# Patient Record
Sex: FEMALE | Race: BLACK OR AFRICAN AMERICAN | NOT HISPANIC OR LATINO | Employment: OTHER | URBAN - NONMETROPOLITAN AREA
[De-identification: names, ages, dates, MRNs, and addresses within clinical notes are randomized per-mention and may not be internally consistent; named-entity substitution may affect disease eponyms.]

---

## 2017-01-02 DIAGNOSIS — J30.2 SEASONAL ALLERGIES: ICD-10-CM

## 2017-01-02 RX ORDER — MONTELUKAST SODIUM 10 MG/1
TABLET ORAL
Qty: 90 TABLET | Refills: 3 | Status: SHIPPED | OUTPATIENT
Start: 2017-01-02 | End: 2017-10-13 | Stop reason: SDUPTHER

## 2017-01-12 ENCOUNTER — OFFICE VISIT (OUTPATIENT)
Dept: OTOLARYNGOLOGY | Facility: CLINIC | Age: 67
End: 2017-01-12

## 2017-01-12 VITALS
RESPIRATION RATE: 16 BRPM | TEMPERATURE: 97.7 F | WEIGHT: 182 LBS | HEART RATE: 108 BPM | DIASTOLIC BLOOD PRESSURE: 87 MMHG | SYSTOLIC BLOOD PRESSURE: 135 MMHG | HEIGHT: 63 IN | BODY MASS INDEX: 32.25 KG/M2

## 2017-01-12 DIAGNOSIS — J30.1 ALLERGIC RHINITIS DUE TO POLLEN, UNSPECIFIED RHINITIS SEASONALITY: ICD-10-CM

## 2017-01-12 DIAGNOSIS — G47.33 OSA (OBSTRUCTIVE SLEEP APNEA): ICD-10-CM

## 2017-01-12 DIAGNOSIS — J30.0 VASOMOTOR RHINITIS: ICD-10-CM

## 2017-01-12 DIAGNOSIS — J32.9 CHRONIC SINUSITIS, UNSPECIFIED LOCATION: Primary | ICD-10-CM

## 2017-01-12 DIAGNOSIS — J34.89 CONCHA BULLOSA: ICD-10-CM

## 2017-01-12 PROCEDURE — 99213 OFFICE O/P EST LOW 20 MIN: CPT | Performed by: NURSE PRACTITIONER

## 2017-01-12 RX ORDER — IPRATROPIUM BROMIDE 42 UG/1
2 SPRAY, METERED NASAL 2 TIMES DAILY PRN
Qty: 15 ML | Refills: 11 | Status: SHIPPED | OUTPATIENT
Start: 2017-01-12 | End: 2017-07-12 | Stop reason: SDUPTHER

## 2017-01-12 NOTE — PATIENT INSTRUCTIONS
Will add atrovent as needed or she can substitute this for the Patanase if it works better.  Call for problems or worsening symptoms

## 2017-01-12 NOTE — MR AVS SNAPSHOT
Camila Wallace   1/12/2017 1:00 PM   Office Visit    Dept Phone:  [phone]   Encounter #:  [id number]    Provider:  TONEY Rodriguez   Department:  [facility]                Your Full Care Plan              Today's Medication Changes          These changes are accurate as of: 1/12/17  1:29 PM.  If you have any questions, ask your nurse or doctor.               New Medication(s)Ordered:     ipratropium 0.06 % nasal spray   Commonly known as:  ATROVENT   2 sprays into each nostril 2 (Two) Times a Day As Needed for rhinitis.   Started by:  TONEY Rodriguez            Where to Get Your Medications      These medications were sent to [facility] - [place], [place] - [address] - [phone]  - [phone]   [address], [facility] [address]     Phone:  [phone]     ipratropium 0.06 % nasal spray                  Your Updated Medication List          This list is accurate as of: 1/12/17  1:29 PM.  Always use your most recent med list.                atorvastatin 40 MG tablet   Commonly known as:  LIPITOR       azelastine 0.1 % nasal spray   Commonly known as:  ASTELIN   2 sprays into each nostril 2 (Two) Times a Day for 90 days. Use in each nostril as directed       fluticasone 50 MCG/ACT nasal spray   Commonly known as:  FLONASE       glimepiride 1 MG tablet   Commonly known as:  AMARYL       ipratropium 0.06 % nasal spray   Commonly known as:  ATROVENT   2 sprays into each nostril 2 (Two) Times a Day As Needed for rhinitis.       levothyroxine sodium 100 MCG capsule   Commonly known as:  TIROSINT       montelukast 10 MG tablet   Commonly known as:  SINGULAIR       NORCO  MG per tablet   Generic drug:  HYDROcodone-acetaminophen       PENNSAID 2 % solution   Generic drug:  Diclofenac Sodium       PRINZIDE 20-12.5 MG per tablet   Generic drug:  lisinopril-hydrochlorothiazide       PROTONIX 40 MG EC tablet   Generic drug:  pantoprazole       "topiramate 100 MG tablet   Commonly known as:  TOPAMAX       vitamin D 81412 UNITS capsule capsule   Commonly known as:  ERGOCALCIFEROL               Instructions     None    Patient Instructions History      Upcoming Appointments     Visit Type Date Time Department    FOLLOW UP 2017  1:00 PM [facility] [place]    FOLLOW UP 2017  1:00 PM [facility] [place]      MyChart Signup     Our records indicate that you have declined [facility] sign[facility]. If you would like to sign up for [facility], please email [email or web address] or call [phone] to obtain an activation code.             Other Info from Your Visit           Your Appointments     2017  1:00 PM CDT   Follow Up with TONEY Rodriguez   [facility] (--)    [address] [place] [address] [address] [id number] [phone]           Arrive 15 minutes prior to appointment.              Allergies     Metformin And Related        Reason for Visit     Follow-up sinus problems; sinus drainage      Vital Signs     Blood Pressure Pulse Temperature Respirations Height Weight    135/87 108 97.7 °F (36.5 °C) 16 63\" (160 cm) 182 lb (82.6 kg)    Body Mass Index Smoking Status                32.24 kg/m2 Never Smoker            "

## 2017-01-12 NOTE — PROGRESS NOTES
YOB: 1950  Location: [facility]  Location Address: [address] [place] [address], [facility], [address] [place] [address]  Location Phone: [phone]    Chief Complaint   Patient presents with   • Follow-up     sinus problems; sinus drainage       History of Present Illness  Camila Wallace is a 66 y.o. female.  Camila Wallace is here for a followup evaluation of ENT complaints. The patient has had problems with nasal congestion, repeated sinusitis and allergy  The symptoms are localized to the bilateral nose. The patient has had reduction in symptoms however still has some runny nose when she goes outside and other cold weather changes. The symptoms have been present for the last several years . The symptoms are aggravated by  allergy and weather change .  She denies obstructive symptoms.  She is wearing her CPAP without difficulty.  She is not interested in surgery.     Past Medical History   Diagnosis Date   • Allergic rhinitis    • GERD (gastroesophageal reflux disease)    • HLD (hyperlipidemia)    • HTN (hypertension)    • Type 2 diabetes mellitus        Past Surgical History   Procedure Laterality Date   • Cholecystectomy     • Tympanic membrane repair     • Tubal abdominal ligation     • Foot surgery           Current Outpatient Prescriptions:   •  atorvastatin (LIPITOR) 40 MG tablet, Take 40 mg by mouth Daily., Disp: , Rfl:   •  azelastine (ASTELIN) 0.1 % nasal spray, 2 sprays into each nostril 2 (Two) Times a Day for 90 days. Use in each nostril as directed, Disp: 3 each, Rfl: 3  •  Diclofenac Sodium (PENNSAID) 2 % solution, Apply 2 Squirts topically 2 (Two) Times a Day., Disp: , Rfl:   •  fluticasone (FLONASE) 50 MCG/ACT nasal spray, 2 sprays into each nostril Daily., Disp: , Rfl:   •  glimepiride (AMARYL) 1 MG tablet, Take 1 mg by mouth Daily., Disp: , Rfl:   •  HYDROcodone-acetaminophen (NORCO)  MG per tablet, Take 1 tablet by mouth 3 (Three) Times a Day As Needed., Disp: , Rfl:   •  levothyroxine  sodium (TIROSINT) 100 MCG capsule, Take 100 mcg by mouth Daily., Disp: , Rfl:   •  lisinopril-hydrochlorothiazide (PRINZIDE) 20-12.5 MG per tablet, Take 1 tablet by mouth Daily., Disp: , Rfl:   •  montelukast (SINGULAIR) 10 MG tablet, Take 10 mg by mouth Daily., Disp: , Rfl:   •  pantoprazole (PROTONIX) 40 MG EC tablet, Take 40 mg by mouth Daily., Disp: , Rfl:   •  topiramate (TOPAMAX) 100 MG tablet, Take 100 mg by mouth 2 (Two) Times a Day., Disp: , Rfl:   •  vitamin D (ERGOCALCIFEROL) 31936 UNITS capsule capsule, Take 50,000 Units by mouth 1 (One) Time Per Week., Disp: , Rfl:   •  ipratropium (ATROVENT) 0.06 % nasal spray, 2 sprays into each nostril 2 (Two) Times a Day As Needed for rhinitis., Disp: 15 mL, Rfl: 11    Metformin and related    Family History   Problem Relation Age of Onset   • Diabetes Mother    • Heart disease Mother    • Hypertension Mother    • Hypertension Father    • Diabetes Brother    • Hypertension Brother    • Hyperlipidemia Brother    • Hyperlipidemia Maternal Uncle    • Thyroid disease Maternal Uncle    • Cancer Maternal Grandmother        Social History     Social History   • Marital status:      Spouse name: N/A   • Number of children: N/A   • Years of education: N/A     Occupational History   • Not on file.     Social History Main Topics   • Smoking status: Never Smoker   • Smokeless tobacco: Never Used   • Alcohol use Yes      Comment: occasionally   • Drug use: Defer   • Sexual activity: Not on file     Other Topics Concern   • Not on file     Social History Narrative       Review of Systems   Constitutional: Negative.    HENT:        SEE HPI   Eyes: Negative.    Respiratory: Negative.    Cardiovascular: Negative.    Gastrointestinal: Negative.    Endocrine: Negative.    Genitourinary: Negative.    Musculoskeletal: Negative.    Skin: Negative.    Allergic/Immunologic: Negative.    Neurological: Negative.    Hematological: Negative.    Psychiatric/Behavioral: Negative.         Vitals:    01/12/17 1305   BP: 135/87   Pulse: 108   Resp: 16   Temp: 97.7 °F (36.5 °C)       Objective     Physical Exam    CONSTITUTIONAL: well nourished, alert, oriented, in no acute distress     COMMUNICATION AND VOICE: able to communicate normally, normal voice quality    HEAD: normocephalic, no lesions, atraumatic, no tenderness, no masses     FACE: appearance normal, no lesions, no tenderness, no deformities, facial motion symmetric    SALIVARY GLANDS: parotid glands with no tenderness, no swelling, no masses, submandibular glands with normal size, nontender    EYES: ocular motility normal, eyelids normal, orbits normal, no proptosis, conjunctiva normal , pupils equal, round     EARS:  Hearing: response to conversational voice normal bilaterally   External Ears: auricles without lesions  Otoscopic: tympanic membrane appearance normal, no lesions, no perforation, normal mobility, no fluid    NOSE:  External Nose: structure normal, no tenderness on palpation, no nasal discharge, no lesions, no evidence of trauma, nostrils patent   Intranasal Exam: nasal mucosa normal, vestibule within normal limits, inferior turbinate hypertrophy, nasal septum midline     ORAL:  Lips: upper and lower lips without lesion   Teeth: dentition within normal limits for age   Gums: gingivae healthy   Oral Mucosa: oral mucosa normal, no mucosal lesions   Floor of Mouth: Warthin’s duct patent, mucosa normal  Tongue: lingual mucosa normal without lesions, normal tongue mobility   Palate: soft and hard palates with normal mucosa and structure  Oropharynx: oropharyngeal mucosa normal    NECK: neck appearance normal, no mass,  noted without erythema or tenderness    THYROID: no overt thyromegaly, no tenderness, nodules or mass present on palpation, position midline     LYMPH NODES: no lymphadenopathy    CHEST/RESPIRATORY: respiratory effort normal     CARDIOVASCULAR: extremities without cyanosis or edema      NEUROLOGIC/PSYCHIATRIC:  oriented to time, place and person, mood normal, affect appropriate, CN II-XII intact grossly    Assessment/Plan   Camila was seen today for follow-up.    Diagnoses and all orders for this visit:    Chronic sinusitis, unspecified location    Sommer bullosa    Allergic rhinitis due to pollen, unspecified rhinitis seasonality    FAUSTO (obstructive sleep apnea)    Vasomotor rhinitis    Other orders  -     ipratropium (ATROVENT) 0.06 % nasal spray; 2 sprays into each nostril 2 (Two) Times a Day As Needed for rhinitis.      * Surgery not found *  No orders of the defined types were placed in this encounter.    Return in about 8 months (around 9/12/2017).       Patient Instructions   Will add atrovent as needed or she can substitute this for the Patanase if it works better.  Call for problems or worsening symptoms

## 2017-01-19 RX ORDER — HYDROCODONE BITARTRATE AND ACETAMINOPHEN 10; 325 MG/1; MG/1
1 TABLET ORAL 3 TIMES DAILY PRN
Qty: 90 TABLET | Refills: 0 | Status: SHIPPED | OUTPATIENT
Start: 2017-01-20 | End: 2017-02-17 | Stop reason: SDUPTHER

## 2017-02-17 ENCOUNTER — HOSPITAL ENCOUNTER (OUTPATIENT)
Dept: PAIN MANAGEMENT | Age: 67
Discharge: HOME | End: 2017-02-17
Payer: MEDICARE

## 2017-02-17 VITALS
HEIGHT: 62 IN | SYSTOLIC BLOOD PRESSURE: 138 MMHG | WEIGHT: 188 LBS | RESPIRATION RATE: 18 BRPM | OXYGEN SATURATION: 100 % | TEMPERATURE: 95.5 F | BODY MASS INDEX: 34.6 KG/M2 | DIASTOLIC BLOOD PRESSURE: 89 MMHG | HEART RATE: 105 BPM

## 2017-02-17 DIAGNOSIS — M25.561 PAIN IN BOTH KNEES, UNSPECIFIED CHRONICITY: ICD-10-CM

## 2017-02-17 DIAGNOSIS — M25.562 PAIN IN BOTH KNEES, UNSPECIFIED CHRONICITY: ICD-10-CM

## 2017-02-17 DIAGNOSIS — M17.0 ARTHRITIS OF BOTH KNEES: ICD-10-CM

## 2017-02-17 PROCEDURE — 2500000003 HC RX 250 WO HCPCS

## 2017-02-17 PROCEDURE — 6360000002 HC RX W HCPCS

## 2017-02-17 PROCEDURE — 80307 DRUG TEST PRSMV CHEM ANLYZR: CPT

## 2017-02-17 PROCEDURE — 20611 DRAIN/INJ JOINT/BURSA W/US: CPT

## 2017-02-17 RX ORDER — HYDROCODONE BITARTRATE AND ACETAMINOPHEN 10; 325 MG/1; MG/1
1 TABLET ORAL 3 TIMES DAILY PRN
Qty: 90 TABLET | Refills: 0 | Status: SHIPPED | OUTPATIENT
Start: 2017-02-17 | End: 2017-03-10 | Stop reason: SDUPTHER

## 2017-02-17 ASSESSMENT — PAIN - FUNCTIONAL ASSESSMENT: PAIN_FUNCTIONAL_ASSESSMENT: 0-10

## 2017-02-17 ASSESSMENT — PAIN DESCRIPTION - DESCRIPTORS: DESCRIPTORS: ACHING;THROBBING

## 2017-02-17 ASSESSMENT — ACTIVITIES OF DAILY LIVING (ADL): EFFECT OF PAIN ON DAILY ACTIVITIES: DAILY ACTIVITY

## 2017-02-22 DIAGNOSIS — I10 ESSENTIAL HYPERTENSION: ICD-10-CM

## 2017-02-23 RX ORDER — LISINOPRIL AND HYDROCHLOROTHIAZIDE 20; 12.5 MG/1; MG/1
TABLET ORAL
Qty: 90 TABLET | Refills: 2 | Status: SHIPPED | OUTPATIENT
Start: 2017-02-23 | End: 2017-12-01 | Stop reason: SDUPTHER

## 2017-02-23 RX ORDER — LEVOTHYROXINE SODIUM 0.1 MG/1
TABLET ORAL
Qty: 90 TABLET | Refills: 2 | Status: SHIPPED | OUTPATIENT
Start: 2017-02-23 | End: 2017-12-01 | Stop reason: SDUPTHER

## 2017-02-23 RX ORDER — ATORVASTATIN CALCIUM 40 MG/1
TABLET, FILM COATED ORAL
Qty: 90 TABLET | Refills: 3 | Status: SHIPPED | OUTPATIENT
Start: 2017-02-23 | End: 2018-07-13 | Stop reason: SDUPTHER

## 2017-03-03 ENCOUNTER — HOSPITAL ENCOUNTER (OUTPATIENT)
Dept: PAIN MANAGEMENT | Age: 67
Discharge: HOME | End: 2017-03-03
Payer: MEDICARE

## 2017-03-03 VITALS
TEMPERATURE: 97.2 F | BODY MASS INDEX: 34.6 KG/M2 | RESPIRATION RATE: 20 BRPM | DIASTOLIC BLOOD PRESSURE: 80 MMHG | HEIGHT: 62 IN | SYSTOLIC BLOOD PRESSURE: 122 MMHG | WEIGHT: 188 LBS | HEART RATE: 95 BPM | OXYGEN SATURATION: 99 %

## 2017-03-03 DIAGNOSIS — M25.562 PAIN IN BOTH KNEES, UNSPECIFIED CHRONICITY: ICD-10-CM

## 2017-03-03 DIAGNOSIS — M17.0 ARTHRITIS OF BOTH KNEES: ICD-10-CM

## 2017-03-03 DIAGNOSIS — M25.561 PAIN IN BOTH KNEES, UNSPECIFIED CHRONICITY: ICD-10-CM

## 2017-03-03 PROCEDURE — 6360000002 HC RX W HCPCS

## 2017-03-03 PROCEDURE — 20611 DRAIN/INJ JOINT/BURSA W/US: CPT

## 2017-03-03 PROCEDURE — 2500000003 HC RX 250 WO HCPCS

## 2017-03-03 ASSESSMENT — PAIN - FUNCTIONAL ASSESSMENT: PAIN_FUNCTIONAL_ASSESSMENT: 0-10

## 2017-03-03 ASSESSMENT — ACTIVITIES OF DAILY LIVING (ADL): EFFECT OF PAIN ON DAILY ACTIVITIES: DAILY ACTIVITY

## 2017-03-03 ASSESSMENT — PAIN DESCRIPTION - DESCRIPTORS: DESCRIPTORS: ACHING;THROBBING

## 2017-03-08 LAB
AMPHETAMINES, URINE: NEGATIVE NG/ML
BARBITURATES, URINE: NEGATIVE NG/ML
BENZODIAZEPINES, URINE: NEGATIVE NG/ML
CANNABINOIDS, URINE: NEGATIVE NG/ML
COCAINE METABOLITE, URINE: NEGATIVE NG/ML
CODEINE, URINE: NEGATIVE
CREATININE, URINE: 65.3 MG/DL (ref 20–300)
ETHANOL U, QUAN: NEGATIVE %
FENTANYL URINE: NEGATIVE ML
HYDROCODONE, UR CONF: 336 NG/ML
HYDROCODONE, URINE: POSITIVE
HYDROMORPHONE, URINE: NEGATIVE
MEPERIDINE, UR: NEGATIVE NG/ML
METHADONE SCREEN, URINE: NEGATIVE NG/ML
MORPHINE URINE: NEGATIVE
OPIATES, URINE: ABNORMAL NG/ML
OPIATES, URINE: POSITIVE NG/ML
OXYCODONE/OXYMORPHONE, UR: NEGATIVE NG/ML
PH, URINE: 5.4 (ref 4.5–8.9)
PHENCYCLIDINE, URINE: NEGATIVE NG/ML
PROPOXYPHENE, URINE: NEGATIVE NG/ML

## 2017-03-10 ENCOUNTER — HOSPITAL ENCOUNTER (OUTPATIENT)
Dept: PAIN MANAGEMENT | Age: 67
Discharge: HOME | End: 2017-03-10
Payer: MEDICARE

## 2017-03-10 ENCOUNTER — HOSPITAL ENCOUNTER (OUTPATIENT)
Dept: PAIN MANAGEMENT | Age: 67
Discharge: HOME | End: 2017-03-10

## 2017-03-10 VITALS
HEART RATE: 90 BPM | BODY MASS INDEX: 32.25 KG/M2 | DIASTOLIC BLOOD PRESSURE: 77 MMHG | OXYGEN SATURATION: 99 % | WEIGHT: 182 LBS | TEMPERATURE: 97 F | SYSTOLIC BLOOD PRESSURE: 120 MMHG | RESPIRATION RATE: 20 BRPM | HEIGHT: 63 IN

## 2017-03-10 DIAGNOSIS — M25.562 PAIN IN BOTH KNEES, UNSPECIFIED CHRONICITY: ICD-10-CM

## 2017-03-10 DIAGNOSIS — M25.561 PAIN IN BOTH KNEES, UNSPECIFIED CHRONICITY: ICD-10-CM

## 2017-03-10 DIAGNOSIS — M17.0 ARTHRITIS OF BOTH KNEES: ICD-10-CM

## 2017-03-10 PROCEDURE — 20611 DRAIN/INJ JOINT/BURSA W/US: CPT

## 2017-03-10 PROCEDURE — 6360000002 HC RX W HCPCS

## 2017-03-10 PROCEDURE — 2500000003 HC RX 250 WO HCPCS

## 2017-03-10 RX ORDER — LIDOCAINE HYDROCHLORIDE 10 MG/ML
INJECTION, SOLUTION EPIDURAL; INFILTRATION; INTRACAUDAL; PERINEURAL
Status: COMPLETED | OUTPATIENT
Start: 2017-03-10 | End: 2017-03-10

## 2017-03-10 RX ADMIN — LIDOCAINE HYDROCHLORIDE 1 ML: 10 INJECTION, SOLUTION EPIDURAL; INFILTRATION; INTRACAUDAL; PERINEURAL at 10:24

## 2017-03-10 ASSESSMENT — PAIN DESCRIPTION - DESCRIPTORS: DESCRIPTORS: ACHING;THROBBING

## 2017-03-10 ASSESSMENT — PAIN - FUNCTIONAL ASSESSMENT: PAIN_FUNCTIONAL_ASSESSMENT: 0-10

## 2017-03-10 ASSESSMENT — ACTIVITIES OF DAILY LIVING (ADL): EFFECT OF PAIN ON DAILY ACTIVITIES: DAILY ACTIVITY

## 2017-03-20 ENCOUNTER — HOSPITAL ENCOUNTER (EMERGENCY)
Age: 67
Discharge: LEFT BEFORE DISCHARGE | End: 2017-03-20
Payer: MEDICARE

## 2017-03-20 VITALS
RESPIRATION RATE: 18 BRPM | TEMPERATURE: 97.6 F | OXYGEN SATURATION: 100 % | WEIGHT: 180 LBS | BODY MASS INDEX: 31.89 KG/M2 | HEIGHT: 63 IN | SYSTOLIC BLOOD PRESSURE: 137 MMHG | HEART RATE: 102 BPM | DIASTOLIC BLOOD PRESSURE: 93 MMHG

## 2017-03-20 PROCEDURE — 4500000002 HC ER NO CHARGE

## 2017-03-20 ASSESSMENT — PAIN DESCRIPTION - LOCATION: LOCATION: WRIST

## 2017-03-20 ASSESSMENT — PAIN SCALES - GENERAL: PAINLEVEL_OUTOF10: 5

## 2017-03-20 ASSESSMENT — PAIN DESCRIPTION - ORIENTATION: ORIENTATION: LEFT

## 2017-03-22 ENCOUNTER — APPOINTMENT (OUTPATIENT)
Dept: GENERAL RADIOLOGY | Age: 67
End: 2017-03-22
Payer: MEDICARE

## 2017-03-22 ENCOUNTER — HOSPITAL ENCOUNTER (EMERGENCY)
Age: 67
Discharge: HOME | End: 2017-03-22
Attending: EMERGENCY MEDICINE
Payer: MEDICARE

## 2017-03-22 ENCOUNTER — APPOINTMENT (OUTPATIENT)
Dept: CT IMAGING | Age: 67
End: 2017-03-22
Payer: MEDICARE

## 2017-03-22 VITALS
RESPIRATION RATE: 18 BRPM | DIASTOLIC BLOOD PRESSURE: 68 MMHG | TEMPERATURE: 98.4 F | OXYGEN SATURATION: 98 % | WEIGHT: 180 LBS | HEIGHT: 63 IN | SYSTOLIC BLOOD PRESSURE: 102 MMHG | HEART RATE: 84 BPM | BODY MASS INDEX: 31.89 KG/M2

## 2017-03-22 DIAGNOSIS — R09.1 PLEURISY: Primary | ICD-10-CM

## 2017-03-22 LAB
ALBUMIN SERPL-MCNC: 4.1 G/DL (ref 3.5–5.2)
ALP BLD-CCNC: 85 U/L (ref 35–104)
ALT SERPL-CCNC: 21 U/L (ref 5–33)
ANION GAP SERPL CALCULATED.3IONS-SCNC: 12 MMOL/L (ref 7–19)
AST SERPL-CCNC: 30 U/L (ref 5–32)
BASOPHILS ABSOLUTE: 0 K/UL (ref 0–0.2)
BASOPHILS RELATIVE PERCENT: 0.4 % (ref 0–1)
BILIRUB SERPL-MCNC: <0.2 MG/DL (ref 0.2–1.2)
BUN BLDV-MCNC: 25 MG/DL (ref 8–23)
CALCIUM SERPL-MCNC: 9.2 MG/DL (ref 8.8–10.2)
CHLORIDE BLD-SCNC: 101 MMOL/L (ref 98–111)
CO2: 25 MMOL/L (ref 22–29)
CREAT SERPL-MCNC: 1.3 MG/DL (ref 0.5–0.9)
D DIMER: 3.97 NG/ML DDU (ref 0–0.48)
EOSINOPHILS ABSOLUTE: 0.1 K/UL (ref 0–0.6)
EOSINOPHILS RELATIVE PERCENT: 0.7 % (ref 0–5)
GFR NON-AFRICAN AMERICAN: 41
GLOBULIN: 3.3 G/DL
GLUCOSE BLD-MCNC: 95 MG/DL (ref 74–109)
HCT VFR BLD CALC: 34.6 % (ref 37–47)
HEMOGLOBIN: 11.5 G/DL (ref 12–16)
LYMPHOCYTES ABSOLUTE: 2.3 K/UL (ref 1.1–4.5)
LYMPHOCYTES RELATIVE PERCENT: 26.7 % (ref 20–40)
MCH RBC QN AUTO: 28.6 PG (ref 27–31)
MCHC RBC AUTO-ENTMCNC: 33.2 G/DL (ref 33–37)
MCV RBC AUTO: 86.1 FL (ref 81–99)
MONOCYTES ABSOLUTE: 0.7 K/UL (ref 0–0.9)
MONOCYTES RELATIVE PERCENT: 8.4 % (ref 0–10)
NEUTROPHILS ABSOLUTE: 5.4 K/UL (ref 1.5–7.5)
NEUTROPHILS RELATIVE PERCENT: 63.8 % (ref 50–65)
PDW BLD-RTO: 14.6 % (ref 11.5–14.5)
PERFORMED ON: NORMAL
PLATELET # BLD: 320 K/UL (ref 130–400)
PMV BLD AUTO: 10.6 FL (ref 7.4–10.4)
POC TROPONIN I: 0.01 NG/ML (ref 0–0.08)
POTASSIUM SERPL-SCNC: 4.4 MMOL/L (ref 3.5–5)
RBC # BLD: 4.02 M/UL (ref 4.2–5.4)
SODIUM BLD-SCNC: 138 MMOL/L (ref 136–145)
TOTAL PROTEIN: 7.4 G/DL (ref 6.6–8.7)
WBC # BLD: 8.4 K/UL (ref 4.8–10.8)

## 2017-03-22 PROCEDURE — 36415 COLL VENOUS BLD VENIPUNCTURE: CPT

## 2017-03-22 PROCEDURE — 99285 EMERGENCY DEPT VISIT HI MDM: CPT

## 2017-03-22 PROCEDURE — 85379 FIBRIN DEGRADATION QUANT: CPT

## 2017-03-22 PROCEDURE — 6360000004 HC RX CONTRAST MEDICATION: Performed by: EMERGENCY MEDICINE

## 2017-03-22 PROCEDURE — 6370000000 HC RX 637 (ALT 250 FOR IP): Performed by: EMERGENCY MEDICINE

## 2017-03-22 PROCEDURE — 80053 COMPREHEN METABOLIC PANEL: CPT

## 2017-03-22 PROCEDURE — 71275 CT ANGIOGRAPHY CHEST: CPT

## 2017-03-22 PROCEDURE — 99283 EMERGENCY DEPT VISIT LOW MDM: CPT | Performed by: EMERGENCY MEDICINE

## 2017-03-22 PROCEDURE — 85025 COMPLETE CBC W/AUTO DIFF WBC: CPT

## 2017-03-22 PROCEDURE — 71010 XR CHEST PORTABLE: CPT

## 2017-03-22 PROCEDURE — 2580000003 HC RX 258: Performed by: EMERGENCY MEDICINE

## 2017-03-22 PROCEDURE — 93005 ELECTROCARDIOGRAM TRACING: CPT

## 2017-03-22 PROCEDURE — 84484 ASSAY OF TROPONIN QUANT: CPT

## 2017-03-22 RX ORDER — ASPIRIN 81 MG/1
324 TABLET, CHEWABLE ORAL ONCE
Status: COMPLETED | OUTPATIENT
Start: 2017-03-22 | End: 2017-03-22

## 2017-03-22 RX ORDER — HYDROCODONE BITARTRATE AND ACETAMINOPHEN 10; 325 MG/1; MG/1
1 TABLET ORAL 3 TIMES DAILY PRN
Qty: 90 TABLET | Refills: 0 | Status: SHIPPED | OUTPATIENT
Start: 2017-03-22 | End: 2017-03-22 | Stop reason: SDUPTHER

## 2017-03-22 RX ORDER — HYDROCODONE BITARTRATE AND ACETAMINOPHEN 10; 325 MG/1; MG/1
1 TABLET ORAL 3 TIMES DAILY PRN
Qty: 90 TABLET | Refills: 0 | Status: SHIPPED | OUTPATIENT
Start: 2017-03-22 | End: 2017-04-21 | Stop reason: SDUPTHER

## 2017-03-22 RX ORDER — ASPIRIN 81 MG/1
TABLET, CHEWABLE ORAL
Status: DISCONTINUED
Start: 2017-03-22 | End: 2017-03-22 | Stop reason: HOSPADM

## 2017-03-22 RX ORDER — 0.9 % SODIUM CHLORIDE 0.9 %
1000 INTRAVENOUS SOLUTION INTRAVENOUS ONCE
Status: COMPLETED | OUTPATIENT
Start: 2017-03-22 | End: 2017-03-22

## 2017-03-22 RX ADMIN — IOPAMIDOL 90 ML: 755 INJECTION, SOLUTION INTRAVENOUS at 19:18

## 2017-03-22 RX ADMIN — ASPIRIN 81 MG CHEWABLE TABLET 324 MG: 81 TABLET CHEWABLE at 18:57

## 2017-03-22 RX ADMIN — SODIUM CHLORIDE 1000 ML: 9 INJECTION, SOLUTION INTRAVENOUS at 19:20

## 2017-03-22 ASSESSMENT — ENCOUNTER SYMPTOMS
SORE THROAT: 0
SHORTNESS OF BREATH: 0
COUGH: 0
DIARRHEA: 1
WHEEZING: 0
RHINORRHEA: 1
NAUSEA: 0
CHEST TIGHTNESS: 0
SINUS PRESSURE: 0
EYES NEGATIVE: 1
VOMITING: 0

## 2017-03-24 LAB
EKG P AXIS: 41 DEGREES
EKG P-R INTERVAL: 142 MS
EKG Q-T INTERVAL: 348 MS
EKG QRS DURATION: 90 MS
EKG QTC CALCULATION (BAZETT): 383 MS
EKG T AXIS: 14 DEGREES

## 2017-03-29 ENCOUNTER — OFFICE VISIT (OUTPATIENT)
Dept: PRIMARY CARE CLINIC | Age: 67
End: 2017-03-29
Payer: MEDICARE

## 2017-03-29 VITALS
RESPIRATION RATE: 18 BRPM | BODY MASS INDEX: 32.25 KG/M2 | DIASTOLIC BLOOD PRESSURE: 68 MMHG | HEART RATE: 84 BPM | SYSTOLIC BLOOD PRESSURE: 118 MMHG | OXYGEN SATURATION: 97 % | HEIGHT: 63 IN | WEIGHT: 182 LBS

## 2017-03-29 DIAGNOSIS — I10 ESSENTIAL HYPERTENSION: Primary | ICD-10-CM

## 2017-03-29 DIAGNOSIS — E89.0 POSTABLATIVE HYPOTHYROIDISM: ICD-10-CM

## 2017-03-29 DIAGNOSIS — E11.69 TYPE 2 DIABETES MELLITUS WITH OTHER SPECIFIED COMPLICATION, WITHOUT LONG-TERM CURRENT USE OF INSULIN (HCC): ICD-10-CM

## 2017-03-29 PROCEDURE — 99214 OFFICE O/P EST MOD 30 MIN: CPT | Performed by: INTERNAL MEDICINE

## 2017-03-29 PROCEDURE — 3014F SCREEN MAMMO DOC REV: CPT | Performed by: INTERNAL MEDICINE

## 2017-03-29 PROCEDURE — G8482 FLU IMMUNIZE ORDER/ADMIN: HCPCS | Performed by: INTERNAL MEDICINE

## 2017-03-29 PROCEDURE — 1123F ACP DISCUSS/DSCN MKR DOCD: CPT | Performed by: INTERNAL MEDICINE

## 2017-03-29 PROCEDURE — G8427 DOCREV CUR MEDS BY ELIG CLIN: HCPCS | Performed by: INTERNAL MEDICINE

## 2017-03-29 PROCEDURE — 4040F PNEUMOC VAC/ADMIN/RCVD: CPT | Performed by: INTERNAL MEDICINE

## 2017-03-29 PROCEDURE — G8417 CALC BMI ABV UP PARAM F/U: HCPCS | Performed by: INTERNAL MEDICINE

## 2017-03-29 PROCEDURE — 3017F COLORECTAL CA SCREEN DOC REV: CPT | Performed by: INTERNAL MEDICINE

## 2017-03-29 PROCEDURE — 1036F TOBACCO NON-USER: CPT | Performed by: INTERNAL MEDICINE

## 2017-03-29 PROCEDURE — G8399 PT W/DXA RESULTS DOCUMENT: HCPCS | Performed by: INTERNAL MEDICINE

## 2017-03-29 PROCEDURE — 3044F HG A1C LEVEL LT 7.0%: CPT | Performed by: INTERNAL MEDICINE

## 2017-03-29 PROCEDURE — 1090F PRES/ABSN URINE INCON ASSESS: CPT | Performed by: INTERNAL MEDICINE

## 2017-03-29 ASSESSMENT — ENCOUNTER SYMPTOMS
SHORTNESS OF BREATH: 0
NAUSEA: 0
CONSTIPATION: 0
COUGH: 0
VOMITING: 0
BACK PAIN: 0
DIARRHEA: 0

## 2017-04-04 ENCOUNTER — TELEPHONE (OUTPATIENT)
Dept: PRIMARY CARE CLINIC | Age: 67
End: 2017-04-04

## 2017-04-04 DIAGNOSIS — E11.9 TYPE 2 DIABETES MELLITUS WITHOUT COMPLICATION, WITHOUT LONG-TERM CURRENT USE OF INSULIN (HCC): ICD-10-CM

## 2017-04-04 RX ORDER — GLUCOSAMINE HCL/CHONDROITIN SU 500-400 MG
CAPSULE ORAL
Qty: 100 STRIP | Refills: 0 | Status: SHIPPED | OUTPATIENT
Start: 2017-04-04 | End: 2017-04-04 | Stop reason: SDUPTHER

## 2017-04-04 RX ORDER — GLUCOSAMINE HCL/CHONDROITIN SU 500-400 MG
CAPSULE ORAL
Qty: 100 STRIP | Refills: 0 | Status: SHIPPED | OUTPATIENT
Start: 2017-04-04 | End: 2017-08-10 | Stop reason: SDUPTHER

## 2017-04-21 ENCOUNTER — HOSPITAL ENCOUNTER (OUTPATIENT)
Dept: PAIN MANAGEMENT | Age: 67
Discharge: HOME | End: 2017-04-21
Payer: MEDICARE

## 2017-04-21 VITALS
DIASTOLIC BLOOD PRESSURE: 91 MMHG | OXYGEN SATURATION: 100 % | HEIGHT: 63 IN | RESPIRATION RATE: 20 BRPM | TEMPERATURE: 96.3 F | BODY MASS INDEX: 31.89 KG/M2 | HEART RATE: 95 BPM | SYSTOLIC BLOOD PRESSURE: 130 MMHG | WEIGHT: 180 LBS

## 2017-04-21 DIAGNOSIS — M25.562 PAIN IN BOTH KNEES, UNSPECIFIED CHRONICITY: ICD-10-CM

## 2017-04-21 DIAGNOSIS — M17.0 ARTHRITIS OF BOTH KNEES: ICD-10-CM

## 2017-04-21 DIAGNOSIS — M25.561 PAIN IN BOTH KNEES, UNSPECIFIED CHRONICITY: ICD-10-CM

## 2017-04-21 PROCEDURE — 99213 OFFICE O/P EST LOW 20 MIN: CPT

## 2017-04-21 RX ORDER — HYDROCODONE BITARTRATE AND ACETAMINOPHEN 10; 325 MG/1; MG/1
1 TABLET ORAL 3 TIMES DAILY PRN
Qty: 90 TABLET | Refills: 0 | Status: SHIPPED | OUTPATIENT
Start: 2017-04-21 | End: 2017-05-17 | Stop reason: SDUPTHER

## 2017-04-21 ASSESSMENT — PAIN SCALES - GENERAL: PAINLEVEL_OUTOF10: 7

## 2017-04-21 ASSESSMENT — PAIN DESCRIPTION - PAIN TYPE: TYPE: CHRONIC PAIN

## 2017-04-21 ASSESSMENT — PAIN DESCRIPTION - ORIENTATION: ORIENTATION: RIGHT;LEFT;LOWER

## 2017-04-21 ASSESSMENT — ACTIVITIES OF DAILY LIVING (ADL): EFFECT OF PAIN ON DAILY ACTIVITIES: DAILY ACTIVITY

## 2017-04-21 ASSESSMENT — PAIN DESCRIPTION - FREQUENCY: FREQUENCY: INTERMITTENT

## 2017-04-21 ASSESSMENT — PAIN DESCRIPTION - DESCRIPTORS: DESCRIPTORS: ACHING;THROBBING

## 2017-04-21 ASSESSMENT — PAIN DESCRIPTION - PROGRESSION: CLINICAL_PROGRESSION: NOT CHANGED

## 2017-04-21 ASSESSMENT — PAIN DESCRIPTION - LOCATION: LOCATION: BACK;KNEE

## 2017-04-21 ASSESSMENT — PAIN DESCRIPTION - ONSET: ONSET: ON-GOING

## 2017-04-21 ASSESSMENT — PAIN DESCRIPTION - DIRECTION: RADIATING_TOWARDS: DOES NOT RADIATE

## 2017-05-18 RX ORDER — HYDROCODONE BITARTRATE AND ACETAMINOPHEN 10; 325 MG/1; MG/1
1 TABLET ORAL 3 TIMES DAILY PRN
Qty: 90 TABLET | Refills: 0 | Status: SHIPPED | OUTPATIENT
Start: 2017-05-21 | End: 2017-06-16 | Stop reason: SDUPTHER

## 2017-06-19 RX ORDER — HYDROCODONE BITARTRATE AND ACETAMINOPHEN 10; 325 MG/1; MG/1
1 TABLET ORAL 3 TIMES DAILY PRN
Qty: 90 TABLET | Refills: 0 | Status: SHIPPED | OUTPATIENT
Start: 2017-06-20 | End: 2017-07-17 | Stop reason: SDUPTHER

## 2017-07-12 ENCOUNTER — OFFICE VISIT (OUTPATIENT)
Dept: OTOLARYNGOLOGY | Facility: CLINIC | Age: 67
End: 2017-07-12

## 2017-07-12 VITALS
HEIGHT: 63 IN | TEMPERATURE: 97.8 F | WEIGHT: 184.13 LBS | BODY MASS INDEX: 32.62 KG/M2 | HEART RATE: 86 BPM | SYSTOLIC BLOOD PRESSURE: 128 MMHG | DIASTOLIC BLOOD PRESSURE: 87 MMHG

## 2017-07-12 DIAGNOSIS — G47.33 OSA (OBSTRUCTIVE SLEEP APNEA): ICD-10-CM

## 2017-07-12 DIAGNOSIS — J30.1 ALLERGIC RHINITIS DUE TO POLLEN, UNSPECIFIED RHINITIS SEASONALITY: ICD-10-CM

## 2017-07-12 DIAGNOSIS — J34.3 HYPERTROPHY OF INFERIOR NASAL TURBINATE: ICD-10-CM

## 2017-07-12 DIAGNOSIS — J30.0 VASOMOTOR RHINITIS: ICD-10-CM

## 2017-07-12 DIAGNOSIS — R09.82 PND (POST-NASAL DRIP): ICD-10-CM

## 2017-07-12 DIAGNOSIS — J34.89 CONCHA BULLOSA: ICD-10-CM

## 2017-07-12 DIAGNOSIS — J30.9 CHRONIC ALLERGIC RHINITIS: ICD-10-CM

## 2017-07-12 DIAGNOSIS — J32.9 CHRONIC SINUSITIS, UNSPECIFIED LOCATION: Primary | ICD-10-CM

## 2017-07-12 PROCEDURE — 99213 OFFICE O/P EST LOW 20 MIN: CPT | Performed by: NURSE PRACTITIONER

## 2017-07-12 RX ORDER — IPRATROPIUM BROMIDE 42 UG/1
2 SPRAY, METERED NASAL 2 TIMES DAILY PRN
Qty: 15 ML | Refills: 11 | Status: SHIPPED | OUTPATIENT
Start: 2017-07-12 | End: 2018-07-02 | Stop reason: SDUPTHER

## 2017-07-12 NOTE — PROGRESS NOTES
YOB: 1950  Location: [facility]  Location Address: [address] [place] [address], [facility] [address], [address] [place] [address]  Location Phone: [phone]    Chief Complaint   Patient presents with   • Sinus Problem       History of Present Illness  Camila Wallace is a 67 y.o. female.  Camila Wallace is here for a followup evaluation of ENT complaints. The patient has had problems with nasal congestion, repeated sinusitis and allergy  The symptoms are localized to the bilateral nose.  The symptoms have been present for the last several years . The symptoms are aggravated by  allergy and weather change .  She denies obstructive symptoms.  She is wearing her CPAP without difficulty.  She denies any other complaints.     Past Medical History:   Diagnosis Date   • Allergic rhinitis    • Chronic sinusitis    • Sommer bullosa    • GERD (gastroesophageal reflux disease)    • HLD (hyperlipidemia)    • HTN (hypertension)    • Obstructive sleep apnea    • Type 2 diabetes mellitus    • Vasomotor rhinitis        Past Surgical History:   Procedure Laterality Date   • CHOLECYSTECTOMY     • FOOT SURGERY     • TUBAL ABDOMINAL LIGATION     • TYMPANIC MEMBRANE REPAIR           Current Outpatient Prescriptions:   •  atorvastatin (LIPITOR) 40 MG tablet, Take 40 mg by mouth Daily., Disp: , Rfl:   •  Diclofenac Sodium (PENNSAID) 2 % solution, Apply 2 Squirts topically 2 (Two) Times a Day., Disp: , Rfl:   •  fluticasone (FLONASE) 50 MCG/ACT nasal spray, 2 sprays into each nostril Daily., Disp: , Rfl:   •  glimepiride (AMARYL) 1 MG tablet, Take 1 mg by mouth Daily., Disp: , Rfl:   •  HYDROcodone-acetaminophen (NORCO)  MG per tablet, Take 1 tablet by mouth 3 (Three) Times a Day As Needed., Disp: , Rfl:   •  ipratropium (ATROVENT) 0.06 % nasal spray, 2 sprays into each nostril 2 (Two) Times a Day As Needed for Rhinitis., Disp: 15 mL, Rfl: 11  •  levothyroxine sodium (TIROSINT) 100 MCG capsule, Take 100 mcg by mouth Daily., Disp: , Rfl:   •   lisinopril-hydrochlorothiazide (PRINZIDE) 20-12.5 MG per tablet, Take 1 tablet by mouth Daily., Disp: , Rfl:   •  pantoprazole (PROTONIX) 40 MG EC tablet, Take 40 mg by mouth Daily., Disp: , Rfl:   •  topiramate (TOPAMAX) 100 MG tablet, Take 100 mg by mouth 2 (Two) Times a Day., Disp: , Rfl:   •  vitamin D (ERGOCALCIFEROL) 75388 UNITS capsule capsule, Take 50,000 Units by mouth 1 (One) Time Per Week., Disp: , Rfl:     Metformin and related    Family History   Problem Relation Age of Onset   • Diabetes Mother    • Heart disease Mother    • Hypertension Mother    • Hypertension Father    • Diabetes Brother    • Hypertension Brother    • Hyperlipidemia Brother    • Hyperlipidemia Maternal Uncle    • Thyroid disease Maternal Uncle    • Cancer Maternal Grandmother        Social History     Social History   • Marital status:      Spouse name: N/A   • Number of children: N/A   • Years of education: N/A     Occupational History   • Not on file.     Social History Main Topics   • Smoking status: Never Smoker   • Smokeless tobacco: Never Used   • Alcohol use Yes      Comment: occasionally   • Drug use: Defer   • Sexual activity: Not on file     Other Topics Concern   • Not on file     Social History Narrative       Review of Systems   Constitutional: Negative.    HENT:        SEE HPI   Eyes: Negative.    Respiratory: Negative.    Cardiovascular: Negative.    Gastrointestinal: Negative.    Endocrine: Negative.    Genitourinary: Negative.    Musculoskeletal: Negative.    Skin: Negative.    Allergic/Immunologic: Negative.    Neurological: Negative.    Hematological: Negative.    Psychiatric/Behavioral: Negative.        Vitals:    07/12/17 1254   BP: 128/87   Pulse: 86   Temp: 97.8 °F (36.6 °C)       Objective     Physical Exam    CONSTITUTIONAL: well nourished, alert, oriented, in no acute distress     COMMUNICATION AND VOICE: able to communicate normally, normal voice quality    HEAD: normocephalic, no lesions,  atraumatic, no tenderness, no masses     FACE: appearance normal, no lesions, no tenderness, no deformities, facial motion symmetric    SALIVARY GLANDS: parotid glands with no tenderness, no swelling, no masses, submandibular glands with normal size, nontender    EYES: ocular motility normal, eyelids normal, orbits normal, no proptosis, conjunctiva normal , pupils equal, round     EARS:  Hearing: response to conversational voice normal bilaterally   External Ears: auricles without lesions  Otoscopic: tympanic membrane appearance normal, no lesions, no perforation, normal mobility, no fluid    NOSE:  External Nose: structure normal, no tenderness on palpation, no nasal discharge, no lesions, no evidence of trauma, nostrils patent   Intranasal Exam: nasal mucosa normal, vestibule within normal limits, inferior turbinate hypertrophy, nasal septum midline     ORAL:  Lips: upper and lower lips without lesion   Teeth: dentition within normal limits for age   Gums: gingivae healthy   Oral Mucosa: oral mucosa normal, no mucosal lesions   Floor of Mouth: Warthin’s duct patent, mucosa normal  Tongue: lingual mucosa normal without lesions, normal tongue mobility   Palate: soft and hard palates with normal mucosa and structure  Oropharynx: oropharyngeal mucosa normal    NECK: neck appearance normal, no mass,  noted without erythema or tenderness    THYROID: no overt thyromegaly, no tenderness, nodules or mass present on palpation, position midline     LYMPH NODES: no lymphadenopathy    CHEST/RESPIRATORY: respiratory effort normal     CARDIOVASCULAR: extremities without cyanosis or edema      NEUROLOGIC/PSYCHIATRIC: oriented to time, place and person, mood normal, affect appropriate, CN II-XII intact grossly    Assessment/Plan   Camila was seen today for sinus problem.    Diagnoses and all orders for this visit:    Chronic sinusitis, unspecified location    Sommer bullosa    Allergic rhinitis due to pollen, unspecified rhinitis  seasonality    Chronic allergic rhinitis    Vasomotor rhinitis    Hypertrophy of inferior nasal turbinate    PND (post-nasal drip)    FAUSTO (obstructive sleep apnea)    Other orders  -     ipratropium (ATROVENT) 0.06 % nasal spray; 2 sprays into each nostril 2 (Two) Times a Day As Needed for Rhinitis.    * Surgery not found *  No orders of the defined types were placed in this encounter.    Return in about 1 year (around 7/12/2018).       Patient Instructions   Call for problems or worsening symptoms    Avoidance of allergies discussed.  Use masks when performing yardwork or cleaning activities if indicated.  Continue allergy medications as discussed.    Air purifier as needed.  If on nasal sprays, recommend to use daily as indicated.   Medical vs surgical options discussed.

## 2017-07-17 RX ORDER — HYDROCODONE BITARTRATE AND ACETAMINOPHEN 10; 325 MG/1; MG/1
1 TABLET ORAL 3 TIMES DAILY PRN
Qty: 90 TABLET | Refills: 0 | Status: SHIPPED | OUTPATIENT
Start: 2017-07-17 | End: 2017-07-20 | Stop reason: CLARIF

## 2017-07-20 ENCOUNTER — TELEPHONE (OUTPATIENT)
Dept: PRIMARY CARE CLINIC | Age: 67
End: 2017-07-20

## 2017-08-10 DIAGNOSIS — E11.9 TYPE 2 DIABETES MELLITUS WITHOUT COMPLICATION, WITHOUT LONG-TERM CURRENT USE OF INSULIN (HCC): ICD-10-CM

## 2017-08-10 RX ORDER — GLUCOSAMINE HCL/CHONDROITIN SU 500-400 MG
CAPSULE ORAL
Qty: 100 STRIP | Refills: 1 | Status: SHIPPED | OUTPATIENT
Start: 2017-08-10 | End: 2017-08-10 | Stop reason: SDUPTHER

## 2017-08-10 RX ORDER — GLUCOSAMINE HCL/CHONDROITIN SU 500-400 MG
CAPSULE ORAL
Qty: 100 STRIP | Refills: 1 | Status: SHIPPED | OUTPATIENT
Start: 2017-08-10 | End: 2018-09-24 | Stop reason: SDUPTHER

## 2017-08-17 RX ORDER — HYDROCODONE BITARTRATE AND ACETAMINOPHEN 10; 325 MG/1; MG/1
1 TABLET ORAL EVERY 8 HOURS PRN
Qty: 90 TABLET | Refills: 0 | Status: SHIPPED | OUTPATIENT
Start: 2017-08-19 | End: 2017-09-14 | Stop reason: SDUPTHER

## 2017-09-14 ENCOUNTER — HOSPITAL ENCOUNTER (OUTPATIENT)
Dept: PAIN MANAGEMENT | Age: 67
Discharge: HOME | End: 2017-09-14
Payer: MEDICARE

## 2017-09-14 VITALS
TEMPERATURE: 97.2 F | BODY MASS INDEX: 32.6 KG/M2 | DIASTOLIC BLOOD PRESSURE: 81 MMHG | SYSTOLIC BLOOD PRESSURE: 125 MMHG | WEIGHT: 184 LBS | HEART RATE: 83 BPM | RESPIRATION RATE: 18 BRPM | OXYGEN SATURATION: 100 % | HEIGHT: 63 IN

## 2017-09-14 DIAGNOSIS — M25.562 PAIN IN BOTH KNEES, UNSPECIFIED CHRONICITY: ICD-10-CM

## 2017-09-14 DIAGNOSIS — M17.0 ARTHRITIS OF BOTH KNEES: ICD-10-CM

## 2017-09-14 DIAGNOSIS — M25.561 PAIN IN BOTH KNEES, UNSPECIFIED CHRONICITY: ICD-10-CM

## 2017-09-14 PROCEDURE — 99213 OFFICE O/P EST LOW 20 MIN: CPT

## 2017-09-14 RX ORDER — HYDROCODONE BITARTRATE AND ACETAMINOPHEN 10; 325 MG/1; MG/1
1 TABLET ORAL EVERY 8 HOURS PRN
Qty: 90 TABLET | Refills: 0 | Status: SHIPPED | OUTPATIENT
Start: 2017-09-18 | End: 2017-10-16 | Stop reason: SDUPTHER

## 2017-09-14 ASSESSMENT — PAIN SCALES - GENERAL: PAINLEVEL_OUTOF10: 8

## 2017-09-14 ASSESSMENT — PAIN DESCRIPTION - FREQUENCY: FREQUENCY: INTERMITTENT

## 2017-09-14 ASSESSMENT — ACTIVITIES OF DAILY LIVING (ADL): EFFECT OF PAIN ON DAILY ACTIVITIES: LIMITS ACTIVITY

## 2017-09-14 ASSESSMENT — PAIN DESCRIPTION - DIRECTION: RADIATING_TOWARDS: DOES NOT RADIATE

## 2017-09-14 ASSESSMENT — PAIN DESCRIPTION - PAIN TYPE: TYPE: CHRONIC PAIN

## 2017-09-14 ASSESSMENT — PAIN DESCRIPTION - LOCATION: LOCATION: KNEE

## 2017-09-14 ASSESSMENT — PAIN DESCRIPTION - DESCRIPTORS: DESCRIPTORS: ACHING;THROBBING

## 2017-09-14 ASSESSMENT — PAIN DESCRIPTION - ORIENTATION: ORIENTATION: RIGHT;LEFT

## 2017-09-14 ASSESSMENT — PAIN DESCRIPTION - ONSET: ONSET: ON-GOING

## 2017-09-14 ASSESSMENT — PAIN DESCRIPTION - PROGRESSION: CLINICAL_PROGRESSION: NOT CHANGED

## 2017-09-25 ENCOUNTER — APPOINTMENT (OUTPATIENT)
Dept: GENERAL RADIOLOGY | Age: 67
End: 2017-09-25
Payer: MEDICARE

## 2017-09-25 ENCOUNTER — HOSPITAL ENCOUNTER (EMERGENCY)
Age: 67
Discharge: HOME | End: 2017-09-25
Payer: MEDICARE

## 2017-09-25 VITALS
TEMPERATURE: 97.5 F | BODY MASS INDEX: 32.6 KG/M2 | HEART RATE: 69 BPM | OXYGEN SATURATION: 100 % | DIASTOLIC BLOOD PRESSURE: 87 MMHG | SYSTOLIC BLOOD PRESSURE: 113 MMHG | WEIGHT: 184 LBS | RESPIRATION RATE: 18 BRPM | HEIGHT: 63 IN

## 2017-09-25 DIAGNOSIS — M19.039 ARTHRITIS OF WRIST: Primary | ICD-10-CM

## 2017-09-25 PROCEDURE — 96372 THER/PROPH/DIAG INJ SC/IM: CPT

## 2017-09-25 PROCEDURE — 73110 X-RAY EXAM OF WRIST: CPT

## 2017-09-25 PROCEDURE — 99282 EMERGENCY DEPT VISIT SF MDM: CPT | Performed by: NURSE PRACTITIONER

## 2017-09-25 PROCEDURE — 6360000002 HC RX W HCPCS: Performed by: NURSE PRACTITIONER

## 2017-09-25 PROCEDURE — 99283 EMERGENCY DEPT VISIT LOW MDM: CPT

## 2017-09-25 RX ORDER — DEXAMETHASONE SODIUM PHOSPHATE 10 MG/ML
10 INJECTION INTRAMUSCULAR; INTRAVENOUS ONCE
Status: COMPLETED | OUTPATIENT
Start: 2017-09-25 | End: 2017-09-25

## 2017-09-25 RX ADMIN — DEXAMETHASONE SODIUM PHOSPHATE 10 MG: 10 INJECTION INTRAMUSCULAR; INTRAVENOUS at 14:29

## 2017-09-27 DIAGNOSIS — M81.0 OSTEOPOROSIS, UNSPECIFIED OSTEOPOROSIS TYPE, UNSPECIFIED PATHOLOGICAL FRACTURE PRESENCE: ICD-10-CM

## 2017-09-27 RX ORDER — RISEDRONATE SODIUM 150 MG/1
1 TABLET, FILM COATED ORAL
Qty: 3 TABLET | Refills: 3 | Status: SHIPPED | OUTPATIENT
Start: 2017-09-27 | End: 2018-06-14

## 2017-10-02 ENCOUNTER — OFFICE VISIT (OUTPATIENT)
Dept: PRIMARY CARE CLINIC | Age: 67
End: 2017-10-02
Payer: MEDICARE

## 2017-10-02 VITALS
TEMPERATURE: 97.4 F | SYSTOLIC BLOOD PRESSURE: 124 MMHG | RESPIRATION RATE: 18 BRPM | BODY MASS INDEX: 32.43 KG/M2 | OXYGEN SATURATION: 98 % | HEART RATE: 98 BPM | DIASTOLIC BLOOD PRESSURE: 74 MMHG | HEIGHT: 63 IN | WEIGHT: 183 LBS

## 2017-10-02 DIAGNOSIS — I10 ESSENTIAL HYPERTENSION: ICD-10-CM

## 2017-10-02 DIAGNOSIS — E11.9 TYPE 2 DIABETES MELLITUS WITHOUT COMPLICATION, WITHOUT LONG-TERM CURRENT USE OF INSULIN (HCC): Primary | ICD-10-CM

## 2017-10-02 DIAGNOSIS — E89.0 POSTABLATIVE HYPOTHYROIDISM: ICD-10-CM

## 2017-10-02 PROCEDURE — G8399 PT W/DXA RESULTS DOCUMENT: HCPCS | Performed by: INTERNAL MEDICINE

## 2017-10-02 PROCEDURE — G8417 CALC BMI ABV UP PARAM F/U: HCPCS | Performed by: INTERNAL MEDICINE

## 2017-10-02 PROCEDURE — G8484 FLU IMMUNIZE NO ADMIN: HCPCS | Performed by: INTERNAL MEDICINE

## 2017-10-02 PROCEDURE — 90688 IIV4 VACCINE SPLT 0.5 ML IM: CPT | Performed by: INTERNAL MEDICINE

## 2017-10-02 PROCEDURE — 99214 OFFICE O/P EST MOD 30 MIN: CPT | Performed by: INTERNAL MEDICINE

## 2017-10-02 PROCEDURE — 1090F PRES/ABSN URINE INCON ASSESS: CPT | Performed by: INTERNAL MEDICINE

## 2017-10-02 PROCEDURE — G8427 DOCREV CUR MEDS BY ELIG CLIN: HCPCS | Performed by: INTERNAL MEDICINE

## 2017-10-02 PROCEDURE — 3014F SCREEN MAMMO DOC REV: CPT | Performed by: INTERNAL MEDICINE

## 2017-10-02 PROCEDURE — 3017F COLORECTAL CA SCREEN DOC REV: CPT | Performed by: INTERNAL MEDICINE

## 2017-10-02 PROCEDURE — 1036F TOBACCO NON-USER: CPT | Performed by: INTERNAL MEDICINE

## 2017-10-02 PROCEDURE — 1123F ACP DISCUSS/DSCN MKR DOCD: CPT | Performed by: INTERNAL MEDICINE

## 2017-10-02 PROCEDURE — 3046F HEMOGLOBIN A1C LEVEL >9.0%: CPT | Performed by: INTERNAL MEDICINE

## 2017-10-02 PROCEDURE — 4040F PNEUMOC VAC/ADMIN/RCVD: CPT | Performed by: INTERNAL MEDICINE

## 2017-10-02 PROCEDURE — G0008 ADMIN INFLUENZA VIRUS VAC: HCPCS | Performed by: INTERNAL MEDICINE

## 2017-10-02 ASSESSMENT — ENCOUNTER SYMPTOMS
NAUSEA: 0
SHORTNESS OF BREATH: 0
COUGH: 0
VOMITING: 0
DIARRHEA: 0
CONSTIPATION: 0
BACK PAIN: 0

## 2017-10-02 NOTE — PROGRESS NOTES
[facility]  [address] [facility]  [address] [facility] [address]  Dept: [phone]  Dept Fax: [phone]  Loc: [phone]    Mata Cm is a 79 y.o. female who presents today for her medical conditions/complaints as noted below. Mata Cm is c/o of   Chief Complaint   Patient presents with    Medicare AWV         HPI:     HPI  Ms. Geovanny Blake returns for follow-up of hypertension, asthma, hypothyroidism. Clinically she is doing well. Her breathing pattern is stable. Her appetite is stable. Her metabolic parameters are under good control.     %HBA1C (%)   Date Value   07/27/2015 5.7   04/30/2015 5.8   07/01/2014 5.7     Hemoglobin A1C (%)   Date Value   12/21/2016 5.6   06/06/2016 5.6   12/21/2015 5.6             ( goal A1C is < 7)   MICROALBUMIN, RANDOM URINE (mg/dL)   Date Value   06/06/2016 <1.20     LDL Calculated (mg/dL)   Date Value   12/21/2016 72   06/06/2016 63       (goal LDL is <100)   AST (U/L)   Date Value   03/22/2017 30     ALT (U/L)   Date Value   03/22/2017 21     BUN (mg/dL)   Date Value   03/22/2017 25 (H)     BP Readings from Last 3 Encounters:   10/02/17 124/74   09/25/17 113/87   09/14/17 125/81          (goal 120/80)    Past Medical History:   Diagnosis Date    Arthritis     Cataract of both eyes     Chronic back pain     Hyperlipidemia     Hypertension     Hypothyroidism     Lupus (systemic lupus erythematosus) (HCC)     Sickle cell anemia (HCC)     carrier    Type II or unspecified type diabetes mellitus without mention of complication, not stated as uncontrolled       Past Surgical History:   Procedure Laterality Date    BACK SURGERY      lumbar    CHOLECYSTECTOMY      COLONOSCOPY      FOOT SURGERY Left 1/2014    bunion and hammer toe    TUBAL LIGATION      TYMPANOPLASTY         Family History   Problem Relation Age of Onset    High Cholesterol Sister     High Cholesterol Brother     Sickle Cell Anemia Daughter    [employer] 12/18/2017 (Originally 2/15/1969)    Diabetic hemoglobin A1C test  12/21/2017    Lipid screen  12/21/2017    Breast cancer screen  12/21/2017    Diabetic foot exam  10/02/2018    Colon cancer screen colonoscopy  01/01/2024    Zostavax vaccine  Addressed    DEXA (modify frequency per FRAX score)  Completed    Flu vaccine  Completed    Pneumococcal low/med risk  Completed       Subjective:      Review of Systems   Constitutional: Negative for activity change and fatigue. Respiratory: Negative for cough and shortness of breath. Cardiovascular: Negative for chest pain and leg swelling. Gastrointestinal: Negative for constipation, diarrhea, nausea and vomiting. Genitourinary: Negative for difficulty urinating and dysuria. Musculoskeletal: Negative for arthralgias and back pain. Neurological: Negative for dizziness and headaches. Objective:     Physical Exam   Constitutional: She is oriented to person, place, and time. She appears well-developed and well-nourished. HENT:   Head: Normocephalic and atraumatic. Mouth/Throat: Oropharynx is clear and moist.   Eyes: Conjunctivae are normal. Pupils are equal, round, and reactive to light. Cardiovascular: Normal rate, regular rhythm and normal heart sounds. Pulmonary/Chest: Effort normal and breath sounds normal.   Abdominal: Soft. Musculoskeletal: Normal range of motion. Neurological: She is alert and oriented to person, place, and time. Skin: Skin is warm and dry. Vitals reviewed. /74  Pulse 98  Temp 97.4 °F (36.3 °C) (Temporal)   Resp 18  Ht 5' 3\" (1.6 m)  Wt 183 lb (83 kg)  LMP  (LMP Unknown)  SpO2 98%  BMI 32.42 kg/m2    Assessment:      1.  Type 2 diabetes mellitus without complication, without long-term current use of insulin (formerly [facility])   DIABETES FOOT EXAM    Microalbumin / creatinine urine ratio    CBC    Comprehensive Metabolic Panel    Hemoglobin A1C    Creatinine, Random Urine    Protein, urine, random    TSH without Reflex    Urinalysis   2. Essential hypertension  HM DIABETES FOOT EXAM    Microalbumin / creatinine urine ratio    CBC    Comprehensive Metabolic Panel    Hemoglobin A1C    Creatinine, Random Urine    Protein, urine, random    TSH without Reflex    Urinalysis   3. Postablative hypothyroidism  HM DIABETES FOOT EXAM    Microalbumin / creatinine urine ratio    CBC    Comprehensive Metabolic Panel    Hemoglobin A1C    Creatinine, Random Urine    Protein, urine, random    TSH without Reflex    Urinalysis             Plan:      Return in about 6 months (around 4/2/2018). Patient Instructions   Continue same regimen. We will follow up on blood tests. Follow up again in 6 months. Orders Placed This Encounter   Procedures    INFLUENZA, QUADV, 3 YRS AND OLDER, IM, MDV, 0.5ML (FLUZONE QUADV)    Microalbumin / creatinine urine ratio     Standing Status:   Future     Standing Expiration Date:   10/2/2018    CBC     Standing Status:   Future     Standing Expiration Date:   10/2/2018    Comprehensive Metabolic Panel     Standing Status:   Future     Standing Expiration Date:   10/2/2018    Hemoglobin A1C     Standing Status:   Future     Standing Expiration Date:   10/2/2018    Creatinine, Random Urine     Standing Status:   Future     Standing Expiration Date:   10/2/2018    Protein, urine, random     Standing Status:   Future     Standing Expiration Date:   10/2/2018    TSH without Reflex     Standing Status:   Future     Standing Expiration Date:   10/2/2018    Urinalysis     Standing Status:   Future     Standing Expiration Date:   10/2/2018     DIABETES FOOT EXAM     No orders of the defined types were placed in this encounter. Patient given educational materials - see patient instructions. Discussed use, benefit, and side effects of prescribed medications. All patient questions answered. Pt voiced understanding. Reviewed health maintenance.   Instructed to continue current medications, diet and exercise. Patient agreed with treatment plan. Follow up as directed.      Electronically signed by Coco Hernandez DO on 10/2/2017 at 1:59 PM

## 2017-10-02 NOTE — PROGRESS NOTES
After obtaining consent, and per orders of Dr. Irina Garcia, injection of Influenza given in Left deltoid by Ekta Stokes. Patient instructed to remain in clinic for 20 minutes afterwards, and to report any adverse reaction to me immediately.

## 2017-10-02 NOTE — MR AVS SNAPSHOT
After Visit Summary             Edi Segovia   10/2/2017 10:00 AM   Office Visit    Description:  Female : 1950   Provider:  Terrie Dooley DO   Department:  [address] [place] [address] and Future Appointments         Below is a list of your follow-up and future appointments. This may not be a complete list as you may have made appointments directly with providers that we are not aware of or your providers may have made some for you. Please call your providers to confirm appointments. It is important to keep your appointments. Please bring your current insurance card, photo ID, co-pay, and all medication bottles to your appointment. If self-pay, payment is expected at the time of service. Your To-Do List     Future Appointments Provider Department Dept Phone    07/23/0587 5:91 AM Sammi Rowley, [address] [place] [address]    Please arrive 15 minutes prior to appointment, bring photo ID and insurance card. 2018 10:00 AM Terrie Dooley DO [facility] [phone]    Please arrive 15 minutes prior to appointment, bring photo ID and insurance card. Future Orders Complete By Expires    CBC [YQZ337 Custom]  10/2/2017 10/2/2018    Comprehensive Metabolic Panel [QLO92 Custom]  10/2/2017 10/2/2018    Creatinine, Random Urine [IXF693 Custom]  10/2/2017 10/2/2018    Hemoglobin A1C [LAB90 Custom]  10/2/2017 10/2/2018    Protein, urine, random [VAK663 Custom]  10/2/2017 10/2/2018    TSH without Reflex [ICO400 Custom]  10/2/2017 10/2/2018    Urinalysis [XXA489 Custom]  10/2/2017 10/2/2018    Microalbumin / creatinine urine ratio [BZU673 Custom]  2017 10/2/2018    Follow-Up    Return in about 6 months (around 2018).          Information from Your Visit        Department     Name Address Phone Fax    [facility] [address] [facility] [facility] [phone]      You Were Seen for: montelukast (SINGULAIR) 10 MG tablet TAKE 1 TABLET EVERY NIGHT    vitamin D (ERGOCALCIFEROL) 64107 UNITS CAPS capsule Take 1 capsule by mouth once a week    fluticasone (FLONASE) 50 MCG/ACT nasal spray USE 2 SPRAYS NASALLY EVERY DAY    azelastine (ASTELIN) 0.1 % nasal spray 2 sprays by Nasal route daily    Levothyroxine Sodium 100 MCG CAPS Take 1 tablet by mouth Daily    glimepiride (AMARYL) 1 MG tablet TAKE 1 TABLET EVERY MORNING BEFORE BREAKFAST    pantoprazole (PROTONIX) 40 MG tablet Take 1 tablet by mouth daily    topiramate (TOPAMAX) 100 MG tablet Take 1 tablet by mouth 2 times daily      Allergies              Glucophage [Metformin] Swelling    Metformin And Related       We Ordered/Performed the following            DIABETES FOOT EXAM     INFLUENZA, QUADV, 3 YRS AND OLDER, IM, MDV, 0.5ML (FLUZONE QUADV)          Additional Information        Basic Information     Date Of Birth Sex Race Ethnicity Preferred Language Preferred Written Language    1950 Female White Non-/Non  English English      Problem List as of 10/2/2017  Date Reviewed: 12/21/2015                Arthritis of both knees (Chronic)    Arthritis of both knees (Chronic)    Vitamin D deficiency    Seasonal allergies    Gastroesophageal reflux disease    Arthritis of both knees (Chronic)    Bilateral knee pain (Chronic)    Hypothyroidism    Hypertension    Hyperlipidemia      Immunizations as of 10/2/2017     Name Date    Influenza Virus Vaccine 11/7/2016    Influenza, Quadv, 3 Years and older, IM 10/2/2017    Pneumococcal 13-valent Conjugate (Qvcplru52) 12/19/2016    Pneumococcal Polysaccharide (Kujxobsxk85) 8/24/2015      Preventive Care        Date Due    Eye Exam By An Eye Doctor 12/21/2016    Urine Check For Kidney Problems 6/6/2017    Hepatitis C screening is recommended for all adults regardless of risk factors born between [facility] at least once (lifetime) who have never been tested. 12/14/2017 (Originally 1950)    Tetanus Combination Vaccine (1 - Tdap) 12/18/2017 (Originally [birth year]15/1969)    Hemoglobin A1C (Test For Long-Term Glucose Control) 12/21/2017    Cholesterol Screening 12/21/2017    Mammograms are recommended every 2 years for low/average risk patients aged 48 - 69, and every year for high risk patients per updated national guidelines. However these guidelines can be individualized by your provider. 12/21/2017    Diabetic Foot Exam 10/2/2018    Colonoscopy 1/1/2024            MyChart Signup           [employer] allows you to send messages to your doctor, view your test results, renew your prescriptions, schedule appointments, view visit notes, and more. How Do I Sign Up? 1. In your Internet browser, go to [email or web address]  2. Click on the Sign Up Now link in the Sign In box. You will see the New Member Sign Up page. 3. Enter your [employer] Access Code exactly as it appears below. You will not need to use this code after youve completed the sign-up process. If you do not sign up before the expiration date, you must request a new code. [employer] Access Code: [email or web address]  Expires: 11/13/2017 11:14 AM    4. Enter your Social Security Number ([id number]) and Date of Birth (mm/dd/yyyy) as indicated and click Submit. You will be taken to the next sign-up page. 5. Create a [employer] ID. This will be your [employer] login ID and cannot be changed, so think of one that is secure and easy to remember. 6. Create a [employer] password. You can change your password at any time. 7. Enter your Password Reset Question and Answer. This can be used at a later time if you forget your password. 8. Enter your e-mail address. You will receive e-mail notification when new information is available in [address] 9. Click Sign Up. You can now view your medical record.      Additional Information  If you have questions, please contact the physician practice where you

## 2017-10-03 DIAGNOSIS — E11.9 TYPE 2 DIABETES MELLITUS WITHOUT COMPLICATION, WITHOUT LONG-TERM CURRENT USE OF INSULIN (HCC): ICD-10-CM

## 2017-10-03 DIAGNOSIS — E89.0 POSTABLATIVE HYPOTHYROIDISM: ICD-10-CM

## 2017-10-03 DIAGNOSIS — I10 ESSENTIAL HYPERTENSION: ICD-10-CM

## 2017-10-03 LAB
ALBUMIN SERPL-MCNC: 4.1 G/DL (ref 3.5–5.2)
ALP BLD-CCNC: 100 U/L (ref 35–104)
ALT SERPL-CCNC: 25 U/L (ref 5–33)
ANION GAP SERPL CALCULATED.3IONS-SCNC: 10 MMOL/L (ref 7–19)
AST SERPL-CCNC: 23 U/L (ref 5–32)
BACTERIA: NEGATIVE /HPF
BILIRUB SERPL-MCNC: 0.3 MG/DL (ref 0.2–1.2)
BILIRUBIN URINE: NEGATIVE
BLOOD, URINE: ABNORMAL
BUN BLDV-MCNC: 19 MG/DL (ref 8–23)
CALCIUM SERPL-MCNC: 9.3 MG/DL (ref 8.8–10.2)
CHLORIDE BLD-SCNC: 104 MMOL/L (ref 98–111)
CLARITY: ABNORMAL
CO2: 28 MMOL/L (ref 22–29)
COLOR: YELLOW
CREAT SERPL-MCNC: 1.2 MG/DL (ref 0.5–0.9)
CREATININE URINE: 87.2 MG/DL (ref 4.2–622)
EPITHELIAL CELLS, UA: 8 /HPF (ref 0–5)
GFR NON-AFRICAN AMERICAN: 45
GLUCOSE BLD-MCNC: 95 MG/DL (ref 74–109)
GLUCOSE URINE: NEGATIVE MG/DL
HBA1C MFR BLD: 5.9 %
HCT VFR BLD CALC: 34.7 % (ref 37–47)
HEMOGLOBIN: 11.3 G/DL (ref 12–16)
HYALINE CASTS: 1 /HPF (ref 0–8)
KETONES, URINE: NEGATIVE MG/DL
LEUKOCYTE ESTERASE, URINE: NEGATIVE
MCH RBC QN AUTO: 29.3 PG (ref 27–31)
MCHC RBC AUTO-ENTMCNC: 32.6 G/DL (ref 33–37)
MCV RBC AUTO: 89.9 FL (ref 81–99)
MICROALBUMIN UR-MCNC: <1.2 MG/DL (ref 0–19)
MICROALBUMIN/CREAT UR-RTO: NORMAL MG/G
NITRITE, URINE: NEGATIVE
PDW BLD-RTO: 14.5 % (ref 11.5–14.5)
PH UA: 6
PLATELET # BLD: 293 K/UL (ref 130–400)
PMV BLD AUTO: 10.1 FL (ref 9.4–12.3)
POTASSIUM SERPL-SCNC: 4.5 MMOL/L (ref 3.5–5)
PROTEIN PROTEIN: 11 MG/DL (ref 15–45)
PROTEIN UA: NEGATIVE MG/DL
RBC # BLD: 3.86 M/UL (ref 4.2–5.4)
RBC UA: 1 /HPF (ref 0–4)
SODIUM BLD-SCNC: 142 MMOL/L (ref 136–145)
SPECIFIC GRAVITY UA: 1.01
TOTAL PROTEIN: 7.1 G/DL (ref 6.6–8.7)
TSH SERPL DL<=0.05 MIU/L-ACNC: 1.41 UIU/ML (ref 0.27–4.2)
UROBILINOGEN, URINE: 0.2 E.U./DL
WBC # BLD: 6.3 K/UL (ref 4.8–10.8)
WBC UA: 5 /HPF (ref 0–5)

## 2017-10-06 ENCOUNTER — TELEPHONE (OUTPATIENT)
Dept: PRIMARY CARE CLINIC | Age: 67
End: 2017-10-06

## 2017-10-06 RX ORDER — TOPIRAMATE 100 MG/1
100 TABLET, FILM COATED ORAL 2 TIMES DAILY
Qty: 180 TABLET | Refills: 0 | Status: SHIPPED | OUTPATIENT
Start: 2017-10-06 | End: 2018-05-14

## 2017-10-09 RX ORDER — GLIMEPIRIDE 1 MG/1
TABLET ORAL
Qty: 90 TABLET | Refills: 3 | Status: SHIPPED | OUTPATIENT
Start: 2017-10-09 | End: 2018-10-08 | Stop reason: SDUPTHER

## 2017-10-13 DIAGNOSIS — E55.9 VITAMIN D DEFICIENCY: ICD-10-CM

## 2017-10-13 DIAGNOSIS — J30.2 SEASONAL ALLERGIC RHINITIS, UNSPECIFIED ALLERGIC RHINITIS TRIGGER: ICD-10-CM

## 2017-10-13 RX ORDER — ERGOCALCIFEROL 1.25 MG/1
50000 CAPSULE ORAL WEEKLY
Qty: 12 CAPSULE | Refills: 3 | Status: SHIPPED | OUTPATIENT
Start: 2017-10-13 | End: 2018-03-21 | Stop reason: SDUPTHER

## 2017-10-13 RX ORDER — MONTELUKAST SODIUM 10 MG/1
10 TABLET ORAL NIGHTLY
Qty: 90 TABLET | Refills: 3 | Status: SHIPPED | OUTPATIENT
Start: 2017-10-13 | End: 2018-06-14

## 2017-10-13 RX ORDER — ALENDRONATE SODIUM 70 MG/1
70 TABLET ORAL
Qty: 12 TABLET | Refills: 3 | Status: SHIPPED | OUTPATIENT
Start: 2017-10-13 | End: 2018-06-14

## 2017-10-16 RX ORDER — HYDROCODONE BITARTRATE AND ACETAMINOPHEN 10; 325 MG/1; MG/1
1 TABLET ORAL EVERY 8 HOURS PRN
Qty: 90 TABLET | Refills: 0 | Status: SHIPPED | OUTPATIENT
Start: 2017-10-18 | End: 2017-11-20 | Stop reason: SDUPTHER

## 2017-11-02 ENCOUNTER — OFFICE VISIT (OUTPATIENT)
Dept: PRIMARY CARE CLINIC | Age: 67
End: 2017-11-02
Payer: MEDICARE

## 2017-11-02 VITALS
TEMPERATURE: 98.6 F | BODY MASS INDEX: 31.89 KG/M2 | WEIGHT: 180 LBS | HEART RATE: 112 BPM | OXYGEN SATURATION: 98 % | SYSTOLIC BLOOD PRESSURE: 108 MMHG | DIASTOLIC BLOOD PRESSURE: 66 MMHG | HEIGHT: 63 IN

## 2017-11-02 DIAGNOSIS — R94.31 ABNORMAL EKG: ICD-10-CM

## 2017-11-02 DIAGNOSIS — R07.9 CHEST PAIN, UNSPECIFIED TYPE: Primary | ICD-10-CM

## 2017-11-02 DIAGNOSIS — K21.00 GASTROESOPHAGEAL REFLUX DISEASE WITH ESOPHAGITIS: ICD-10-CM

## 2017-11-02 PROCEDURE — 4040F PNEUMOC VAC/ADMIN/RCVD: CPT | Performed by: NURSE PRACTITIONER

## 2017-11-02 PROCEDURE — G8484 FLU IMMUNIZE NO ADMIN: HCPCS | Performed by: NURSE PRACTITIONER

## 2017-11-02 PROCEDURE — 99214 OFFICE O/P EST MOD 30 MIN: CPT | Performed by: NURSE PRACTITIONER

## 2017-11-02 PROCEDURE — G8427 DOCREV CUR MEDS BY ELIG CLIN: HCPCS | Performed by: NURSE PRACTITIONER

## 2017-11-02 PROCEDURE — 3014F SCREEN MAMMO DOC REV: CPT | Performed by: NURSE PRACTITIONER

## 2017-11-02 PROCEDURE — 1123F ACP DISCUSS/DSCN MKR DOCD: CPT | Performed by: NURSE PRACTITIONER

## 2017-11-02 PROCEDURE — 3017F COLORECTAL CA SCREEN DOC REV: CPT | Performed by: NURSE PRACTITIONER

## 2017-11-02 PROCEDURE — 1090F PRES/ABSN URINE INCON ASSESS: CPT | Performed by: NURSE PRACTITIONER

## 2017-11-02 PROCEDURE — G8399 PT W/DXA RESULTS DOCUMENT: HCPCS | Performed by: NURSE PRACTITIONER

## 2017-11-02 PROCEDURE — 93000 ELECTROCARDIOGRAM COMPLETE: CPT | Performed by: NURSE PRACTITIONER

## 2017-11-02 PROCEDURE — 1036F TOBACCO NON-USER: CPT | Performed by: NURSE PRACTITIONER

## 2017-11-02 PROCEDURE — G8417 CALC BMI ABV UP PARAM F/U: HCPCS | Performed by: NURSE PRACTITIONER

## 2017-11-02 ASSESSMENT — ENCOUNTER SYMPTOMS
VOICE CHANGE: 0
CHEST TIGHTNESS: 0
DIARRHEA: 0
WHEEZING: 0
ABDOMINAL PAIN: 0
RHINORRHEA: 0
TROUBLE SWALLOWING: 0
NAUSEA: 0
VOMITING: 0
COUGH: 0
CONSTIPATION: 0
EYE REDNESS: 0
SHORTNESS OF BREATH: 0
SORE THROAT: 0
BLOOD IN STOOL: 0

## 2017-11-02 NOTE — PROGRESS NOTES
1.3 and 41. Past Medical History:   Diagnosis Date    Arthritis     Cataract of both eyes     Chronic back pain     Hyperlipidemia     Hypertension     Hypothyroidism     Lupus (systemic lupus erythematosus) (HCC)     Sickle cell anemia (HCC)     carrier    Type II or unspecified type diabetes mellitus without mention of complication, not stated as uncontrolled         Past Surgical History:   Procedure Laterality Date    BACK SURGERY      lumbar    CHOLECYSTECTOMY      COLONOSCOPY      FOOT SURGERY Left 1/2014    bunion and hammer toe    TUBAL LIGATION      TYMPANOPLASTY         Social History   Substance Use Topics    Smoking status: Never Smoker    Smokeless tobacco: Not on file    Alcohol use Yes      Comment: occasionally        Current Outpatient Prescriptions   Medication Sig Dispense Refill    HYDROcodone-acetaminophen (NORCO)  MG per tablet Take 1 tablet by mouth every 8 hours as needed for Pain (may fill 10/18/17) .  90 tablet 0    alendronate (FOSAMAX) 70 MG tablet Take 1 tablet by mouth every 7 days 12 tablet 3    montelukast (SINGULAIR) 10 MG tablet Take 1 tablet by mouth nightly 90 tablet 3    vitamin D (ERGOCALCIFEROL) 18070 units CAPS capsule Take 1 capsule by mouth once a week 12 capsule 3    glimepiride (AMARYL) 1 MG tablet TAKE 1 TABLET EVERY MORNING BEFORE BREAKFAST 90 tablet 3    topiramate (TOPAMAX) 100 MG tablet Take 1 tablet by mouth 2 times daily 180 tablet 0    Risedronate Sodium (ACTONEL) 150 MG TABS Take 1 tablet by mouth every 30 days 3 tablet 3    Glucose Blood (BLOOD GLUCOSE TEST STRIPS) STRP Test blood sugar daily and prn 100 strip 1    lisinopril-hydrochlorothiazide (PRINZIDE;ZESTORETIC) 20-12.5 MG per tablet TAKE 1 TABLET EVERY DAY 90 tablet 2    levothyroxine (SYNTHROID) 100 MCG tablet TAKE 1 TABLET EVERY DAY 90 tablet 2    atorvastatin (LIPITOR) 40 MG tablet TAKE 1 TABLET EVERY NIGHT 90 tablet 3    fluticasone (FLONASE) 50 MCG/ACT nasal spray USE 2 SPRAYS NASALLY EVERY DAY 48 g 1    azelastine (ASTELIN) 0.1 % nasal spray 2 sprays by Nasal route daily  3    pantoprazole (PROTONIX) 40 MG tablet Take 1 tablet by mouth daily 90 tablet 3     No current facility-administered medications for this visit. Allergies   Allergen Reactions    Glucophage [Metformin] Swelling    Metformin And Related          Subjective:      Review of Systems   Constitutional: Negative for activity change, appetite change, fatigue, fever and unexpected weight change. HENT: Negative for congestion, ear pain, nosebleeds, rhinorrhea, sore throat, trouble swallowing and voice change. Eyes: Negative for redness and visual disturbance. Respiratory: Negative for cough, chest tightness, shortness of breath and wheezing. Cardiovascular: Negative for chest pain, palpitations and leg swelling. Gastrointestinal: Negative for abdominal pain, blood in stool, constipation, diarrhea, nausea and vomiting. Endocrine: Negative for polydipsia, polyphagia and polyuria. Genitourinary: Negative for dysuria, frequency and urgency. Musculoskeletal: Negative for myalgias. Skin: Negative for rash and wound. Neurological: Negative for dizziness, speech difficulty, light-headedness and headaches. Psychiatric/Behavioral: Negative for agitation, confusion, self-injury and suicidal ideas. The patient is not nervous/anxious. Objective:     Physical Exam   Constitutional: She is oriented to person, place, and time. She appears well-developed and well-nourished. No distress. HENT:   Head: Normocephalic and atraumatic. Right Ear: External ear normal.   Left Ear: External ear normal.   Nose: Nose normal.   Mouth/Throat: Oropharynx is clear and moist. No oropharyngeal exudate. Eyes: Conjunctivae are normal. Pupils are equal, round, and reactive to light. Right eye exhibits no discharge. Left eye exhibits no discharge. Neck: Normal range of motion. Neck supple. Number of Occurrences:   1     Standing Expiration Date:   11/2/2018     Order Specific Question:   Reason for Exam?     Answer:   Chest pain    ECHO (Dobutamine) Pharmacological Stress Test     Standing Status:   Future     Standing Expiration Date:   1/5/2018     Order Specific Question:   Reason for exam:     Answer:   abnormal EKG. On and off chest pain. No orders of the defined types were placed in this encounter. Patient given educational materials - see patient instructions. Discussed use, benefit, and side effects of prescribed medications. All patient questions answered. Pt voiced understanding. Reviewed health maintenance. Instructed to continue current medications, diet and exercise. Patient agreed with treatment plan. Follow up as directed. Pt instructed that is symptoms worsen or persist they are to contact office or report to nearest ER. Pt voices understanding and agreement with this plan.      Electronically signed by PAUL Jain on 11/6/2017 at 9:09 PM

## 2017-11-13 ENCOUNTER — HOSPITAL ENCOUNTER (EMERGENCY)
Age: 67
Discharge: HOME | End: 2017-11-13
Attending: EMERGENCY MEDICINE
Payer: MEDICARE

## 2017-11-13 VITALS
WEIGHT: 180 LBS | HEART RATE: 81 BPM | TEMPERATURE: 97.7 F | DIASTOLIC BLOOD PRESSURE: 68 MMHG | SYSTOLIC BLOOD PRESSURE: 109 MMHG | RESPIRATION RATE: 18 BRPM | BODY MASS INDEX: 31.89 KG/M2 | OXYGEN SATURATION: 94 % | HEIGHT: 63 IN

## 2017-11-13 DIAGNOSIS — R68.84 JAW PAIN: Primary | ICD-10-CM

## 2017-11-13 LAB
ANION GAP SERPL CALCULATED.3IONS-SCNC: 14 MMOL/L (ref 7–19)
BUN BLDV-MCNC: 23 MG/DL (ref 8–23)
CALCIUM SERPL-MCNC: 8.6 MG/DL (ref 8.8–10.2)
CHLORIDE BLD-SCNC: 101 MMOL/L (ref 98–111)
CO2: 24 MMOL/L (ref 22–29)
CREAT SERPL-MCNC: 1.1 MG/DL (ref 0.5–0.9)
EKG P AXIS: 46 DEGREES
EKG P-R INTERVAL: 144 MS
EKG Q-T INTERVAL: 374 MS
EKG QRS DURATION: 84 MS
EKG QTC CALCULATION (BAZETT): 410 MS
EKG T AXIS: 13 DEGREES
GFR NON-AFRICAN AMERICAN: 49
GLUCOSE BLD-MCNC: 108 MG/DL (ref 74–109)
HCT VFR BLD CALC: 34.6 % (ref 37–47)
HEMOGLOBIN: 11.3 G/DL (ref 12–16)
MCH RBC QN AUTO: 29 PG (ref 27–31)
MCHC RBC AUTO-ENTMCNC: 32.7 G/DL (ref 33–37)
MCV RBC AUTO: 88.9 FL (ref 81–99)
PDW BLD-RTO: 14.5 % (ref 11.5–14.5)
PERFORMED ON: NORMAL
PERFORMED ON: NORMAL
PLATELET # BLD: 309 K/UL (ref 130–400)
PMV BLD AUTO: 10.4 FL (ref 9.4–12.3)
POC TROPONIN I: 0 NG/ML (ref 0–0.08)
POC TROPONIN I: 0 NG/ML (ref 0–0.08)
POTASSIUM SERPL-SCNC: 4.1 MMOL/L (ref 3.5–5)
RBC # BLD: 3.89 M/UL (ref 4.2–5.4)
SODIUM BLD-SCNC: 139 MMOL/L (ref 136–145)
WBC # BLD: 8.1 K/UL (ref 4.8–10.8)

## 2017-11-13 PROCEDURE — 93005 ELECTROCARDIOGRAM TRACING: CPT

## 2017-11-13 PROCEDURE — 80048 BASIC METABOLIC PNL TOTAL CA: CPT

## 2017-11-13 PROCEDURE — 99283 EMERGENCY DEPT VISIT LOW MDM: CPT

## 2017-11-13 PROCEDURE — 99283 EMERGENCY DEPT VISIT LOW MDM: CPT | Performed by: EMERGENCY MEDICINE

## 2017-11-13 PROCEDURE — 85027 COMPLETE CBC AUTOMATED: CPT

## 2017-11-13 PROCEDURE — 84484 ASSAY OF TROPONIN QUANT: CPT

## 2017-11-13 PROCEDURE — 36415 COLL VENOUS BLD VENIPUNCTURE: CPT

## 2017-11-13 ASSESSMENT — PAIN DESCRIPTION - FREQUENCY: FREQUENCY: CONTINUOUS

## 2017-11-13 ASSESSMENT — ENCOUNTER SYMPTOMS
ABDOMINAL PAIN: 0
EYE PAIN: 0
DIARRHEA: 0
SHORTNESS OF BREATH: 0
VOMITING: 0

## 2017-11-13 ASSESSMENT — PAIN DESCRIPTION - LOCATION: LOCATION: JAW

## 2017-11-13 ASSESSMENT — PAIN DESCRIPTION - PAIN TYPE: TYPE: ACUTE PAIN

## 2017-11-13 ASSESSMENT — PAIN SCALES - GENERAL: PAINLEVEL_OUTOF10: 5

## 2017-11-13 NOTE — ED PROVIDER NOTES
[facility]  eMERGENCY dEPARTMENT eNCOUnter      Pt Name: Timoteo Almaguer  MRN: [id number]  [place] 1950  Date of evaluation: 11/13/2017  Provider: Freida Ramsay MD    [address] [place] [address]       Chief Complaint   Patient presents with    Jaw Pain         HISTORY OF PRESENT ILLNESS   (Location/Symptom, Timing/Onset, Context/Setting, Quality, Duration, Modifying Factors, Severity)  Note limiting factors. Timoteo Almaguer is a 79 y.o. female who presents to the emergency department Complaining of left-sided jaw pain. Patient said she awoke around 2:30 AM and had left-sided jaw pain. This was in the left maxillary region. She said that soon after she awoke the pain resolved. She's been asymptomatic since then. Patient was concerned that the jaw pain she had earlier might be due to a heart attack. Patient said that about a month ago she had an episode where she developed chest discomfort that she describes as indigestion. She was not seen for this but did follow up with her primary care provider in the clinic on the 2nd of this month and is scheduled for an outpatient dobutamine stress test later this week. Has had no recurrence of chest discomfort since the pain which she describes as indigestion several weeks ago. Currently, she is asymptomatic. Again, she has no symptoms of any kind at this time. The only symptoms she had this morning was jaw pain that she woke with but this quickly resolved. No vision changes. No numbness or weakness. She has no history of coronary disease. No difficulty breathing. No abdominal pain. No vomiting or diarrhea. No numbness or weakness. No headaches. HPI    Nursing Notes were reviewed. REVIEW OF SYSTEMS    (2-9 systems for level 4, 10 or more for level 5)     Review of Systems   Constitutional: Negative for fever. Eyes: Negative for pain. Respiratory: Negative for shortness of breath. Cardiovascular: Negative for chest pain and palpitations.    Gastrointestinal: Negative for abdominal pain, diarrhea and vomiting. Genitourinary: Negative for dysuria. Skin: Negative for rash. Neurological: Negative for weakness and headaches. All other systems reviewed and are negative. A complete review of systems was performed and is negative except as noted above in the HPI. PAST MEDICAL HISTORY     Past Medical History:   Diagnosis Date    Arthritis     Cataract of both eyes     Chronic back pain     Hyperlipidemia     Hypertension     Hypothyroidism     Lupus (systemic lupus erythematosus) (HCC)     Sickle cell anemia (HCC)     carrier    Type II or unspecified type diabetes mellitus without mention of complication, not stated as uncontrolled          SURGICAL HISTORY       Past Surgical History:   Procedure Laterality Date    BACK SURGERY      lumbar    CHOLECYSTECTOMY      COLONOSCOPY      FOOT SURGERY Left 1/2014    bunion and hammer toe    TUBAL LIGATION      TYMPANOPLASTY           CURRENT MEDICATIONS       Previous Medications    ALENDRONATE (FOSAMAX) 70 MG TABLET    Take 1 tablet by mouth every 7 days    ATORVASTATIN (LIPITOR) 40 MG TABLET    TAKE 1 TABLET EVERY NIGHT    AZELASTINE (ASTELIN) 0.1 % NASAL SPRAY    2 sprays by Nasal route daily    FLUTICASONE (FLONASE) 50 MCG/ACT NASAL SPRAY    USE 2 SPRAYS NASALLY EVERY DAY    GLIMEPIRIDE (AMARYL) 1 MG TABLET    TAKE 1 TABLET EVERY MORNING BEFORE BREAKFAST    GLUCOSE BLOOD (BLOOD GLUCOSE TEST STRIPS) STRP    Test blood sugar daily and prn    HYDROCODONE-ACETAMINOPHEN (NORCO)  MG PER TABLET    Take 1 tablet by mouth every 8 hours as needed for Pain (may fill 10/18/17) .     LEVOTHYROXINE (SYNTHROID) 100 MCG TABLET    TAKE 1 TABLET EVERY DAY    LISINOPRIL-HYDROCHLOROTHIAZIDE (PRINZIDE;ZESTORETIC) 20-12.5 MG PER TABLET    TAKE 1 TABLET EVERY DAY    MONTELUKAST (SINGULAIR) 10 MG TABLET    Take 1 tablet by mouth nightly    PANTOPRAZOLE (PROTONIX) 40 MG TABLET    Take 1 tablet by mouth daily reactive to light. No scleral icterus. Neck: Normal range of motion. Neck supple. No JVD present. Cardiovascular: Normal rate, regular rhythm, normal heart sounds and intact distal pulses. Pulmonary/Chest: Effort normal and breath sounds normal. No respiratory distress. Abdominal: Soft. She exhibits no distension. There is no tenderness. Musculoskeletal: She exhibits no edema or tenderness. Neurological: She is alert and oriented to person, place, and time. She has normal strength. No cranial nerve deficit or sensory deficit. GCS eye subscore is 4. GCS verbal subscore is 5. GCS motor subscore is 6. Skin: Skin is warm and dry. Psychiatric: She has a normal mood and affect. Her behavior is normal.   Vitals reviewed. DIAGNOSTIC RESULTS     EKG: All EKG's are interpreted by the Emergency Department Physician who either signs or Co-signs this chart in the absence of a cardiologist.    Normal sinus rhythm. Normal QT interval. nonspecific ST Changes in anterolateral leads. Overall EKG has similar appearance to prior EKG done on 11/2/17. Repeat EKG shows normal sinus rhythm with normal QT interval. I do not see any evidence of acute ischemia. LABS:  Labs Reviewed   CBC - Abnormal; Notable for the following:        Result Value    RBC 3.89 (*)     Hemoglobin 11.3 (*)     Hematocrit 34.6 (*)     MCHC 32.7 (*)     All other components within normal limits   BASIC METABOLIC PANEL - Abnormal; Notable for the following:     CREATININE 1.1 (*)     GFR Non-African American 49 (*)     Calcium 8.6 (*)     All other components within normal limits   POCT TROPONIN   POCT TROPONIN   POCT VENOUS   POCT VENOUS       All other labs were within normal range or not returned as of this dictation.     EMERGENCY DEPARTMENT COURSE and DIFFERENTIAL DIAGNOSIS/MDM:   Vitals:    Vitals:    11/13/17 0323 11/13/17 0430   BP: 137/81 109/68   Pulse: 97 81   Resp: 18 18   Temp: 97.7 °F (36.5 °C)    TempSrc: Oral    SpO2: 98% 94%   Weight: 180 lb (81.6 kg)    Height: 5' 3\" (1.6 m)        MDM  Patient nontoxic on exam and in no distress. Troponin negative ×2 and patient asymptomatic so see no indication for admission at this time. Told her I do not know the etiology of the jaw pain she had earlier. She continues to be asymptomatic here. I see no indication for admission at this time. Recommend that she follow up with primary care provider for further evaluation and recheck. Told to also follow up as scheduled for stress test. Told no strenuous activity until released. Told to return to the ER for change or worsening symptoms or new concerns. Patient agreeable with plan. CONSULTS:  None    PROCEDURES:  Unless otherwise noted below, none     Procedures    FINAL IMPRESSION      1.  Jaw pain          DISPOSITION/PLAN   DISPOSITION Decision to Discharge    PATIENT REFERRED TO:  [facility] EMERGENCY DEPT  [facility]  [phone]    As needed, If symptoms worsen    [address] [facility] [phone]    Schedule an appointment as soon as possible for a visit         DISCHARGE MEDICATIONS:  New Prescriptions    No medications on file          (Please note that portions of this note were completed with a voice recognition program.  Efforts were made to edit the dictations but occasionally words are mis-transcribed.)    Andre Coulter MD (electronically signed)  Attending Emergency Physician        Andre Coulter MD  11/13/17 [id number]

## 2017-11-14 LAB
EKG P AXIS: 46 DEGREES
EKG P-R INTERVAL: 138 MS
EKG Q-T INTERVAL: 358 MS
EKG QRS DURATION: 86 MS
EKG QTC CALCULATION (BAZETT): 419 MS
EKG T AXIS: 29 DEGREES

## 2017-11-15 ENCOUNTER — HOSPITAL ENCOUNTER (OUTPATIENT)
Dept: NON INVASIVE DIAGNOSTICS | Age: 67
Discharge: HOME | End: 2017-11-15
Payer: MEDICARE

## 2017-11-15 VITALS
RESPIRATION RATE: 20 BRPM | DIASTOLIC BLOOD PRESSURE: 61 MMHG | WEIGHT: 178.57 LBS | HEART RATE: 91 BPM | SYSTOLIC BLOOD PRESSURE: 92 MMHG | BODY MASS INDEX: 31.63 KG/M2

## 2017-11-15 DIAGNOSIS — R94.31 ABNORMAL EKG: ICD-10-CM

## 2017-11-15 DIAGNOSIS — R07.9 CHEST PAIN, UNSPECIFIED TYPE: ICD-10-CM

## 2017-11-15 PROCEDURE — 6360000004 HC RX CONTRAST MEDICATION: Performed by: INTERNAL MEDICINE

## 2017-11-15 PROCEDURE — C8928 TTE W OR W/O FOL W/CON,STRES: HCPCS

## 2017-11-15 PROCEDURE — 2580000003 HC RX 258: Performed by: INTERNAL MEDICINE

## 2017-11-15 PROCEDURE — 6360000002 HC RX W HCPCS: Performed by: INTERNAL MEDICINE

## 2017-11-15 RX ORDER — SODIUM CHLORIDE 9 MG/ML
INJECTION, SOLUTION INTRAVENOUS
Status: COMPLETED | OUTPATIENT
Start: 2017-11-15 | End: 2017-11-15

## 2017-11-15 RX ORDER — DOBUTAMINE HYDROCHLORIDE 200 MG/100ML
10 INJECTION INTRAVENOUS CONTINUOUS PRN
Status: ACTIVE | OUTPATIENT
Start: 2017-11-15 | End: 2017-11-16

## 2017-11-15 RX ADMIN — PERFLUTREN 1 ML: 6.52 INJECTION, SUSPENSION INTRAVENOUS at 08:40

## 2017-11-15 RX ADMIN — DOBUTAMINE HYDROCHLORIDE 10 MCG/KG/MIN: 200 INJECTION INTRAVENOUS at 08:45

## 2017-11-15 RX ADMIN — SODIUM CHLORIDE: 9 INJECTION, SOLUTION INTRAVENOUS at 08:45

## 2017-11-20 RX ORDER — HYDROCODONE BITARTRATE AND ACETAMINOPHEN 10; 325 MG/1; MG/1
1 TABLET ORAL EVERY 8 HOURS PRN
Qty: 90 TABLET | Refills: 0 | Status: SHIPPED | OUTPATIENT
Start: 2017-11-21 | End: 2017-12-15 | Stop reason: SDUPTHER

## 2017-12-01 ENCOUNTER — OFFICE VISIT (OUTPATIENT)
Dept: URGENT CARE | Age: 67
End: 2017-12-01
Payer: MEDICARE

## 2017-12-01 VITALS
WEIGHT: 177.5 LBS | TEMPERATURE: 98.8 F | RESPIRATION RATE: 20 BRPM | OXYGEN SATURATION: 97 % | BODY MASS INDEX: 31.45 KG/M2 | HEART RATE: 112 BPM | SYSTOLIC BLOOD PRESSURE: 126 MMHG | DIASTOLIC BLOOD PRESSURE: 80 MMHG | HEIGHT: 63 IN

## 2017-12-01 DIAGNOSIS — I10 ESSENTIAL HYPERTENSION: ICD-10-CM

## 2017-12-01 DIAGNOSIS — R52 BODY ACHES: ICD-10-CM

## 2017-12-01 DIAGNOSIS — J00 ACUTE NASOPHARYNGITIS: Primary | ICD-10-CM

## 2017-12-01 LAB
INFLUENZA A ANTIBODY: NEGATIVE
INFLUENZA B ANTIBODY: NEGATIVE

## 2017-12-01 PROCEDURE — 3017F COLORECTAL CA SCREEN DOC REV: CPT | Performed by: PHYSICIAN ASSISTANT

## 2017-12-01 PROCEDURE — 1036F TOBACCO NON-USER: CPT | Performed by: PHYSICIAN ASSISTANT

## 2017-12-01 PROCEDURE — 87804 INFLUENZA ASSAY W/OPTIC: CPT | Performed by: PHYSICIAN ASSISTANT

## 2017-12-01 PROCEDURE — G8417 CALC BMI ABV UP PARAM F/U: HCPCS | Performed by: PHYSICIAN ASSISTANT

## 2017-12-01 PROCEDURE — 3014F SCREEN MAMMO DOC REV: CPT | Performed by: PHYSICIAN ASSISTANT

## 2017-12-01 PROCEDURE — G8399 PT W/DXA RESULTS DOCUMENT: HCPCS | Performed by: PHYSICIAN ASSISTANT

## 2017-12-01 PROCEDURE — 99213 OFFICE O/P EST LOW 20 MIN: CPT | Performed by: PHYSICIAN ASSISTANT

## 2017-12-01 PROCEDURE — 1123F ACP DISCUSS/DSCN MKR DOCD: CPT | Performed by: PHYSICIAN ASSISTANT

## 2017-12-01 PROCEDURE — G8427 DOCREV CUR MEDS BY ELIG CLIN: HCPCS | Performed by: PHYSICIAN ASSISTANT

## 2017-12-01 PROCEDURE — 1090F PRES/ABSN URINE INCON ASSESS: CPT | Performed by: PHYSICIAN ASSISTANT

## 2017-12-01 PROCEDURE — G8484 FLU IMMUNIZE NO ADMIN: HCPCS | Performed by: PHYSICIAN ASSISTANT

## 2017-12-01 PROCEDURE — 4040F PNEUMOC VAC/ADMIN/RCVD: CPT | Performed by: PHYSICIAN ASSISTANT

## 2017-12-01 RX ORDER — LEVOTHYROXINE SODIUM 0.1 MG/1
TABLET ORAL
Qty: 90 TABLET | Refills: 3 | Status: SHIPPED | OUTPATIENT
Start: 2017-12-01 | End: 2018-07-26

## 2017-12-01 RX ORDER — LISINOPRIL AND HYDROCHLOROTHIAZIDE 20; 12.5 MG/1; MG/1
TABLET ORAL
Qty: 90 TABLET | Refills: 3 | Status: SHIPPED | OUTPATIENT
Start: 2017-12-01 | End: 2018-07-02 | Stop reason: ALTCHOICE

## 2017-12-01 ASSESSMENT — ENCOUNTER SYMPTOMS
DIARRHEA: 0
ABDOMINAL PAIN: 0
RHINORRHEA: 1
SORE THROAT: 0
NAUSEA: 1
VOMITING: 0
COUGH: 1

## 2017-12-01 NOTE — PROGRESS NOTES
Subjective:      Patient ID: Leydi Osnabrock is a 79 y.o. female. HPI Dariel Hodgkins presents today with fever and chills. Symptoms developed yesterday. Max temp 99.6. Has runny nose. No nasal congestion. Slight cough that is occasionally productive. No sore throat or ear pain. Has had a headaches. No abdominal pain. Has had nausea. No vomiting or diarrhea. Has taken Advil and Tylenol for symptoms. Review of Systems   Constitutional: Positive for chills and fever. HENT: Positive for rhinorrhea. Negative for congestion, ear pain and sore throat. Respiratory: Positive for cough. Gastrointestinal: Positive for nausea. Negative for abdominal pain, diarrhea and vomiting. Neurological: Positive for headaches. All other systems reviewed and are negative. Objective:   Physical Exam   Constitutional: She is oriented to person, place, and time. She appears well-developed and well-nourished. No distress. HENT:   Head: Normocephalic and atraumatic. Right Ear: External ear normal.   Left Ear: External ear normal.   Nose: Nose normal.   Mouth/Throat: Oropharynx is clear and moist. No oropharyngeal exudate. Eyes: Conjunctivae are normal. Right eye exhibits no discharge. Left eye exhibits no discharge. Neck: Normal range of motion. Neck supple. Cardiovascular: Normal rate, regular rhythm and normal heart sounds. No murmur heard. Pulmonary/Chest: Effort normal and breath sounds normal. No respiratory distress. She has no wheezes. She has no rales. Abdominal: Soft. Bowel sounds are normal. She exhibits no distension and no mass. There is no tenderness. There is no rebound and no guarding. Lymphadenopathy:     She has no cervical adenopathy. Neurological: She is alert and oriented to person, place, and time. Skin: Skin is warm and dry. No rash noted. She is not diaphoretic. No erythema. No pallor. Psychiatric: She has a normal mood and affect.  Her behavior is normal. Judgment and thought content normal.   Nursing note and vitals reviewed. Results for orders placed or performed in visit on 12/01/17   POCT Influenza A/B   Result Value Ref Range    Influenza A Ab negative     Influenza B Ab negative        Assessment:      URI      Plan:      Symptoms are most likely due to viral illness so no antibiotics needed. - Notify clinic with any change in or worsening of symptoms.   - Return as needed.

## 2017-12-01 NOTE — PATIENT INSTRUCTIONS

## 2017-12-18 RX ORDER — HYDROCODONE BITARTRATE AND ACETAMINOPHEN 10; 325 MG/1; MG/1
1 TABLET ORAL EVERY 8 HOURS PRN
Qty: 90 TABLET | Refills: 0 | Status: SHIPPED | OUTPATIENT
Start: 2017-12-21 | End: 2018-01-18 | Stop reason: SDUPTHER

## 2017-12-22 ENCOUNTER — HOSPITAL ENCOUNTER (OUTPATIENT)
Dept: PAIN MANAGEMENT | Age: 67
Discharge: HOME | End: 2017-12-22
Payer: MEDICARE

## 2017-12-22 VITALS
DIASTOLIC BLOOD PRESSURE: 82 MMHG | BODY MASS INDEX: 31.89 KG/M2 | HEART RATE: 95 BPM | TEMPERATURE: 97.4 F | HEIGHT: 63 IN | OXYGEN SATURATION: 96 % | RESPIRATION RATE: 18 BRPM | SYSTOLIC BLOOD PRESSURE: 125 MMHG | WEIGHT: 180 LBS

## 2017-12-22 DIAGNOSIS — M25.561 PAIN IN BOTH KNEES, UNSPECIFIED CHRONICITY: ICD-10-CM

## 2017-12-22 DIAGNOSIS — M17.0 ARTHRITIS OF BOTH KNEES: ICD-10-CM

## 2017-12-22 DIAGNOSIS — G62.9 NEUROPATHY: ICD-10-CM

## 2017-12-22 DIAGNOSIS — M25.562 PAIN IN BOTH KNEES, UNSPECIFIED CHRONICITY: ICD-10-CM

## 2017-12-22 PROCEDURE — 99214 OFFICE O/P EST MOD 30 MIN: CPT

## 2017-12-22 PROCEDURE — 80307 DRUG TEST PRSMV CHEM ANLYZR: CPT

## 2017-12-22 RX ORDER — GABAPENTIN 300 MG/1
300 CAPSULE ORAL 2 TIMES DAILY
Qty: 60 CAPSULE | Refills: 2 | Status: SHIPPED | OUTPATIENT
Start: 2017-12-22 | End: 2018-03-23 | Stop reason: SDUPTHER

## 2017-12-22 ASSESSMENT — PAIN DESCRIPTION - FREQUENCY: FREQUENCY: INTERMITTENT

## 2017-12-22 ASSESSMENT — PAIN SCALES - GENERAL: PAINLEVEL_OUTOF10: 8

## 2017-12-22 ASSESSMENT — PAIN DESCRIPTION - DESCRIPTORS: DESCRIPTORS: ACHING;THROBBING

## 2017-12-22 ASSESSMENT — PAIN DESCRIPTION - ORIENTATION: ORIENTATION: RIGHT;LEFT

## 2017-12-22 ASSESSMENT — ACTIVITIES OF DAILY LIVING (ADL): EFFECT OF PAIN ON DAILY ACTIVITIES: LIMITS ACTIVITY

## 2017-12-22 ASSESSMENT — PAIN DESCRIPTION - LOCATION: LOCATION: KNEE;FOOT

## 2017-12-22 ASSESSMENT — PAIN DESCRIPTION - DIRECTION: RADIATING_TOWARDS: DOES NOT RADIATE

## 2017-12-22 ASSESSMENT — PAIN DESCRIPTION - ONSET: ONSET: ON-GOING

## 2017-12-22 ASSESSMENT — PAIN DESCRIPTION - PROGRESSION: CLINICAL_PROGRESSION: NOT CHANGED

## 2017-12-22 ASSESSMENT — PAIN DESCRIPTION - PAIN TYPE: TYPE: CHRONIC PAIN

## 2017-12-22 NOTE — PROGRESS NOTES
occasionally    Drug use: No    Sexual activity: Not Asked     Other Topics Concern    None     Social History Narrative    None      reports that she does not use drugs. History   Alcohol Use    Yes     Comment: occasionally     Review of Systems:  Other than stated above in the HPI, is negative     UDS done today? yes  Are you currently pregnant? no     Claire Santillan compliant? yes  Concern for prescription abuse? no          Past Medical History      Diagnosis Date    Arthritis     Cataract of both eyes     Chronic back pain     Hyperlipidemia     Hypertension     Hypothyroidism     Lupus (systemic lupus erythematosus) (HCC)     Sickle cell anemia (HCC)     carrier    Type II or unspecified type diabetes mellitus without mention of complication, not stated as uncontrolled        Surgical History  Past Surgical History:   Procedure Laterality Date    BACK SURGERY      lumbar    CHOLECYSTECTOMY      COLONOSCOPY      FOOT SURGERY Left 1/2014    bunion and hammer toe    TUBAL LIGATION      TYMPANOPLASTY         Medications  Current Outpatient Prescriptions   Medication Sig Dispense Refill    HYDROcodone-acetaminophen (NORCO)  MG per tablet Take 1 tablet by mouth every 8 hours as needed for Pain (may fill 12/21/17) .  90 tablet 0    levothyroxine (SYNTHROID) 100 MCG tablet TAKE 1 TABLET EVERY DAY 90 tablet 3    lisinopril-hydrochlorothiazide (PRINZIDE;ZESTORETIC) 20-12.5 MG per tablet TAKE 1 TABLET EVERY DAY 90 tablet 3    alendronate (FOSAMAX) 70 MG tablet Take 1 tablet by mouth every 7 days 12 tablet 3    montelukast (SINGULAIR) 10 MG tablet Take 1 tablet by mouth nightly 90 tablet 3    vitamin D (ERGOCALCIFEROL) 46858 units CAPS capsule Take 1 capsule by mouth once a week 12 capsule 3    glimepiride (AMARYL) 1 MG tablet TAKE 1 TABLET EVERY MORNING BEFORE BREAKFAST 90 tablet 3    topiramate (TOPAMAX) 100 MG tablet Take 1 tablet by mouth 2 times daily 180 tablet 0    Risedronate Sodium

## 2017-12-26 ENCOUNTER — TELEPHONE (OUTPATIENT)
Dept: PRIMARY CARE CLINIC | Age: 67
End: 2017-12-26

## 2017-12-28 ENCOUNTER — OFFICE VISIT (OUTPATIENT)
Dept: PRIMARY CARE CLINIC | Age: 67
End: 2017-12-28
Payer: MEDICARE

## 2017-12-28 ENCOUNTER — HOSPITAL ENCOUNTER (OUTPATIENT)
Dept: NON INVASIVE DIAGNOSTICS | Age: 67
Discharge: HOME | End: 2017-12-28
Payer: MEDICARE

## 2017-12-28 VITALS
OXYGEN SATURATION: 99 % | TEMPERATURE: 98.4 F | WEIGHT: 180 LBS | HEART RATE: 96 BPM | SYSTOLIC BLOOD PRESSURE: 124 MMHG | BODY MASS INDEX: 31.89 KG/M2 | HEIGHT: 63 IN | DIASTOLIC BLOOD PRESSURE: 78 MMHG

## 2017-12-28 DIAGNOSIS — M79.604 PAIN OF RIGHT LOWER EXTREMITY: ICD-10-CM

## 2017-12-28 DIAGNOSIS — M79.604 PAIN OF RIGHT LOWER EXTREMITY: Primary | ICD-10-CM

## 2017-12-28 DIAGNOSIS — Z86.718 HISTORY OF DVT OF LOWER EXTREMITY: ICD-10-CM

## 2017-12-28 DIAGNOSIS — Z23 NEED FOR PROPHYLACTIC VACCINATION AGAINST DIPHTHERIA-TETANUS-PERTUSSIS (DTP): ICD-10-CM

## 2017-12-28 PROCEDURE — G8417 CALC BMI ABV UP PARAM F/U: HCPCS | Performed by: NURSE PRACTITIONER

## 2017-12-28 PROCEDURE — 4040F PNEUMOC VAC/ADMIN/RCVD: CPT | Performed by: NURSE PRACTITIONER

## 2017-12-28 PROCEDURE — 99214 OFFICE O/P EST MOD 30 MIN: CPT | Performed by: NURSE PRACTITIONER

## 2017-12-28 PROCEDURE — 1090F PRES/ABSN URINE INCON ASSESS: CPT | Performed by: NURSE PRACTITIONER

## 2017-12-28 PROCEDURE — 3014F SCREEN MAMMO DOC REV: CPT | Performed by: NURSE PRACTITIONER

## 2017-12-28 PROCEDURE — G8399 PT W/DXA RESULTS DOCUMENT: HCPCS | Performed by: NURSE PRACTITIONER

## 2017-12-28 PROCEDURE — 1036F TOBACCO NON-USER: CPT | Performed by: NURSE PRACTITIONER

## 2017-12-28 PROCEDURE — G8427 DOCREV CUR MEDS BY ELIG CLIN: HCPCS | Performed by: NURSE PRACTITIONER

## 2017-12-28 PROCEDURE — G8484 FLU IMMUNIZE NO ADMIN: HCPCS | Performed by: NURSE PRACTITIONER

## 2017-12-28 PROCEDURE — 1123F ACP DISCUSS/DSCN MKR DOCD: CPT | Performed by: NURSE PRACTITIONER

## 2017-12-28 PROCEDURE — 3017F COLORECTAL CA SCREEN DOC REV: CPT | Performed by: NURSE PRACTITIONER

## 2017-12-28 PROCEDURE — 93971 EXTREMITY STUDY: CPT

## 2017-12-28 ASSESSMENT — ENCOUNTER SYMPTOMS
GASTROINTESTINAL NEGATIVE: 1
COUGH: 0
CHEST TIGHTNESS: 0
SHORTNESS OF BREATH: 0
EYES NEGATIVE: 1
RESPIRATORY NEGATIVE: 1

## 2017-12-28 NOTE — PROGRESS NOTES
(SYNTHROID) 100 MCG tablet TAKE 1 TABLET EVERY DAY 90 tablet 3    lisinopril-hydrochlorothiazide (PRINZIDE;ZESTORETIC) 20-12.5 MG per tablet TAKE 1 TABLET EVERY DAY 90 tablet 3    alendronate (FOSAMAX) 70 MG tablet Take 1 tablet by mouth every 7 days 12 tablet 3    montelukast (SINGULAIR) 10 MG tablet Take 1 tablet by mouth nightly 90 tablet 3    vitamin D (ERGOCALCIFEROL) 39791 units CAPS capsule Take 1 capsule by mouth once a week 12 capsule 3    glimepiride (AMARYL) 1 MG tablet TAKE 1 TABLET EVERY MORNING BEFORE BREAKFAST 90 tablet 3    topiramate (TOPAMAX) 100 MG tablet Take 1 tablet by mouth 2 times daily 180 tablet 0    Risedronate Sodium (ACTONEL) 150 MG TABS Take 1 tablet by mouth every 30 days 3 tablet 3    Glucose Blood (BLOOD GLUCOSE TEST STRIPS) STRP Test blood sugar daily and prn 100 strip 1    atorvastatin (LIPITOR) 40 MG tablet TAKE 1 TABLET EVERY NIGHT 90 tablet 3    fluticasone (FLONASE) 50 MCG/ACT nasal spray USE 2 SPRAYS NASALLY EVERY DAY 48 g 1    azelastine (ASTELIN) 0.1 % nasal spray 2 sprays by Nasal route daily  3    pantoprazole (PROTONIX) 40 MG tablet Take 1 tablet by mouth daily 90 tablet 3     No current facility-administered medications for this visit. Allergies   Allergen Reactions    Glucophage [Metformin] Swelling    Metformin And Related        Family History   Problem Relation Age of Onset    High Cholesterol Sister     High Cholesterol Brother     Sickle Cell Anemia Daughter     Sickle Cell Anemia Son          Subjective:      Review of Systems   Constitutional: Negative. HENT: Negative. Eyes: Negative. Respiratory: Negative. Negative for cough, chest tightness and shortness of breath. Cardiovascular: Negative. Negative for chest pain. Gastrointestinal: Negative. Endocrine: Negative. Genitourinary: Negative. Musculoskeletal: Negative. Right lower ext pain   Skin: Negative. Neurological: Negative.     Hematological: Negative. Psychiatric/Behavioral: Negative. Objective:     Physical Exam   Constitutional: She is oriented to person, place, and time. Vital signs are normal. She appears well-developed and well-nourished. HENT:   Head: Normocephalic and atraumatic. Right Ear: Hearing, tympanic membrane, external ear and ear canal normal.   Left Ear: Hearing, tympanic membrane, external ear and ear canal normal.   Nose: Nose normal.   Mouth/Throat: Uvula is midline, oropharynx is clear and moist and mucous membranes are normal.   Eyes: Conjunctivae, EOM and lids are normal. Pupils are equal, round, and reactive to light. Neck: Trachea normal and normal range of motion. Neck supple. No thyroid mass and no thyromegaly present. Cardiovascular: Normal rate, regular rhythm, normal heart sounds and normal pulses. Pulmonary/Chest: Effort normal and breath sounds normal.   Abdominal: Soft. Normal appearance and bowel sounds are normal.   Musculoskeletal: Normal range of motion. Cervical back: Normal. She exhibits normal range of motion and no tenderness. Thoracic back: Normal. She exhibits normal range of motion and no tenderness. Lumbar back: Normal. She exhibits normal range of motion and no tenderness. Right lower leg: She exhibits tenderness. Legs:  Neurological: She is alert and oriented to person, place, and time. She has normal strength. Skin: Skin is warm, dry and intact. Psychiatric: She has a normal mood and affect. Her speech is normal and behavior is normal. Judgment and thought content normal. Cognition and memory are normal.   Nursing note and vitals reviewed. /78   Pulse 96   Temp 98.4 °F (36.9 °C)   Ht 5' 3\" (1.6 m)   Wt 180 lb (81.6 kg)   LMP  (LMP Unknown)   SpO2 99%   BMI 31.89 kg/m²     Assessment:     1. Pain of right lower extremity  US DUP LOWER EXTREMITY RIGHT MADISYN   2. History of DVT of lower extremity  US DUP LOWER EXTREMITY RIGHT MADISYN   3. Need for prophylactic vaccination against diphtheria-tetanus-pertussis (DTP)  Tdap (ADACEL) 5-2-15.5 LF-MCG/0.5 injection       No results found for this visit on 12/28/17. Plan:     I doubt DVT is cause of pain, but patient reports previous DVT and similar symptoms. I am checking US of lower ext to rule out DVT. Return if symptoms worsen or fail to improve. Orders Placed This Encounter   Procedures    US DUP LOWER EXTREMITY RIGHT MADISYN     Standing Status:   Future     Standing Expiration Date:   12/28/2018       Orders Placed This Encounter   Medications    Tdap (ADACEL) 5-2-15.5 LF-MCG/0.5 injection     Sig: Inject 0.5 mLs into the muscle once for 1 dose     Dispense:  0.5 mL     Refill:  0        Patient given educational materials - see patient instructions. Discussed use, benefit, and side effects of prescribed medications. All patient questions answered. Pt voiced understanding. Reviewed health maintenance. Instructed to continue current medications, diet and exercise. Patient agreed with treatment plan. Follow up as directed.            Electronically signed by PAUL Simon on 12/28/2017 at 11:16 AM

## 2017-12-29 ENCOUNTER — TELEPHONE (OUTPATIENT)
Dept: PRIMARY CARE CLINIC | Age: 67
End: 2017-12-29

## 2017-12-29 NOTE — TELEPHONE ENCOUNTER
----- Message from Otis Babinski, APRN sent at 12/29/2017  9:19 AM CST -----  Please let patient know US of lower leg was negative.  Thanks

## 2018-01-02 LAB
AMPHETAMINES, URINE: NEGATIVE NG/ML
BARBITURATES, URINE: NEGATIVE NG/ML
BENZODIAZEPINES, URINE: NEGATIVE NG/ML
CANNABINOIDS, URINE: NEGATIVE NG/ML
COCAINE METABOLITE, URINE: NEGATIVE NG/ML
CODEINE, URINE: NEGATIVE
CREATININE, URINE: 70.9 MG/DL (ref 20–300)
ETHANOL U, QUAN: NEGATIVE %
FENTANYL URINE: NEGATIVE PG/ML
HYDROCODONE, UR CONF: 603 NG/ML
HYDROCODONE, URINE: POSITIVE
HYDROMORPHONE, URINE: NEGATIVE
MEPERIDINE, UR: NEGATIVE NG/ML
METHADONE SCREEN, URINE: NEGATIVE NG/ML
MORPHINE URINE: NEGATIVE
OPIATES, URINE: ABNORMAL NG/ML
OPIATES, URINE: POSITIVE NG/ML
OXYCODONE/OXYMORPHONE, UR: NEGATIVE NG/ML
PH, URINE: 5.4 (ref 4.5–8.9)
PHENCYCLIDINE, URINE: NEGATIVE NG/ML
PROPOXYPHENE, URINE: NEGATIVE NG/ML

## 2018-01-13 ENCOUNTER — OFFICE VISIT (OUTPATIENT)
Dept: URGENT CARE | Age: 68
End: 2018-01-13
Payer: MEDICARE

## 2018-01-13 VITALS
SYSTOLIC BLOOD PRESSURE: 111 MMHG | RESPIRATION RATE: 18 BRPM | BODY MASS INDEX: 32 KG/M2 | TEMPERATURE: 98.1 F | OXYGEN SATURATION: 99 % | HEART RATE: 108 BPM | WEIGHT: 180.6 LBS | HEIGHT: 63 IN | DIASTOLIC BLOOD PRESSURE: 73 MMHG

## 2018-01-13 DIAGNOSIS — K13.0 CHAPPED LIPS: Primary | ICD-10-CM

## 2018-01-13 PROCEDURE — 99213 OFFICE O/P EST LOW 20 MIN: CPT | Performed by: NURSE PRACTITIONER

## 2018-01-13 PROCEDURE — G8427 DOCREV CUR MEDS BY ELIG CLIN: HCPCS | Performed by: NURSE PRACTITIONER

## 2018-01-13 PROCEDURE — 1123F ACP DISCUSS/DSCN MKR DOCD: CPT | Performed by: NURSE PRACTITIONER

## 2018-01-13 PROCEDURE — G8417 CALC BMI ABV UP PARAM F/U: HCPCS | Performed by: NURSE PRACTITIONER

## 2018-01-13 PROCEDURE — 4040F PNEUMOC VAC/ADMIN/RCVD: CPT | Performed by: NURSE PRACTITIONER

## 2018-01-13 PROCEDURE — 3014F SCREEN MAMMO DOC REV: CPT | Performed by: NURSE PRACTITIONER

## 2018-01-13 PROCEDURE — G8399 PT W/DXA RESULTS DOCUMENT: HCPCS | Performed by: NURSE PRACTITIONER

## 2018-01-13 PROCEDURE — 1090F PRES/ABSN URINE INCON ASSESS: CPT | Performed by: NURSE PRACTITIONER

## 2018-01-13 PROCEDURE — G8484 FLU IMMUNIZE NO ADMIN: HCPCS | Performed by: NURSE PRACTITIONER

## 2018-01-13 PROCEDURE — 1036F TOBACCO NON-USER: CPT | Performed by: NURSE PRACTITIONER

## 2018-01-13 PROCEDURE — 3017F COLORECTAL CA SCREEN DOC REV: CPT | Performed by: NURSE PRACTITIONER

## 2018-01-13 ASSESSMENT — ENCOUNTER SYMPTOMS
RHINORRHEA: 0
PHOTOPHOBIA: 0
VOMITING: 0
NAUSEA: 0
COUGH: 0
BACK PAIN: 0
VOICE CHANGE: 0
SHORTNESS OF BREATH: 0
COLOR CHANGE: 0

## 2018-01-13 NOTE — PROGRESS NOTES
Pneumococcal low/med risk  Completed       Subjective:      Review of Systems   Constitutional: Negative for chills and fever. HENT: Negative for ear pain, hearing loss, rhinorrhea and voice change. Eyes: Negative for photophobia and visual disturbance. Respiratory: Negative for cough and shortness of breath. Cardiovascular: Negative for chest pain and palpitations. Gastrointestinal: Negative for nausea and vomiting. Endocrine: Negative. Negative for cold intolerance and heat intolerance. Genitourinary: Negative for difficulty urinating and flank pain. Musculoskeletal: Negative for back pain and neck pain. Skin: Negative for color change and rash. Allergic/Immunologic: Negative for environmental allergies and food allergies. Neurological: Negative for dizziness, speech difficulty and headaches. Hematological: Does not bruise/bleed easily. Psychiatric/Behavioral: Negative for sleep disturbance and suicidal ideas. Objective:     Physical Exam   Constitutional: She is oriented to person, place, and time. She appears well-developed and well-nourished. HENT:   Head: Atraumatic. Right Ear: External ear normal.   Left Ear: External ear normal.   Nose: Nose normal.   Mouth/Throat: Oropharynx is clear and moist.   Chapped lips/cracking   Eyes: Conjunctivae are normal. Pupils are equal, round, and reactive to light. Neck: Normal range of motion. Neck supple. Cardiovascular: Normal rate, regular rhythm, S1 normal, S2 normal and normal heart sounds. Pulmonary/Chest: Effort normal and breath sounds normal.   Abdominal: Soft. Bowel sounds are normal.   Musculoskeletal: Normal range of motion. Neurological: She is alert and oriented to person, place, and time. Skin: Skin is warm and dry. Psychiatric: She has a normal mood and affect. Her behavior is normal.   Nursing note and vitals reviewed.     /73   Pulse 108   Temp 98.1 °F (36.7 °C) (Oral)   Resp 18   Ht 5' 3\" (1.6 m)   Wt 180 lb 9.6 oz (81.9 kg)   LMP  (LMP Unknown)   SpO2 99%   BMI 31.99 kg/m²     Assessment:      1. Chapped lips         Plan:     1. Try vaseline and carmex to lips several times daily. 2. Drink plenty of water. 3. Bedside humidifier. 4. Avoid lipsticks, especially long lasting. Patient given educational materials - see patient instructions. Discussed use, benefit, and side effects of prescribed medications. All patient questions answered. Pt voiced understanding. Reviewed health maintenance. Instructed to continue current medications, diet and exercise. Patient agreed with treatment plan. Follow up as directed. MEDICATIONS:  No orders of the defined types were placed in this encounter. ORDERS:  No orders of the defined types were placed in this encounter. Follow-up:  Return if symptoms worsen or fail to improve. PATIENT INSTRUCTIONS:  Patient Instructions     1. Try vaseline and carmex to lips several times daily. 2. Drink plenty of water. 3. Bedside humidifier. Patient Education        Dry Skin: Care Instructions  Your Care Instructions  Dry skin is a common problem, especially in areas where the air is very dry. Dry skin can also become a problem as you get older and lose natural oils that keep your skin moist.  A tendency toward dry, itchy skin may run in families. Some problems with the body's defenses (immune system), allergies, or an infection with a fungus may also cause patches of dry skin. An over-the-counter cream may help your dry skin. If your skin problem does not get better with home treatment, your doctor may prescribe ointment. You may need antibiotics if you have a skin infection. Follow-up care is a key part of your treatment and safety. Be sure to make and go to all appointments, and call your doctor if you are having problems. It's also a good idea to know your test results and keep a list of the medicines you take.   How can you care for yourself at home? Showers and baths  · Keep showers and baths short, and use warm or lukewarm water. Don't use hot water. It takes off more of your skin's natural oils. · Use as little soap as you can. Choose a mild soap, such as Dove, Cetaphil, or Neutrogena. Or use a skin cleanser like Aquanil or Cetaphil. · If you are taking a bath, use soap only at the very end. Then rinse off all traces of soap with fresh water. Gently pat your skin dry with a towel. Skin creams and moisturizers  · Apply moisturizer or skin cream right away (within 3 minutes) after a bath or shower. Use a moisturizer at other times too, as often as you need it. · Moisturizing creams are better than lotions. Try brands like CeraVe cream, Cetaphil cream, or Eucerin cream.  Other tips  · When washing clothes, use a small amount of detergent. Don't use fabric softeners or dryer sheets. · For small areas of itchy skin, try an over-the-counter 1% hydrocortisone cream.  · If you have very dry hands, spread petroleum jelly (such as Vaseline) on your hands before bed. Wear thin cotton gloves while you sleep. If your feet are dry, spread Vaseline on them and wear socks while you sleep. When should you call for help? Call your doctor now or seek immediate medical care if:  ? · You have signs of infection, such as:  ¨ Pain, warmth, or swelling in the skin. ¨ Red streaks near a wound in the skin. ¨ Pus coming from a wound in your skin. ¨ A fever. ? Watch closely for changes in your health, and be sure to contact your doctor if:  ? · You do not get better as expected. Where can you learn more? Go to https://RVX.[employer]. org and sign in to your [employer] account. Enter Q018 in the SeeJay box to learn more about \"Dry Skin: Care Instructions. \"     If you do not have an account, please click on the \"Sign Up Now\" link. Current as of: October 13, 2016  Content Version: 11.5  © 4291-2085 Healthwise, [facility].  Care instructions adapted under license by [facility] ([facility]). If you have questions about a medical condition or this instruction, always ask your healthcare professional. [place] [address] any warranty or liability for your use of this information.          Electronically signed by PAUL Santana on 1/13/2018 at 2:42 PM

## 2018-01-13 NOTE — PATIENT INSTRUCTIONS
small areas of itchy skin, try an over-the-counter 1% hydrocortisone cream.  · If you have very dry hands, spread petroleum jelly (such as Vaseline) on your hands before bed. Wear thin cotton gloves while you sleep. If your feet are dry, spread Vaseline on them and wear socks while you sleep. When should you call for help? Call your doctor now or seek immediate medical care if:  ? · You have signs of infection, such as:  ¨ Pain, warmth, or swelling in the skin. ¨ Red streaks near a wound in the skin. ¨ Pus coming from a wound in your skin. ¨ A fever. ? Watch closely for changes in your health, and be sure to contact your doctor if:  ? · You do not get better as expected. Where can you learn more? Go to [email or web address]. org and sign in to your [employer] account. Enter Y251 in the [employer] box to learn more about \"Dry Skin: Care Instructions. \"     If you do not have an account, please click on the \"Sign Up Now\" link. Current as of: October 13, 2016  Content Version: 11.5  © 2456-6143 Healthwise, [employer]. Care instructions adapted under license by [facility] ([facility]). If you have questions about a medical condition or this instruction, always ask your healthcare professional. [address] any warranty or liability for your use of this information.

## 2018-01-18 RX ORDER — HYDROCODONE BITARTRATE AND ACETAMINOPHEN 10; 325 MG/1; MG/1
1 TABLET ORAL EVERY 8 HOURS PRN
Qty: 90 TABLET | Refills: 0 | Status: SHIPPED | OUTPATIENT
Start: 2018-01-18 | End: 2018-01-22 | Stop reason: SDUPTHER

## 2018-01-22 RX ORDER — HYDROCODONE BITARTRATE AND ACETAMINOPHEN 10; 325 MG/1; MG/1
1 TABLET ORAL EVERY 8 HOURS PRN
Qty: 90 TABLET | Refills: 0 | Status: SHIPPED | OUTPATIENT
Start: 2018-01-22 | End: 2018-02-19 | Stop reason: SDUPTHER

## 2018-02-19 RX ORDER — HYDROCODONE BITARTRATE AND ACETAMINOPHEN 10; 325 MG/1; MG/1
1 TABLET ORAL EVERY 8 HOURS PRN
Qty: 90 TABLET | Refills: 0 | Status: SHIPPED | OUTPATIENT
Start: 2018-02-21 | End: 2018-03-23 | Stop reason: SDUPTHER

## 2018-03-21 ENCOUNTER — OFFICE VISIT (OUTPATIENT)
Dept: PRIMARY CARE CLINIC | Age: 68
End: 2018-03-21
Payer: MEDICARE

## 2018-03-21 VITALS
HEIGHT: 63 IN | OXYGEN SATURATION: 99 % | SYSTOLIC BLOOD PRESSURE: 130 MMHG | HEART RATE: 103 BPM | TEMPERATURE: 98 F | BODY MASS INDEX: 31.22 KG/M2 | WEIGHT: 176.2 LBS | DIASTOLIC BLOOD PRESSURE: 68 MMHG

## 2018-03-21 DIAGNOSIS — K21.00 GASTROESOPHAGEAL REFLUX DISEASE WITH ESOPHAGITIS: ICD-10-CM

## 2018-03-21 DIAGNOSIS — M1A.0720 IDIOPATHIC CHRONIC GOUT OF LEFT FOOT WITHOUT TOPHUS: ICD-10-CM

## 2018-03-21 DIAGNOSIS — E55.9 VITAMIN D DEFICIENCY: ICD-10-CM

## 2018-03-21 DIAGNOSIS — E89.0 POSTABLATIVE HYPOTHYROIDISM: ICD-10-CM

## 2018-03-21 DIAGNOSIS — I10 ESSENTIAL HYPERTENSION: ICD-10-CM

## 2018-03-21 DIAGNOSIS — I10 ESSENTIAL HYPERTENSION: Primary | ICD-10-CM

## 2018-03-21 LAB
ALBUMIN SERPL-MCNC: 4.2 G/DL (ref 3.5–5.2)
ALP BLD-CCNC: 94 U/L (ref 35–104)
ALT SERPL-CCNC: 30 U/L (ref 5–33)
ANION GAP SERPL CALCULATED.3IONS-SCNC: 18 MMOL/L (ref 7–19)
AST SERPL-CCNC: 26 U/L (ref 5–32)
BILIRUB SERPL-MCNC: 0.4 MG/DL (ref 0.2–1.2)
BILIRUBIN URINE: NEGATIVE
BLOOD, URINE: NEGATIVE
BUN BLDV-MCNC: 20 MG/DL (ref 8–23)
CALCIUM SERPL-MCNC: 9.4 MG/DL (ref 8.8–10.2)
CHLORIDE BLD-SCNC: 102 MMOL/L (ref 98–111)
CLARITY: ABNORMAL
CO2: 24 MMOL/L (ref 22–29)
COLOR: YELLOW
CREAT SERPL-MCNC: 1.3 MG/DL (ref 0.5–0.9)
CREATININE URINE: 63.7 MG/DL (ref 4.2–622)
GFR NON-AFRICAN AMERICAN: 41
GLUCOSE BLD-MCNC: 94 MG/DL (ref 74–109)
GLUCOSE URINE: NEGATIVE MG/DL
HCT VFR BLD CALC: 34.8 % (ref 37–47)
HEMOGLOBIN: 11.3 G/DL (ref 12–16)
KETONES, URINE: NEGATIVE MG/DL
LEUKOCYTE ESTERASE, URINE: NEGATIVE
MCH RBC QN AUTO: 28.6 PG (ref 27–31)
MCHC RBC AUTO-ENTMCNC: 32.5 G/DL (ref 33–37)
MCV RBC AUTO: 88.1 FL (ref 81–99)
NITRITE, URINE: NEGATIVE
PDW BLD-RTO: 15.4 % (ref 11.5–14.5)
PH UA: 5.5
PLATELET # BLD: 302 K/UL (ref 130–400)
PMV BLD AUTO: 10.3 FL (ref 9.4–12.3)
POTASSIUM SERPL-SCNC: 4.1 MMOL/L (ref 3.5–5)
PROTEIN PROTEIN: 6 MG/DL (ref 15–45)
PROTEIN UA: NEGATIVE MG/DL
RBC # BLD: 3.95 M/UL (ref 4.2–5.4)
SODIUM BLD-SCNC: 144 MMOL/L (ref 136–145)
SPECIFIC GRAVITY UA: 1.01
TOTAL PROTEIN: 7.4 G/DL (ref 6.6–8.7)
TSH SERPL DL<=0.05 MIU/L-ACNC: 1.76 UIU/ML (ref 0.27–4.2)
URIC ACID, SERUM: 8.1 MG/DL (ref 2.4–5.7)
UROBILINOGEN, URINE: 0.2 E.U./DL
WBC # BLD: 7.6 K/UL (ref 4.8–10.8)

## 2018-03-21 PROCEDURE — G8482 FLU IMMUNIZE ORDER/ADMIN: HCPCS | Performed by: INTERNAL MEDICINE

## 2018-03-21 PROCEDURE — 3014F SCREEN MAMMO DOC REV: CPT | Performed by: INTERNAL MEDICINE

## 2018-03-21 PROCEDURE — 1123F ACP DISCUSS/DSCN MKR DOCD: CPT | Performed by: INTERNAL MEDICINE

## 2018-03-21 PROCEDURE — 3017F COLORECTAL CA SCREEN DOC REV: CPT | Performed by: INTERNAL MEDICINE

## 2018-03-21 PROCEDURE — G8427 DOCREV CUR MEDS BY ELIG CLIN: HCPCS | Performed by: INTERNAL MEDICINE

## 2018-03-21 PROCEDURE — 4040F PNEUMOC VAC/ADMIN/RCVD: CPT | Performed by: INTERNAL MEDICINE

## 2018-03-21 PROCEDURE — G8399 PT W/DXA RESULTS DOCUMENT: HCPCS | Performed by: INTERNAL MEDICINE

## 2018-03-21 PROCEDURE — G8417 CALC BMI ABV UP PARAM F/U: HCPCS | Performed by: INTERNAL MEDICINE

## 2018-03-21 PROCEDURE — 99213 OFFICE O/P EST LOW 20 MIN: CPT | Performed by: INTERNAL MEDICINE

## 2018-03-21 PROCEDURE — 1036F TOBACCO NON-USER: CPT | Performed by: INTERNAL MEDICINE

## 2018-03-21 PROCEDURE — 1090F PRES/ABSN URINE INCON ASSESS: CPT | Performed by: INTERNAL MEDICINE

## 2018-03-21 RX ORDER — ERGOCALCIFEROL 1.25 MG/1
50000 CAPSULE ORAL WEEKLY
Qty: 12 CAPSULE | Refills: 3 | Status: SHIPPED | OUTPATIENT
Start: 2018-03-21

## 2018-03-21 ASSESSMENT — ENCOUNTER SYMPTOMS
CONSTIPATION: 0
SHORTNESS OF BREATH: 0
BACK PAIN: 0
COUGH: 0
NAUSEA: 0
VOMITING: 0
DIARRHEA: 0

## 2018-03-21 NOTE — PROGRESS NOTES
Subjective:      Patient ID: Ada Melendez is a 76 y.o. female. JUDE Cerda comes in for problem visit. She has had 2 episodes where she developed dizziness upon standing. She has never experienced this before. She was in a seated position and stood up and was walking, then developed an episode of dizziness. This happened once at home and a second time while she was at [employer]. She has had a recent upper respiratory tract infection. She is on blood pressure medicines. It has been 2 weeks since she has had one of these spells. It sounds as though that her blood pressure has decreased. Review of Systems   Constitutional: Negative for activity change and fatigue. Respiratory: Negative for cough and shortness of breath. Cardiovascular: Negative for chest pain and leg swelling. Gastrointestinal: Negative for constipation, diarrhea, nausea and vomiting. Genitourinary: Negative for difficulty urinating and dysuria. Musculoskeletal: Negative for arthralgias and back pain. Neurological: Positive for dizziness and light-headedness. Negative for headaches. Objective:   Physical Exam   Constitutional: She is oriented to person, place, and time. She appears well-developed and well-nourished. HENT:   Head: Normocephalic and atraumatic. Mouth/Throat: Oropharynx is clear and moist.   Eyes: Conjunctivae are normal. Pupils are equal, round, and reactive to light. Cardiovascular: Normal rate, regular rhythm and normal heart sounds. Pulmonary/Chest: Effort normal and breath sounds normal.   Abdominal: Soft. Musculoskeletal: Normal range of motion. Neurological: She is alert and oriented to person, place, and time. Skin: Skin is warm and dry. Vitals reviewed. Assessment:      1. Essential hypertension  CBC    Comprehensive Metabolic Panel    Creatinine, Random Urine    Protein, urine, random    TSH without Reflex    Urinalysis   2.  Vitamin D deficiency  vitamin D (ERGOCALCIFEROL)

## 2018-03-23 ENCOUNTER — HOSPITAL ENCOUNTER (OUTPATIENT)
Dept: PAIN MANAGEMENT | Age: 68
Discharge: HOME | End: 2018-03-23
Payer: MEDICARE

## 2018-03-23 VITALS
RESPIRATION RATE: 18 BRPM | DIASTOLIC BLOOD PRESSURE: 87 MMHG | OXYGEN SATURATION: 94 % | WEIGHT: 173 LBS | SYSTOLIC BLOOD PRESSURE: 134 MMHG | TEMPERATURE: 96.8 F | HEART RATE: 94 BPM | HEIGHT: 63 IN | BODY MASS INDEX: 30.65 KG/M2

## 2018-03-23 DIAGNOSIS — M17.0 ARTHRITIS OF BOTH KNEES: ICD-10-CM

## 2018-03-23 PROCEDURE — 99214 OFFICE O/P EST MOD 30 MIN: CPT

## 2018-03-23 RX ORDER — GABAPENTIN 300 MG/1
300 CAPSULE ORAL 2 TIMES DAILY
Qty: 60 CAPSULE | Refills: 2 | Status: SHIPPED | OUTPATIENT
Start: 2018-03-23 | End: 2018-07-02 | Stop reason: SDUPTHER

## 2018-03-23 RX ORDER — HYDROCODONE BITARTRATE AND ACETAMINOPHEN 10; 325 MG/1; MG/1
1 TABLET ORAL EVERY 8 HOURS PRN
Qty: 90 TABLET | Refills: 0 | Status: SHIPPED | OUTPATIENT
Start: 2018-03-23 | End: 2018-04-20 | Stop reason: SDUPTHER

## 2018-03-23 ASSESSMENT — PAIN DESCRIPTION - LOCATION: LOCATION: BACK;KNEE

## 2018-03-23 ASSESSMENT — PAIN DESCRIPTION - ONSET: ONSET: ON-GOING

## 2018-03-23 ASSESSMENT — PAIN DESCRIPTION - PAIN TYPE: TYPE: CHRONIC PAIN

## 2018-03-23 ASSESSMENT — ACTIVITIES OF DAILY LIVING (ADL): EFFECT OF PAIN ON DAILY ACTIVITIES: LIMITS ACTIVITIES

## 2018-03-23 ASSESSMENT — PAIN DESCRIPTION - FREQUENCY: FREQUENCY: INTERMITTENT

## 2018-03-23 ASSESSMENT — PAIN DESCRIPTION - ORIENTATION: ORIENTATION: LOWER;RIGHT;LEFT

## 2018-03-23 ASSESSMENT — PAIN DESCRIPTION - DESCRIPTORS: DESCRIPTORS: ACHING;THROBBING

## 2018-03-23 ASSESSMENT — PAIN SCALES - GENERAL: PAINLEVEL_OUTOF10: 9

## 2018-03-23 ASSESSMENT — PAIN DESCRIPTION - PROGRESSION: CLINICAL_PROGRESSION: NOT CHANGED

## 2018-04-02 ENCOUNTER — OFFICE VISIT (OUTPATIENT)
Dept: PRIMARY CARE CLINIC | Age: 68
End: 2018-04-02
Payer: MEDICARE

## 2018-04-02 VITALS
TEMPERATURE: 97.6 F | SYSTOLIC BLOOD PRESSURE: 116 MMHG | HEART RATE: 67 BPM | WEIGHT: 178 LBS | DIASTOLIC BLOOD PRESSURE: 72 MMHG | OXYGEN SATURATION: 95 % | BODY MASS INDEX: 31.53 KG/M2

## 2018-04-02 DIAGNOSIS — I10 ESSENTIAL HYPERTENSION: ICD-10-CM

## 2018-04-02 DIAGNOSIS — E89.0 POSTABLATIVE HYPOTHYROIDISM: ICD-10-CM

## 2018-04-02 DIAGNOSIS — Z12.11 SCREEN FOR COLON CANCER: Primary | ICD-10-CM

## 2018-04-02 DIAGNOSIS — E11.9 TYPE 2 DIABETES MELLITUS WITHOUT COMPLICATION, WITHOUT LONG-TERM CURRENT USE OF INSULIN (HCC): ICD-10-CM

## 2018-04-02 PROCEDURE — G8427 DOCREV CUR MEDS BY ELIG CLIN: HCPCS | Performed by: INTERNAL MEDICINE

## 2018-04-02 PROCEDURE — 1123F ACP DISCUSS/DSCN MKR DOCD: CPT | Performed by: INTERNAL MEDICINE

## 2018-04-02 PROCEDURE — 1090F PRES/ABSN URINE INCON ASSESS: CPT | Performed by: INTERNAL MEDICINE

## 2018-04-02 PROCEDURE — 3014F SCREEN MAMMO DOC REV: CPT | Performed by: INTERNAL MEDICINE

## 2018-04-02 PROCEDURE — 99214 OFFICE O/P EST MOD 30 MIN: CPT | Performed by: INTERNAL MEDICINE

## 2018-04-02 PROCEDURE — 4040F PNEUMOC VAC/ADMIN/RCVD: CPT | Performed by: INTERNAL MEDICINE

## 2018-04-02 PROCEDURE — 1036F TOBACCO NON-USER: CPT | Performed by: INTERNAL MEDICINE

## 2018-04-02 PROCEDURE — G8417 CALC BMI ABV UP PARAM F/U: HCPCS | Performed by: INTERNAL MEDICINE

## 2018-04-02 PROCEDURE — G8399 PT W/DXA RESULTS DOCUMENT: HCPCS | Performed by: INTERNAL MEDICINE

## 2018-04-02 PROCEDURE — 3017F COLORECTAL CA SCREEN DOC REV: CPT | Performed by: INTERNAL MEDICINE

## 2018-04-02 PROCEDURE — 3046F HEMOGLOBIN A1C LEVEL >9.0%: CPT | Performed by: INTERNAL MEDICINE

## 2018-04-02 RX ORDER — ALLOPURINOL 100 MG/1
100 TABLET ORAL DAILY
Qty: 90 TABLET | Refills: 3 | Status: SHIPPED | OUTPATIENT
Start: 2018-04-02

## 2018-04-02 ASSESSMENT — ENCOUNTER SYMPTOMS
SINUS PRESSURE: 0
DIARRHEA: 0
SHORTNESS OF BREATH: 0
VOMITING: 0
COUGH: 0
CONSTIPATION: 1
SINUS PAIN: 0
NAUSEA: 0
BACK PAIN: 0

## 2018-04-02 ASSESSMENT — PATIENT HEALTH QUESTIONNAIRE - PHQ9
2. FEELING DOWN, DEPRESSED OR HOPELESS: 0
SUM OF ALL RESPONSES TO PHQ9 QUESTIONS 1 & 2: 0
1. LITTLE INTEREST OR PLEASURE IN DOING THINGS: 0
SUM OF ALL RESPONSES TO PHQ QUESTIONS 1-9: 0

## 2018-04-12 ENCOUNTER — TELEPHONE (OUTPATIENT)
Dept: URGENT CARE | Facility: CLINIC | Age: 68
End: 2018-04-12

## 2018-04-12 DIAGNOSIS — B37.9 ANTIBIOTIC-INDUCED YEAST INFECTION: ICD-10-CM

## 2018-04-12 DIAGNOSIS — B37.9 INFECTION DUE TO YEAST: Primary | ICD-10-CM

## 2018-04-12 DIAGNOSIS — T36.95XA ANTIBIOTIC-INDUCED YEAST INFECTION: ICD-10-CM

## 2018-04-12 RX ORDER — FLUCONAZOLE 150 MG/1
150 TABLET ORAL
Qty: 3 TABLET | Refills: 0 | Status: SHIPPED | OUTPATIENT
Start: 2018-04-12 | End: 2018-05-16

## 2018-04-12 NOTE — TELEPHONE ENCOUNTER
Pt came to clinic complaing of vaginal itching after taking abx, pt wanted yeast infection medication.

## 2018-04-14 ENCOUNTER — TELEPHONE (OUTPATIENT)
Dept: URGENT CARE | Facility: CLINIC | Age: 68
End: 2018-04-14

## 2018-04-14 DIAGNOSIS — N89.8 VAGINAL ITCHING: Primary | ICD-10-CM

## 2018-04-14 NOTE — TELEPHONE ENCOUNTER
Pt called stating she was still itching vaginally.  Per Temi she was ok with sending in vaginal cream.    Mireya Issa RN 4/14/2018 12:27 PM

## 2018-04-18 ENCOUNTER — OFFICE VISIT (OUTPATIENT)
Dept: GASTROENTEROLOGY | Facility: CLINIC | Age: 68
End: 2018-04-18

## 2018-04-18 VITALS
SYSTOLIC BLOOD PRESSURE: 136 MMHG | TEMPERATURE: 98.4 F | HEART RATE: 90 BPM | OXYGEN SATURATION: 98 % | WEIGHT: 174 LBS | HEIGHT: 63 IN | DIASTOLIC BLOOD PRESSURE: 88 MMHG | BODY MASS INDEX: 30.83 KG/M2

## 2018-04-18 DIAGNOSIS — Z83.71 FAMILY HISTORY OF COLONIC POLYPS: Primary | ICD-10-CM

## 2018-04-18 PROCEDURE — S0260 H&P FOR SURGERY: HCPCS | Performed by: NURSE PRACTITIONER

## 2018-04-18 NOTE — PROGRESS NOTES
Chief Complaint   Patient presents with   • Colonoscopy     10-29-13 had colon due 5 years diverticulosis     Subjective   HPI    Camila Wallace is a 68 y.o. female who presents to office for preventative maintenance.  There is not  a personal history of colon polyps.  There is not a history of colon cancer.  She does not have complaints of nausea/vomiting, change in bowels, weight loss, no BRBPR, no melena.  There is not a family history of colon cancer.  There is a family history of colon polyps-brother, daugther.  Pt last colonoscopy-2013 .  Bowels do move on regular basis.  Hx of chronic constipation, utilizes metamucil or herbal medication to improve bowel movement.    CScope (Dr Menchaca) 2013 diverticulosis  CScope (Dr Menchaca) 2008 diverticulosis  CScope (Dr Menchaca) 2003 diverticulosis    Past Medical History:   Diagnosis Date   • Allergic rhinitis    • Chronic sinusitis    • Sommer bullosa    • Disease of thyroid gland    • GERD (gastroesophageal reflux disease)    • Graves disease    • HLD (hyperlipidemia)    • HTN (hypertension)    • Obstructive sleep apnea    • Type 2 diabetes mellitus    • Vasomotor rhinitis      Past Surgical History:   Procedure Laterality Date   • CHOLECYSTECTOMY     • COLONOSCOPY  10/29/2013    diverticulosis   • FOOT SURGERY     • TUBAL ABDOMINAL LIGATION     • TYMPANIC MEMBRANE REPAIR       Outpatient Prescriptions Marked as Taking for the 4/18/18 encounter (Office Visit) with TONEY Gaston   Medication Sig Dispense Refill   • alendronate (FOSAMAX) 70 MG tablet Take 70 mg by mouth.     • allopurinol (ZYLOPRIM) 100 MG tablet Take 100 mg by mouth.     • atorvastatin (LIPITOR) 40 MG tablet Take 40 mg by mouth Daily.     • Diclofenac Sodium (PENNSAID) 2 % solution Apply 2 Squirts topically 2 (Two) Times a Day.     • fluticasone (FLONASE) 50 MCG/ACT nasal spray 2 sprays into each nostril Daily.     • gabapentin (NEURONTIN) 300 MG capsule Take 300 mg by mouth.     • glimepiride  (AMARYL) 1 MG tablet Take 1 mg by mouth Daily.     • HYDROcodone-acetaminophen (NORCO)  MG per tablet Take 1 tablet by mouth 3 (Three) Times a Day As Needed.     • ipratropium (ATROVENT) 0.06 % nasal spray 2 sprays into each nostril 2 (Two) Times a Day As Needed for Rhinitis. 15 mL 11   • levothyroxine (SYNTHROID, LEVOTHROID) 100 MCG tablet TAKE 1 TABLET EVERY DAY     • lisinopril-hydrochlorothiazide (PRINZIDE) 20-12.5 MG per tablet Take 1 tablet by mouth Daily.     • montelukast (SINGULAIR) 10 MG tablet Take 10 mg by mouth.     • pantoprazole (PROTONIX) 40 MG EC tablet Take 40 mg by mouth Daily.     • risedronate (ACTONEL) 150 MG tablet Take  by mouth.     • terconazole (TERAZOL 7) 0.4 % vaginal cream Insert 1 applicator into the vagina Every Night for 7 days. 45 g 0   • topiramate (TOPAMAX) 100 MG tablet Take 100 mg by mouth 2 (Two) Times a Day.     • vitamin D (ERGOCALCIFEROL) 88208 UNITS capsule capsule Take 50,000 Units by mouth 1 (One) Time Per Week.       Allergies   Allergen Reactions   • Metformin And Related Swelling     Social History     Social History   • Marital status:      Spouse name: N/A   • Number of children: N/A   • Years of education: N/A     Occupational History   • Not on file.     Social History Main Topics   • Smoking status: Never Smoker   • Smokeless tobacco: Never Used   • Alcohol use Yes      Comment: occasionally   • Drug use: No   • Sexual activity: Defer     Other Topics Concern   • Not on file     Social History Narrative   • No narrative on file     Family History   Problem Relation Age of Onset   • Diabetes Mother    • Heart disease Mother    • Hypertension Mother    • Hypertension Father    • Diabetes Brother    • Hypertension Brother    • Hyperlipidemia Brother    • Colon polyps Brother    • Hyperlipidemia Maternal Uncle    • Thyroid disease Maternal Uncle    • Cancer Maternal Grandmother    • Colon polyps Daughter      Review of Systems   Constitutional: Negative  for fatigue, fever and unexpected weight change.   HENT: Negative for hearing loss, sore throat and voice change.    Eyes: Negative for visual disturbance.   Respiratory: Negative for cough, shortness of breath and wheezing.    Cardiovascular: Negative for chest pain and palpitations.   Gastrointestinal: Negative for abdominal pain, blood in stool and vomiting.   Endocrine: Negative for polydipsia and polyuria.   Genitourinary: Negative for difficulty urinating, dysuria, hematuria and urgency.   Musculoskeletal: Negative for joint swelling and myalgias.   Skin: Negative for color change, rash and wound.   Neurological: Negative for dizziness, tremors, seizures and syncope.   Hematological: Does not bruise/bleed easily.   Psychiatric/Behavioral: Negative for agitation and confusion. The patient is not nervous/anxious.      Objective   Vitals:    04/18/18 0917   BP: 136/88   Pulse: 90   Temp: 98.4 °F (36.9 °C)   SpO2: 98%     Physical Exam   Constitutional: She is oriented to person, place, and time. She appears well-developed and well-nourished. She is cooperative.   HENT:   Head: Normocephalic and atraumatic.   Eyes: Conjunctivae are normal. Pupils are equal, round, and reactive to light. No scleral icterus.   Neck: Normal range of motion. Neck supple. No JVD present. No thyroid mass and no thyromegaly present.   Cardiovascular: Normal rate, regular rhythm and normal heart sounds.  Exam reveals no gallop and no friction rub.    No murmur heard.  Pulmonary/Chest: Effort normal and breath sounds normal. No accessory muscle usage. No respiratory distress. She has no wheezes. She has no rales.   Abdominal: Soft. Normal appearance and bowel sounds are normal. She exhibits no distension, no ascites and no mass. There is no hepatosplenomegaly. There is no tenderness. There is no rebound and no guarding.   Musculoskeletal: Normal range of motion. She exhibits no edema or tenderness.     Vascular Status -  Her right foot  exhibits normal foot vasculature  and no edema. Her left foot exhibits normal foot vasculature  and no edema.  Lymphadenopathy:     She has no cervical adenopathy.   Neurological: She is alert and oriented to person, place, and time. She has normal strength. Gait normal.   Skin: Skin is warm, dry and intact. No rash noted.     Imaging Results (most recent)     None        Body mass index is 30.82 kg/m².    Assessment/Plan   Camila was seen today for colonoscopy.    Diagnoses and all orders for this visit:    Family history of colonic polyps  Comments:  brother  Orders:  -     Case Request; Standing  -     Implement Anesthesia Orders Day of Procedure; Standing  -     Obtain Informed Consent; Standing  -     polyethylene glycol (GoLYTELY) 236 g solution; Take 3,785 mL by mouth 1 (One) Time for 1 dose. Take as directed  -     Case Request      COLONOSCOPY WITH ANESTHESIA (N/A)    Advised pt to stop ASA day prior to procedure and to stop use of NSAIDs, Fish Oil, and MV 5 days prior to procedure.  Tylenol based products are ok to take.  Pt verbalized understanding.    Pt to hold diabetes medication/insulin day of procedure to prevent any risk of complications of hypoglycemia intraprocedure.  If taking insulin 1/2 the PM dose as well    All risks, benefits, alternatives, and indications of colonoscopy procedure have been discussed with the patient. Risks to include perforation of the colon requiring possible surgery or colostomy, risk of bleeding from biopsies or removal of colon tissue, possibility of missing a colon polyp or cancer, or adverse drug reaction.  Benefits to include the diagnosis and management of disease of the colon and rectum. Alternatives to include barium enema, radiographic evaluation, lab testing or no intervention. Pt verbalizes understanding and agrees.     Patient's Body mass index is 30.82 kg/m². BMI is above normal parameters. Follow-up plan includes:  no follow-up required.      There are no  Patient Instructions on file for this visit.

## 2018-04-19 PROBLEM — Z83.719 FAMILY HISTORY OF COLONIC POLYPS: Status: ACTIVE | Noted: 2018-04-19

## 2018-04-19 PROBLEM — Z83.71 FAMILY HISTORY OF COLONIC POLYPS: Status: ACTIVE | Noted: 2018-04-19

## 2018-04-23 RX ORDER — HYDROCODONE BITARTRATE AND ACETAMINOPHEN 10; 325 MG/1; MG/1
1 TABLET ORAL EVERY 8 HOURS PRN
Qty: 90 TABLET | Refills: 0 | Status: SHIPPED | OUTPATIENT
Start: 2018-04-24 | End: 2018-05-21 | Stop reason: SDUPTHER

## 2018-04-24 ENCOUNTER — TELEPHONE (OUTPATIENT)
Dept: GASTROENTEROLOGY | Facility: CLINIC | Age: 68
End: 2018-04-24

## 2018-04-24 ENCOUNTER — HOSPITAL ENCOUNTER (OUTPATIENT)
Facility: HOSPITAL | Age: 68
Setting detail: HOSPITAL OUTPATIENT SURGERY
Discharge: HOME | End: 2018-04-24
Attending: INTERNAL MEDICINE | Admitting: INTERNAL MEDICINE

## 2018-04-24 ENCOUNTER — ANESTHESIA (OUTPATIENT)
Dept: GASTROENTEROLOGY | Facility: HOSPITAL | Age: 68
End: 2018-04-24

## 2018-04-24 ENCOUNTER — ANESTHESIA EVENT (OUTPATIENT)
Dept: GASTROENTEROLOGY | Facility: HOSPITAL | Age: 68
End: 2018-04-24

## 2018-04-24 VITALS
HEIGHT: 63 IN | OXYGEN SATURATION: 98 % | BODY MASS INDEX: 31.01 KG/M2 | HEART RATE: 115 BPM | SYSTOLIC BLOOD PRESSURE: 157 MMHG | WEIGHT: 175 LBS | RESPIRATION RATE: 18 BRPM | DIASTOLIC BLOOD PRESSURE: 104 MMHG | TEMPERATURE: 97.9 F

## 2018-04-24 DIAGNOSIS — Z83.71 FAMILY HISTORY OF COLONIC POLYPS: ICD-10-CM

## 2018-04-24 LAB — GLUCOSE BLDC GLUCOMTR-MCNC: 107 MG/DL (ref 70–130)

## 2018-04-24 PROCEDURE — 82962 GLUCOSE BLOOD TEST: CPT

## 2018-04-24 PROCEDURE — G0463 HOSPITAL OUTPT CLINIC VISIT: HCPCS | Performed by: INTERNAL MEDICINE

## 2018-04-24 RX ORDER — SODIUM CHLORIDE 9 MG/ML
500 INJECTION, SOLUTION INTRAVENOUS CONTINUOUS PRN
Status: DISCONTINUED | OUTPATIENT
Start: 2018-04-24 | End: 2018-04-24 | Stop reason: HOSPADM

## 2018-04-24 RX ORDER — SODIUM CHLORIDE 0.9 % (FLUSH) 0.9 %
3 SYRINGE (ML) INJECTION AS NEEDED
Status: DISCONTINUED | OUTPATIENT
Start: 2018-04-24 | End: 2018-04-24 | Stop reason: HOSPADM

## 2018-04-24 NOTE — ANESTHESIA PREPROCEDURE EVALUATION
Anesthesia Evaluation     Patient summary reviewed   no history of anesthetic complications:  NPO Solid Status: > 8 hours  NPO Liquid Status: < 2 hours           Airway   Mallampati: I  TM distance: >3 FB  Neck ROM: full  No difficulty expected  Dental          Pulmonary    (+) sleep apnea on CPAP,   (-) asthma, not a smoker  Cardiovascular   Exercise tolerance: good (4-7 METS)    (+) hypertension, hyperlipidemia,       Neuro/Psych  (-) seizures, TIA, CVA  GI/Hepatic/Renal/Endo    (+)  GERD,  renal disease, diabetes mellitus, hypothyroidism,   (-) liver disease    Musculoskeletal     Abdominal    Substance History      OB/GYN          Other                        Anesthesia Plan    ASA 2     general   total IV anesthesia(Last po at 1030)  intravenous induction   Anesthetic plan and risks discussed with patient.

## 2018-04-24 NOTE — NURSING NOTE
Pt did not have a ride home, I attempted to call her family multiple times but was unable to get a hold of them. I encouraged the patient to call other friends/family but she declined.

## 2018-04-24 NOTE — TELEPHONE ENCOUNTER
She arrived today  But couldn't/wouldn't find a ride  Got irritated per nursing and left before her c-scope was completed

## 2018-05-04 ENCOUNTER — OFFICE VISIT (OUTPATIENT)
Dept: URGENT CARE | Age: 68
End: 2018-05-04
Payer: MEDICARE

## 2018-05-04 VITALS
HEIGHT: 63 IN | BODY MASS INDEX: 31.01 KG/M2 | OXYGEN SATURATION: 99 % | DIASTOLIC BLOOD PRESSURE: 84 MMHG | TEMPERATURE: 98 F | WEIGHT: 175 LBS | SYSTOLIC BLOOD PRESSURE: 133 MMHG | HEART RATE: 93 BPM | RESPIRATION RATE: 18 BRPM

## 2018-05-04 DIAGNOSIS — J01.40 ACUTE NON-RECURRENT PANSINUSITIS: Primary | ICD-10-CM

## 2018-05-04 DIAGNOSIS — R09.82 POST-NASAL DRAINAGE: ICD-10-CM

## 2018-05-04 DIAGNOSIS — J02.9 SORE THROAT: ICD-10-CM

## 2018-05-04 LAB — S PYO AG THROAT QL: NORMAL

## 2018-05-04 PROCEDURE — 3017F COLORECTAL CA SCREEN DOC REV: CPT | Performed by: NURSE PRACTITIONER

## 2018-05-04 PROCEDURE — 99213 OFFICE O/P EST LOW 20 MIN: CPT | Performed by: NURSE PRACTITIONER

## 2018-05-04 PROCEDURE — 4040F PNEUMOC VAC/ADMIN/RCVD: CPT | Performed by: NURSE PRACTITIONER

## 2018-05-04 PROCEDURE — G8427 DOCREV CUR MEDS BY ELIG CLIN: HCPCS | Performed by: NURSE PRACTITIONER

## 2018-05-04 PROCEDURE — 87880 STREP A ASSAY W/OPTIC: CPT | Performed by: NURSE PRACTITIONER

## 2018-05-04 PROCEDURE — 1036F TOBACCO NON-USER: CPT | Performed by: NURSE PRACTITIONER

## 2018-05-04 PROCEDURE — G8417 CALC BMI ABV UP PARAM F/U: HCPCS | Performed by: NURSE PRACTITIONER

## 2018-05-04 PROCEDURE — 96372 THER/PROPH/DIAG INJ SC/IM: CPT | Performed by: NURSE PRACTITIONER

## 2018-05-04 PROCEDURE — 1123F ACP DISCUSS/DSCN MKR DOCD: CPT | Performed by: NURSE PRACTITIONER

## 2018-05-04 PROCEDURE — 1090F PRES/ABSN URINE INCON ASSESS: CPT | Performed by: NURSE PRACTITIONER

## 2018-05-04 PROCEDURE — G8399 PT W/DXA RESULTS DOCUMENT: HCPCS | Performed by: NURSE PRACTITIONER

## 2018-05-04 RX ORDER — AMOXICILLIN AND CLAVULANATE POTASSIUM 875; 125 MG/1; MG/1
1 TABLET, FILM COATED ORAL EVERY 12 HOURS
Qty: 20 TABLET | Refills: 0 | Status: SHIPPED | OUTPATIENT
Start: 2018-05-04 | End: 2018-05-14

## 2018-05-04 RX ORDER — DEXAMETHASONE SODIUM PHOSPHATE 10 MG/ML
10 INJECTION INTRAMUSCULAR; INTRAVENOUS ONCE
Status: COMPLETED | OUTPATIENT
Start: 2018-05-04 | End: 2018-05-04

## 2018-05-04 RX ADMIN — DEXAMETHASONE SODIUM PHOSPHATE 10 MG: 10 INJECTION INTRAMUSCULAR; INTRAVENOUS at 09:58

## 2018-05-04 ASSESSMENT — ENCOUNTER SYMPTOMS: SORE THROAT: 1

## 2018-05-11 ENCOUNTER — TELEPHONE (OUTPATIENT)
Dept: PRIMARY CARE CLINIC | Age: 68
End: 2018-05-11

## 2018-05-14 ENCOUNTER — OFFICE VISIT (OUTPATIENT)
Dept: PRIMARY CARE CLINIC | Age: 68
End: 2018-05-14
Payer: MEDICARE

## 2018-05-14 VITALS
OXYGEN SATURATION: 97 % | WEIGHT: 178.2 LBS | HEIGHT: 63 IN | BODY MASS INDEX: 31.57 KG/M2 | SYSTOLIC BLOOD PRESSURE: 122 MMHG | HEART RATE: 93 BPM | TEMPERATURE: 98.4 F | DIASTOLIC BLOOD PRESSURE: 80 MMHG

## 2018-05-14 DIAGNOSIS — Z11.59 ENCOUNTER FOR HEPATITIS C SCREENING TEST FOR LOW RISK PATIENT: ICD-10-CM

## 2018-05-14 DIAGNOSIS — Z91.81 AT HIGH RISK FOR FALLS: ICD-10-CM

## 2018-05-14 DIAGNOSIS — G56.03 BILATERAL CARPAL TUNNEL SYNDROME: Primary | ICD-10-CM

## 2018-05-14 LAB — HEPATITIS C ANTIBODY INTERPRETATION: NORMAL

## 2018-05-14 PROCEDURE — 1090F PRES/ABSN URINE INCON ASSESS: CPT | Performed by: FAMILY MEDICINE

## 2018-05-14 PROCEDURE — G8417 CALC BMI ABV UP PARAM F/U: HCPCS | Performed by: FAMILY MEDICINE

## 2018-05-14 PROCEDURE — G8399 PT W/DXA RESULTS DOCUMENT: HCPCS | Performed by: FAMILY MEDICINE

## 2018-05-14 PROCEDURE — 1036F TOBACCO NON-USER: CPT | Performed by: FAMILY MEDICINE

## 2018-05-14 PROCEDURE — 99213 OFFICE O/P EST LOW 20 MIN: CPT | Performed by: FAMILY MEDICINE

## 2018-05-14 PROCEDURE — 4040F PNEUMOC VAC/ADMIN/RCVD: CPT | Performed by: FAMILY MEDICINE

## 2018-05-14 PROCEDURE — 1123F ACP DISCUSS/DSCN MKR DOCD: CPT | Performed by: FAMILY MEDICINE

## 2018-05-14 PROCEDURE — 3017F COLORECTAL CA SCREEN DOC REV: CPT | Performed by: FAMILY MEDICINE

## 2018-05-14 PROCEDURE — G8427 DOCREV CUR MEDS BY ELIG CLIN: HCPCS | Performed by: FAMILY MEDICINE

## 2018-05-16 ENCOUNTER — TELEPHONE (OUTPATIENT)
Dept: PRIMARY CARE CLINIC | Age: 68
End: 2018-05-16

## 2018-05-16 ASSESSMENT — ENCOUNTER SYMPTOMS
COUGH: 0
DIARRHEA: 0
WHEEZING: 0
ABDOMINAL PAIN: 0
SHORTNESS OF BREATH: 0
VOMITING: 0
CHEST TIGHTNESS: 0
NAUSEA: 0
CONSTIPATION: 0

## 2018-05-19 ENCOUNTER — HOSPITAL ENCOUNTER (EMERGENCY)
Age: 68
Discharge: HOME | End: 2018-05-20
Attending: EMERGENCY MEDICINE
Payer: MEDICARE

## 2018-05-19 DIAGNOSIS — T78.40XA ALLERGIC REACTION, INITIAL ENCOUNTER: Primary | ICD-10-CM

## 2018-05-19 PROCEDURE — 6360000002 HC RX W HCPCS: Performed by: EMERGENCY MEDICINE

## 2018-05-19 PROCEDURE — S0028 INJECTION, FAMOTIDINE, 20 MG: HCPCS | Performed by: EMERGENCY MEDICINE

## 2018-05-19 PROCEDURE — 2500000003 HC RX 250 WO HCPCS: Performed by: EMERGENCY MEDICINE

## 2018-05-19 PROCEDURE — 96375 TX/PRO/DX INJ NEW DRUG ADDON: CPT

## 2018-05-19 PROCEDURE — 99282 EMERGENCY DEPT VISIT SF MDM: CPT

## 2018-05-19 PROCEDURE — 96374 THER/PROPH/DIAG INJ IV PUSH: CPT

## 2018-05-19 RX ORDER — DIPHENHYDRAMINE HYDROCHLORIDE 50 MG/ML
50 INJECTION INTRAMUSCULAR; INTRAVENOUS ONCE
Status: COMPLETED | OUTPATIENT
Start: 2018-05-19 | End: 2018-05-19

## 2018-05-19 RX ORDER — PREDNISONE 20 MG/1
20 TABLET ORAL 2 TIMES DAILY
Qty: 6 TABLET | Refills: 0 | Status: SHIPPED | OUTPATIENT
Start: 2018-05-19 | End: 2018-05-22

## 2018-05-19 RX ORDER — FAMOTIDINE 20 MG/1
20 TABLET, FILM COATED ORAL 2 TIMES DAILY
Qty: 6 TABLET | Refills: 0 | Status: SHIPPED | OUTPATIENT
Start: 2018-05-19 | End: 2018-06-14

## 2018-05-19 RX ORDER — METHYLPREDNISOLONE SODIUM SUCCINATE 125 MG/2ML
125 INJECTION, POWDER, LYOPHILIZED, FOR SOLUTION INTRAMUSCULAR; INTRAVENOUS ONCE
Status: COMPLETED | OUTPATIENT
Start: 2018-05-19 | End: 2018-05-19

## 2018-05-19 RX ADMIN — FAMOTIDINE 20 MG: 10 INJECTION, SOLUTION INTRAVENOUS at 21:26

## 2018-05-19 RX ADMIN — METHYLPREDNISOLONE SODIUM SUCCINATE 125 MG: 125 INJECTION, POWDER, FOR SOLUTION INTRAMUSCULAR; INTRAVENOUS at 21:26

## 2018-05-19 RX ADMIN — DIPHENHYDRAMINE HYDROCHLORIDE 50 MG: 50 INJECTION, SOLUTION INTRAMUSCULAR; INTRAVENOUS at 21:26

## 2018-05-19 ASSESSMENT — ENCOUNTER SYMPTOMS
NAUSEA: 0
STRIDOR: 0
ABDOMINAL PAIN: 0
SHORTNESS OF BREATH: 0
VOICE CHANGE: 0
TROUBLE SWALLOWING: 0
VOMITING: 0
DIARRHEA: 0

## 2018-05-20 VITALS
DIASTOLIC BLOOD PRESSURE: 78 MMHG | OXYGEN SATURATION: 100 % | HEART RATE: 80 BPM | BODY MASS INDEX: 29.88 KG/M2 | SYSTOLIC BLOOD PRESSURE: 118 MMHG | RESPIRATION RATE: 16 BRPM | WEIGHT: 175 LBS | TEMPERATURE: 97.8 F | HEIGHT: 64 IN

## 2018-05-20 PROCEDURE — 99283 EMERGENCY DEPT VISIT LOW MDM: CPT | Performed by: EMERGENCY MEDICINE

## 2018-05-21 RX ORDER — HYDROCODONE BITARTRATE AND ACETAMINOPHEN 10; 325 MG/1; MG/1
1 TABLET ORAL EVERY 8 HOURS PRN
Qty: 90 TABLET | Refills: 0 | Status: SHIPPED | OUTPATIENT
Start: 2018-05-24 | End: 2018-06-19 | Stop reason: SDUPTHER

## 2018-05-27 ENCOUNTER — HOSPITAL ENCOUNTER (EMERGENCY)
Age: 68
Discharge: LEFT BEFORE DISCHARGE | End: 2018-05-27
Payer: MEDICARE

## 2018-05-27 VITALS
HEART RATE: 99 BPM | BODY MASS INDEX: 31.01 KG/M2 | RESPIRATION RATE: 18 BRPM | OXYGEN SATURATION: 98 % | TEMPERATURE: 98.2 F | SYSTOLIC BLOOD PRESSURE: 116 MMHG | WEIGHT: 175 LBS | HEIGHT: 63 IN | DIASTOLIC BLOOD PRESSURE: 93 MMHG

## 2018-05-27 PROCEDURE — 4500000002 HC ER NO CHARGE

## 2018-06-14 ENCOUNTER — OFFICE VISIT (OUTPATIENT)
Dept: PRIMARY CARE CLINIC | Age: 68
End: 2018-06-14
Payer: MEDICARE

## 2018-06-14 VITALS
HEART RATE: 82 BPM | HEIGHT: 63 IN | TEMPERATURE: 97.4 F | OXYGEN SATURATION: 97 % | DIASTOLIC BLOOD PRESSURE: 78 MMHG | WEIGHT: 174.8 LBS | SYSTOLIC BLOOD PRESSURE: 120 MMHG | BODY MASS INDEX: 30.97 KG/M2

## 2018-06-14 DIAGNOSIS — Z23 NEED FOR VACCINE FOR DT (DIPHTHERIA-TETANUS): ICD-10-CM

## 2018-06-14 DIAGNOSIS — E89.0 POSTABLATIVE HYPOTHYROIDISM: ICD-10-CM

## 2018-06-14 DIAGNOSIS — E11.69 DIABETES MELLITUS TYPE 2 IN OBESE (HCC): ICD-10-CM

## 2018-06-14 DIAGNOSIS — H65.192 ACUTE MIDDLE EAR EFFUSION, LEFT: Primary | ICD-10-CM

## 2018-06-14 DIAGNOSIS — E78.49 OTHER HYPERLIPIDEMIA: ICD-10-CM

## 2018-06-14 DIAGNOSIS — I15.8 OTHER SECONDARY HYPERTENSION: ICD-10-CM

## 2018-06-14 DIAGNOSIS — E66.9 DIABETES MELLITUS TYPE 2 IN OBESE (HCC): ICD-10-CM

## 2018-06-14 LAB
ALBUMIN SERPL-MCNC: 4.1 G/DL (ref 3.5–5.2)
ALP BLD-CCNC: 94 U/L (ref 35–104)
ALT SERPL-CCNC: 52 U/L (ref 5–33)
ANION GAP SERPL CALCULATED.3IONS-SCNC: 16 MMOL/L (ref 7–19)
AST SERPL-CCNC: 39 U/L (ref 5–32)
BILIRUB SERPL-MCNC: 0.3 MG/DL (ref 0.2–1.2)
BUN BLDV-MCNC: 26 MG/DL (ref 8–23)
CALCIUM SERPL-MCNC: 9.3 MG/DL (ref 8.8–10.2)
CHLORIDE BLD-SCNC: 100 MMOL/L (ref 98–111)
CHOLESTEROL, TOTAL: 175 MG/DL (ref 160–199)
CO2: 23 MMOL/L (ref 22–29)
CREAT SERPL-MCNC: 1.4 MG/DL (ref 0.5–0.9)
GFR NON-AFRICAN AMERICAN: 37
GLUCOSE BLD-MCNC: 91 MG/DL (ref 74–109)
HBA1C MFR BLD: 5.9 %
HCT VFR BLD CALC: 38.2 % (ref 37–47)
HDLC SERPL-MCNC: 65 MG/DL (ref 65–121)
HEMOGLOBIN: 12 G/DL (ref 12–16)
LDL CHOLESTEROL CALCULATED: 94 MG/DL
MCH RBC QN AUTO: 28.1 PG (ref 27–31)
MCHC RBC AUTO-ENTMCNC: 31.4 G/DL (ref 33–37)
MCV RBC AUTO: 89.5 FL (ref 81–99)
PDW BLD-RTO: 15.4 % (ref 11.5–14.5)
PLATELET # BLD: 303 K/UL (ref 130–400)
PMV BLD AUTO: 10 FL (ref 9.4–12.3)
POTASSIUM SERPL-SCNC: 4.6 MMOL/L (ref 3.5–5)
RBC # BLD: 4.27 M/UL (ref 4.2–5.4)
SODIUM BLD-SCNC: 139 MMOL/L (ref 136–145)
TOTAL PROTEIN: 7.5 G/DL (ref 6.6–8.7)
TRIGL SERPL-MCNC: 81 MG/DL (ref 0–149)
TSH SERPL DL<=0.05 MIU/L-ACNC: 2.46 UIU/ML (ref 0.27–4.2)
WBC # BLD: 6.4 K/UL (ref 4.8–10.8)

## 2018-06-14 PROCEDURE — 1036F TOBACCO NON-USER: CPT | Performed by: NURSE PRACTITIONER

## 2018-06-14 PROCEDURE — 4040F PNEUMOC VAC/ADMIN/RCVD: CPT | Performed by: NURSE PRACTITIONER

## 2018-06-14 PROCEDURE — 2022F DILAT RTA XM EVC RTNOPTHY: CPT | Performed by: NURSE PRACTITIONER

## 2018-06-14 PROCEDURE — 3017F COLORECTAL CA SCREEN DOC REV: CPT | Performed by: NURSE PRACTITIONER

## 2018-06-14 PROCEDURE — 3046F HEMOGLOBIN A1C LEVEL >9.0%: CPT | Performed by: NURSE PRACTITIONER

## 2018-06-14 PROCEDURE — 1090F PRES/ABSN URINE INCON ASSESS: CPT | Performed by: NURSE PRACTITIONER

## 2018-06-14 PROCEDURE — G8399 PT W/DXA RESULTS DOCUMENT: HCPCS | Performed by: NURSE PRACTITIONER

## 2018-06-14 PROCEDURE — G8417 CALC BMI ABV UP PARAM F/U: HCPCS | Performed by: NURSE PRACTITIONER

## 2018-06-14 PROCEDURE — 1123F ACP DISCUSS/DSCN MKR DOCD: CPT | Performed by: NURSE PRACTITIONER

## 2018-06-14 PROCEDURE — 99214 OFFICE O/P EST MOD 30 MIN: CPT | Performed by: NURSE PRACTITIONER

## 2018-06-14 PROCEDURE — G8427 DOCREV CUR MEDS BY ELIG CLIN: HCPCS | Performed by: NURSE PRACTITIONER

## 2018-06-14 RX ORDER — ALENDRONATE SODIUM 70 MG/1
70 TABLET ORAL
COMMUNITY
Start: 2017-10-13

## 2018-06-14 ASSESSMENT — ENCOUNTER SYMPTOMS
GASTROINTESTINAL NEGATIVE: 1
RESPIRATORY NEGATIVE: 1
EYES NEGATIVE: 1

## 2018-06-18 ENCOUNTER — PREP FOR SURGERY (OUTPATIENT)
Dept: OTHER | Facility: HOSPITAL | Age: 68
End: 2018-06-18

## 2018-06-18 ENCOUNTER — TELEPHONE (OUTPATIENT)
Dept: GASTROENTEROLOGY | Facility: CLINIC | Age: 68
End: 2018-06-18

## 2018-06-18 DIAGNOSIS — Z83.71 FAMILY HISTORY OF COLONIC POLYPS: Primary | ICD-10-CM

## 2018-06-18 NOTE — TELEPHONE ENCOUNTER
PT CALLED TO RESCHEDULE HER APPT SHE IS SCHEDULED FOR 07/12/2018 CAN YOU PLEASE PUT IN CASE REQUEST AND SEND MEDICATION TO [address]

## 2018-06-19 ENCOUNTER — TELEPHONE (OUTPATIENT)
Dept: PRIMARY CARE CLINIC | Age: 68
End: 2018-06-19

## 2018-06-19 DIAGNOSIS — M17.0 ARTHRITIS OF BOTH KNEES: Primary | ICD-10-CM

## 2018-06-19 RX ORDER — HYDROCODONE BITARTRATE AND ACETAMINOPHEN 10; 325 MG/1; MG/1
1 TABLET ORAL EVERY 8 HOURS PRN
Qty: 90 TABLET | Refills: 0 | Status: SHIPPED | OUTPATIENT
Start: 2018-06-23 | End: 2018-07-02 | Stop reason: SDUPTHER

## 2018-06-20 ENCOUNTER — TELEPHONE (OUTPATIENT)
Dept: PRIMARY CARE CLINIC | Age: 68
End: 2018-06-20

## 2018-06-26 ENCOUNTER — TELEPHONE (OUTPATIENT)
Dept: PRIMARY CARE CLINIC | Age: 68
End: 2018-06-26

## 2018-07-02 ENCOUNTER — HOSPITAL ENCOUNTER (OUTPATIENT)
Dept: PAIN MANAGEMENT | Age: 68
Discharge: HOME | End: 2018-07-02
Payer: MEDICARE

## 2018-07-02 ENCOUNTER — OFFICE VISIT (OUTPATIENT)
Dept: OTOLARYNGOLOGY | Facility: CLINIC | Age: 68
End: 2018-07-02

## 2018-07-02 VITALS
HEIGHT: 63 IN | TEMPERATURE: 96.9 F | HEART RATE: 82 BPM | SYSTOLIC BLOOD PRESSURE: 146 MMHG | WEIGHT: 174 LBS | BODY MASS INDEX: 30.83 KG/M2 | OXYGEN SATURATION: 100 % | RESPIRATION RATE: 18 BRPM | DIASTOLIC BLOOD PRESSURE: 87 MMHG

## 2018-07-02 VITALS
DIASTOLIC BLOOD PRESSURE: 106 MMHG | HEIGHT: 63 IN | BODY MASS INDEX: 30.48 KG/M2 | TEMPERATURE: 97.3 F | SYSTOLIC BLOOD PRESSURE: 144 MMHG | WEIGHT: 172 LBS

## 2018-07-02 DIAGNOSIS — M17.0 ARTHRITIS OF BOTH KNEES: ICD-10-CM

## 2018-07-02 DIAGNOSIS — M79.641 BILATERAL HAND PAIN: ICD-10-CM

## 2018-07-02 DIAGNOSIS — J30.89 CHRONIC ALLERGIC RHINITIS DUE TO OTHER ALLERGIC TRIGGER, UNSPECIFIED SEASONALITY: Primary | ICD-10-CM

## 2018-07-02 DIAGNOSIS — M25.562 PAIN IN BOTH KNEES, UNSPECIFIED CHRONICITY: ICD-10-CM

## 2018-07-02 DIAGNOSIS — M54.5 CHRONIC MIDLINE LOW BACK PAIN, WITH SCIATICA PRESENCE UNSPECIFIED: ICD-10-CM

## 2018-07-02 DIAGNOSIS — J32.9 CHRONIC SINUSITIS, UNSPECIFIED LOCATION: ICD-10-CM

## 2018-07-02 DIAGNOSIS — M79.642 BILATERAL HAND PAIN: ICD-10-CM

## 2018-07-02 DIAGNOSIS — G89.29 CHRONIC MIDLINE LOW BACK PAIN, WITH SCIATICA PRESENCE UNSPECIFIED: ICD-10-CM

## 2018-07-02 DIAGNOSIS — M25.561 PAIN IN BOTH KNEES, UNSPECIFIED CHRONICITY: ICD-10-CM

## 2018-07-02 DIAGNOSIS — M47.817 LUMBOSACRAL SPONDYLOSIS WITHOUT MYELOPATHY: ICD-10-CM

## 2018-07-02 DIAGNOSIS — M25.532 PAIN IN BOTH WRISTS: ICD-10-CM

## 2018-07-02 DIAGNOSIS — M79.641 PAIN IN BOTH HANDS: Primary | ICD-10-CM

## 2018-07-02 DIAGNOSIS — M79.642 PAIN IN BOTH HANDS: Primary | ICD-10-CM

## 2018-07-02 DIAGNOSIS — M51.36 DDD (DEGENERATIVE DISC DISEASE), LUMBAR: Primary | ICD-10-CM

## 2018-07-02 DIAGNOSIS — M25.531 PAIN IN BOTH WRISTS: ICD-10-CM

## 2018-07-02 PROBLEM — M54.50 CHRONIC MIDLINE LOW BACK PAIN: Status: ACTIVE | Noted: 2018-07-02

## 2018-07-02 PROCEDURE — 99213 OFFICE O/P EST LOW 20 MIN: CPT

## 2018-07-02 PROCEDURE — 99213 OFFICE O/P EST LOW 20 MIN: CPT | Performed by: NURSE PRACTITIONER

## 2018-07-02 PROCEDURE — 99214 OFFICE O/P EST MOD 30 MIN: CPT | Performed by: NURSE PRACTITIONER

## 2018-07-02 RX ORDER — IPRATROPIUM BROMIDE 42 UG/1
2 SPRAY, METERED NASAL 2 TIMES DAILY PRN
Qty: 3 EACH | Refills: 3 | Status: SHIPPED | OUTPATIENT
Start: 2018-07-02 | End: 2019-04-08 | Stop reason: SDUPTHER

## 2018-07-02 RX ORDER — MONTELUKAST SODIUM 10 MG/1
10 TABLET ORAL DAILY
Qty: 90 TABLET | Refills: 3 | Status: SHIPPED | OUTPATIENT
Start: 2018-07-02 | End: 2019-04-09

## 2018-07-02 RX ORDER — LOSARTAN POTASSIUM 50 MG/1
50 TABLET ORAL DAILY
COMMUNITY
Start: 2018-06-27 | End: 2021-03-22

## 2018-07-02 RX ORDER — GABAPENTIN 300 MG/1
300 CAPSULE ORAL 2 TIMES DAILY
Qty: 180 CAPSULE | Refills: 0 | Status: SHIPPED | OUTPATIENT
Start: 2018-07-02 | End: 2018-09-30

## 2018-07-02 RX ORDER — HYDROCHLOROTHIAZIDE 12.5 MG/1
1 CAPSULE, GELATIN COATED ORAL DAILY
Refills: 3 | COMMUNITY
Start: 2018-06-29

## 2018-07-02 RX ORDER — HYDROCODONE BITARTRATE AND ACETAMINOPHEN 10; 325 MG/1; MG/1
TABLET ORAL
COMMUNITY
Start: 2018-07-23 | End: 2018-08-22

## 2018-07-02 RX ORDER — GABAPENTIN 300 MG/1
300 CAPSULE ORAL
COMMUNITY
Start: 2018-07-02 | End: 2018-09-30

## 2018-07-02 RX ORDER — LOSARTAN POTASSIUM 50 MG/1
2 TABLET ORAL DAILY
Refills: 3 | COMMUNITY
Start: 2018-06-27

## 2018-07-02 RX ORDER — HYDROCODONE BITARTRATE AND ACETAMINOPHEN 10; 325 MG/1; MG/1
1 TABLET ORAL EVERY 8 HOURS PRN
Qty: 90 TABLET | Refills: 0 | Status: SHIPPED | OUTPATIENT
Start: 2018-07-23 | End: 2020-07-25

## 2018-07-02 ASSESSMENT — ENCOUNTER SYMPTOMS
DOUBLE VISION: 0
BACK PAIN: 1
CONSTIPATION: 0
SORE THROAT: 0
SHORTNESS OF BREATH: 0
BLURRED VISION: 0
WHEEZING: 0

## 2018-07-02 ASSESSMENT — PAIN DESCRIPTION - PAIN TYPE: TYPE: CHRONIC PAIN

## 2018-07-02 ASSESSMENT — PAIN DESCRIPTION - DESCRIPTORS: DESCRIPTORS: ACHING;THROBBING

## 2018-07-02 ASSESSMENT — ACTIVITIES OF DAILY LIVING (ADL): EFFECT OF PAIN ON DAILY ACTIVITIES: LIMITS ACTIVITIES

## 2018-07-02 ASSESSMENT — PAIN SCALES - GENERAL: PAINLEVEL_OUTOF10: 9

## 2018-07-02 ASSESSMENT — PAIN DESCRIPTION - LOCATION: LOCATION: BACK;KNEE

## 2018-07-02 ASSESSMENT — PAIN DESCRIPTION - PROGRESSION: CLINICAL_PROGRESSION: NOT CHANGED

## 2018-07-02 ASSESSMENT — PAIN DESCRIPTION - ONSET: ONSET: ON-GOING

## 2018-07-02 ASSESSMENT — PAIN DESCRIPTION - FREQUENCY: FREQUENCY: INTERMITTENT

## 2018-07-02 ASSESSMENT — PAIN DESCRIPTION - ORIENTATION: ORIENTATION: RIGHT;LOWER

## 2018-07-02 NOTE — PROGRESS NOTES
[facility]  Office Note    Patient Name: Tamara Coates    MR #: [id number]    Account #: [de-identified]    : 1950    Age: 76 y.o. Sex: female    Date: 2018    PCP: PAUL Porter    Chief Complaint:   Chief Complaint   Patient presents with    Knee Pain     bilateral with left > right    Hand Pain     bilateral    Lower Back Pain     mild pain today       History of Present Illness:     Tamara Coates is a 76 y.o. female who presents to the office for 3 month follow-up of Chronic lower back and bilateral knee pain. Patient reports lower back pain on and off for years no surgery reported. No loss of bowel or bladder. No reports of radicular leg pain-occasional bilateral thigh pain. Reports has had lumbar injections in the past with some effectiveness. Patient with a history of arthritis bilateral knees injections in the past including Supartz with effectiveness. Most recently was diagnosed \"carpal tunnel\"- reports which urgent care about a month ago and was given braces. Knee Pain   This is a chronic problem. The current episode started more than 1 year ago. The problem occurs daily. The problem has been unchanged. Associated symptoms include myalgias. Pertinent negatives include no chest pain, chills, fever, rash or sore throat. The symptoms are aggravated by bending, exertion, twisting and standing. She has tried ice, rest, sleep, relaxation and lying down for the symptoms. The treatment provided mild relief. Hand Pain   This is a chronic problem. The current episode started more than 1 year ago. The problem occurs intermittently. The problem has been unchanged. Associated symptoms include myalgias. Pertinent negatives include no chest pain, chills, fever, rash or sore throat. The symptoms are aggravated by exertion. She has tried ice and heat for the symptoms. The treatment provided no relief. Back Pain   This is a chronic problem.  The current CHOLECYSTECTOMY      COLONOSCOPY      FOOT SURGERY Left 1/2014    bunion and hammer toe    TUBAL LIGATION      TYMPANOPLASTY          Family History  family history includes High Cholesterol in her brother and sister; Sickle Cell Anemia in her daughter and son. Social History    Social History   Substance Use Topics    Smoking status: Never Smoker    Smokeless tobacco: Never Used    Alcohol use Yes      Comment: occasionally       Allergies  Glucophage [metformin] and Metformin and related     Current Medications  Current Outpatient Prescriptions   Medication Sig Dispense Refill    hydrochlorothiazide (MICROZIDE) 12.5 MG capsule Take 1 capsule by mouth daily  3    losartan (COZAAR) 50 MG tablet Take 2 tablets by mouth daily  3    HYDROcodone-acetaminophen (NORCO)  MG per tablet Take 1 tablet by mouth every 8 hours as needed for Pain for up to 30 days. Vitaliy Pinon Date: 6/23/18 90 tablet 0    alendronate (FOSAMAX) 70 MG tablet Take 70 mg by mouth      Mis. Devices (WRIST BRACE) MISC Bilateral carpal tunnel brace 2 each 0    allopurinol (ZYLOPRIM) 100 MG tablet Take 1 tablet by mouth daily 90 tablet 3    gabapentin (NEURONTIN) 300 MG capsule Take 1 capsule by mouth 2 times daily for 90 days.  60 capsule 2    vitamin D (ERGOCALCIFEROL) 98074 units CAPS capsule Take 1 capsule by mouth once a week 12 capsule 3    levothyroxine (SYNTHROID) 100 MCG tablet TAKE 1 TABLET EVERY DAY 90 tablet 3    glimepiride (AMARYL) 1 MG tablet TAKE 1 TABLET EVERY MORNING BEFORE BREAKFAST 90 tablet 3    topiramate (TOPAMAX) 100 MG tablet Take 1 tablet by mouth 2 times daily 180 tablet 0    Glucose Blood (BLOOD GLUCOSE TEST STRIPS) STRP Test blood sugar daily and prn 100 strip 1    atorvastatin (LIPITOR) 40 MG tablet TAKE 1 TABLET EVERY NIGHT 90 tablet 3    fluticasone (FLONASE) 50 MCG/ACT nasal spray USE 2 SPRAYS NASALLY EVERY DAY 48 g 1    azelastine (ASTELIN) 0.1 % nasal spray 2 sprays by Nasal route daily 3    pantoprazole (PROTONIX) 40 MG tablet Take 1 tablet by mouth daily 90 tablet 3     No current facility-administered medications for this encounter. Review of Systems:  Review of Systems   Constitutional: Negative for chills and fever. HENT: Negative for nosebleeds and sore throat. Eyes: Negative for blurred vision and double vision. Respiratory: Negative for shortness of breath and wheezing. Cardiovascular: Negative for chest pain. Gastrointestinal: Negative for constipation. Genitourinary: Negative for flank pain. Musculoskeletal: Positive for back pain and myalgias. Skin: Negative for rash. Neurological: Negative for seizures and loss of consciousness. Endo/Heme/Allergies: Does not bruise/bleed easily. Psychiatric/Behavioral: Negative for depression. 14 point ROS negative besides that noted in HPI    Physical Exam:    Vitals:    07/02/18 0825   BP: (!) 146/87   Pulse: 82   Resp: 18   Temp: 96.9 °F (36.1 °C)   TempSrc: Oral   SpO2: 100%   Weight: 174 lb (78.9 kg)   Height: 5' 3\" (1.6 m)       Body mass index is 30.82 kg/m². Physical Exam   Constitutional: She is oriented to person, place, and time. She appears well-developed and well-nourished. HENT:   Head: Normocephalic. Right Ear: External ear normal.   Left Ear: External ear normal.   Nose: Nose normal.   Eyes: Conjunctivae and EOM are normal. Pupils are equal, round, and reactive to light. Neck: Normal range of motion. Neck supple. Cardiovascular: Normal rate and regular rhythm. Pulmonary/Chest: Effort normal and breath sounds normal.   Abdominal: Soft. Bowel sounds are normal. There is no hepatosplenomegaly. Musculoskeletal:        Right knee: She exhibits normal range of motion. Tenderness found. Left knee: She exhibits normal range of motion. Tenderness found.         Lumbar back: She exhibits decreased range of motion ( Flexion and extension with lumbar tenderness) and tenderness ( Midline lumbar and bilateral SI joint tenderness on palpation). Right hand: She exhibits tenderness. Left hand: She exhibits tenderness. Bilateral knee pain with oral Bryan flexion extension within normal limits    Bilateral wrist with slight positive reverse Phalen's and Tinel's test   Neurological: She is alert and oriented to person, place, and time. She displays no seizure activity. Coordination and gait normal.   Reflex Scores:       Tricep reflexes are 2+ on the right side and 2+ on the left side. Bicep reflexes are 3+ on the right side and 3+ on the left side. Brachioradialis reflexes are 3+ on the right side and 3+ on the left side. Patellar reflexes are 2+ on the right side and 2+ on the left side. Achilles reflexes are 2+ on the right side and 2+ on the left side. Upper extremity and bilateral equal strength and handgrips    Lower leg strength equal and negative foot drop noted   Skin: Skin is warm and dry. Psychiatric: She has a normal mood and affect. Her behavior is normal. Judgment and thought content normal.   Nursing note and vitals reviewed. Assessment:    Patient Active Problem List   Diagnosis    Hypothyroidism    Hypertension    Hyperlipidemia    Bilateral knee pain    Arthritis of both knees    Vitamin D deficiency    Seasonal allergies    Gastroesophageal reflux disease    Arthritis of both knees    Arthritis of both knees    Neuropathy ([address]    Chronic midline low back pain    Lumbosacral spondylosis without myelopathy        Plan:  1. Refill current pain medication as directed  2. Schedule occupational therapy for bilateral hand and wrist pain probable carpal tunnel  3. Continue home exercises and stretches  4. Follow-up in this office    Discussion: Discussed exam findings and plan of care with patient. Patient agreed with POC and questions were asked and answered. I have discussed exam and findings with this patient.  Patient is not interested at this present time with lumbar and/or bilateral knee injections. Patient is interested in bilateral hand pain, she said she was diagnosed with carpal tunnel about a month ago and given wrist braces. Will refer to OT and eval next visit. May seek referral to Ortho. Pt happy with today's visit. Activity: discussed exercise as beneficial to pain reduction, encouraged stretching exercise with a focus on torso strengthening. Education Provided:  [x] Review of Riana Putt [x] Agreement Review [] ORT Score  [] Review of Test  [] Compliance Issues Discussed [] Cognitive Behavior Needs [x] Exercise  [] Financial Issues [x] Teaching  []  Smoking Cessation    Controlled Substance Monitoring:   Discussed with patient possible medication side effects, risk of tolerance, dependence and alternative treatments. Discussed the growing epidemic in the [place] with the overprescribing and at times the abuse of narcotics. Discussed the detrimental effects of long term narcotic use in younger patients. Patient encouraged to set daily goals of exercising and in on narcotics decreasing daily narcotic intake. Discussed with the patient about the development of hyperalgesia with long term narcotic intake.      CC:  PAUL Don, PAUL - CNP, 7/2/2018 at 8:41 AM

## 2018-07-02 NOTE — PROGRESS NOTES
Nursing Admission Record    Current Issues / Falls / ER Visits:  Urgent care visit for sinus issues. Percentage of Pain Relief after Last Procedure:  na %    How long lasted:  na     Radiology exams received during the last 12 months: No       When na                                              Where na       Imaging on chart: Yes         Imaging records requested: No  MRI exams received in the past 2 years:  No  Physical therapy during the last 6 months: Yes       When: Ended in early 2018                                             Where  [address] during the last 12 months: Yes    Education Provided:  [x] Review of Lizette Vivas  [x] Agreement Review  [] Compliance Issues Discussed    [] Cognitive Behavior Needs [x] Exercise [] Review of Test [] Financial Issues  [x] Tobacco/Alcohol Use [x] Teaching [] New Patient [] Picture Obtained    Physician Plan:  [] Outgoing Referral  [] Pharmacy Consult  [] Test Ordered   [] Obtained Test Results / Consult Notes  [] UDS due at next visit, verified per EPIC      [] Suspected Physical Abuse or Suicide Risk assessed - IF YES COMPLETE QUESTIONS BELOW    If any of the following questions are answered yes - contact attending physician for referral:    Has been considering harming self to escape stress, pain problems? [] YES  [] NO  Has a suicide plan? [] YES  [] NO  Has attempted suicide in the past?   [] YES  [] NO  Has a close friend or family member who committed suicide? [] YES  [] NO    Patient Referred To :      Additional Notes:    Assessment Completed by:  Electronically signed by Grace Morel RN on 7/2/2018 at 8:27 AM

## 2018-07-02 NOTE — PROGRESS NOTES
YOB: 1950  Location: [facility]  Location Address: [address] [place] [address], [facility], [address] [place] [address]  Location Phone: [phone]    Chief Complaint   Patient presents with   • Sinus Problem     Chronic sinusitis, unspecified location       History of Present Illness  Camila Wallace is a 68 y.o. female.  Camila Wallace is here for follow up of ENT complaints. The patient has had problems with nasal congestion, sinusitis and allergy  The symptoms are not localized to a particular location. The patient has had moderate symptoms. The symptoms have been present for the last several months The symptoms are aggravated by  no identifiable factors. The symptoms are improved by antibiotics and nasal sprays.  She is doing well with the nasal sprays, however is just recovering from a sinus infection treated with Augmentin.  She is doing better.       Past Medical History:   Diagnosis Date   • Allergic rhinitis    • Chronic sinusitis    • Sommer bullosa    • Disease of thyroid gland    • GERD (gastroesophageal reflux disease)    • Graves disease    • HLD (hyperlipidemia)    • HTN (hypertension)    • Obstructive sleep apnea    • Type 2 diabetes mellitus (CMS/HCC)    • Vasomotor rhinitis        Past Surgical History:   Procedure Laterality Date   • CHOLECYSTECTOMY     • COLONOSCOPY  10/29/2013    diverticulosis   • FOOT SURGERY     • TUBAL ABDOMINAL LIGATION     • TYMPANIC MEMBRANE REPAIR         Outpatient Prescriptions Marked as Taking for the 18 encounter (Office Visit) with TONEY Rodriguez   Medication Sig Dispense Refill   • alendronate (FOSAMAX) 70 MG tablet Take 70 mg by mouth.     • allopurinol (ZYLOPRIM) 100 MG tablet Take 100 mg by mouth.     • atorvastatin (LIPITOR) 40 MG tablet Take 40 mg by mouth Daily.     • Diclofenac Sodium (PENNSAID) 2 % solution Apply 2 Squirts topically 2 (Two) Times a Day.     • fluticasone (FLONASE) 50 MCG/ACT nasal spray 2 sprays into each nostril Daily.     •  gabapentin (NEURONTIN) 300 MG capsule Take 300 mg by mouth.     • glimepiride (AMARYL) 1 MG tablet Take 1 mg by mouth Daily.     • Glucose Blood (BLOOD GLUCOSE TEST) strip Test blood sugar daily and prn     • HYDROcodone-acetaminophen (NORCO)  MG per tablet Take 1 tablet by mouth 3 (Three) Times a Day As Needed.     • [START ON 7/23/2018] HYDROcodone-acetaminophen (NORCO)  MG per tablet Take  by mouth.     • ipratropium (ATROVENT) 0.06 % nasal spray 2 sprays into each nostril 2 (Two) Times a Day As Needed for Rhinitis. 3 each 3   • levothyroxine (SYNTHROID, LEVOTHROID) 100 MCG tablet TAKE 1 TABLET EVERY DAY     • losartan (COZAAR) 50 MG tablet Take  by mouth.     • montelukast (SINGULAIR) 10 MG tablet Take 1 tablet by mouth Daily. 90 tablet 3   • pantoprazole (PROTONIX) 40 MG EC tablet Take 40 mg by mouth Daily.     • risedronate (ACTONEL) 150 MG tablet Take  by mouth.     • topiramate (TOPAMAX) 100 MG tablet Take 100 mg by mouth 2 (Two) Times a Day.     • vitamin D (ERGOCALCIFEROL) 22816 UNITS capsule capsule Take 50,000 Units by mouth 1 (One) Time Per Week.     • [DISCONTINUED] ipratropium (ATROVENT) 0.06 % nasal spray 2 sprays into each nostril 2 (Two) Times a Day As Needed for Rhinitis. 15 mL 11   • [DISCONTINUED] montelukast (SINGULAIR) 10 MG tablet Take 10 mg by mouth.         Adhesive tape and Metformin and related    Family History   Problem Relation Age of Onset   • Diabetes Mother    • Heart disease Mother    • Hypertension Mother    • Hypertension Father    • Diabetes Brother    • Hypertension Brother    • Hyperlipidemia Brother    • Colon polyps Brother    • Hyperlipidemia Maternal Uncle    • Thyroid disease Maternal Uncle    • Cancer Maternal Grandmother    • Colon polyps Daughter        Social History     Social History   • Marital status:      Spouse name: N/A   • Number of children: N/A   • Years of education: N/A     Occupational History   • Not on file.     Social History Main  Topics   • Smoking status: Never Smoker   • Smokeless tobacco: Never Used   • Alcohol use Yes      Comment: occasionally   • Drug use: No   • Sexual activity: Defer     Other Topics Concern   • Not on file     Social History Narrative   • No narrative on file       Review of Systems   Constitutional: Negative.    HENT:        SEE HPI   Eyes: Negative.    Respiratory: Negative.    Cardiovascular: Negative.    Gastrointestinal: Negative.    Endocrine: Negative.    Genitourinary: Negative.    Musculoskeletal: Negative.    Skin: Negative.    Allergic/Immunologic: Positive for environmental allergies.   Neurological: Negative.    Hematological: Negative.    Psychiatric/Behavioral: Negative.        Vitals:    07/02/18 1436   BP: (!) 144/106   Temp: 97.3 °F (36.3 °C)       Body mass index is 30.47 kg/m².    Objective     Physical Exam  CONSTITUTIONAL: well nourished, alert, oriented, in no acute distress     COMMUNICATION AND VOICE: able to communicate normally, normal voice quality    HEAD: normocephalic, no lesions, atraumatic, no tenderness, no masses     FACE: appearance normal, no lesions, no tenderness, no deformities, facial motion symmetric    SALIVARY GLANDS: parotid glands with no tenderness, no swelling, no masses, submandibular glands with normal size, nontender    EYES: ocular motility normal, eyelids normal, orbits normal, no proptosis, conjunctiva normal , pupils equal, round     EARS:  Hearing: response to conversational voice normal bilaterally   External Ears: auricles without lesions  Otoscopic: tympanic membrane appearance normal, no lesions, no perforation, normal mobility, no fluid    NOSE:  External Nose: structure normal, no tenderness on palpation, no nasal discharge, no lesions, no evidence of trauma, nostrils patent   Intranasal Exam: nasal mucosa edema, vestibule within normal limits, inferior turbinate hypertrophy, nasal septum midline     ORAL:  Lips: upper and lower lips without lesion    Teeth: dentition within normal limits for age   Gums: gingivae healthy   Oral Mucosa: oral mucosa normal, no mucosal lesions   Floor of Mouth: Warthin’s duct patent, mucosa normal  Tongue: lingual mucosa normal without lesions, normal tongue mobility   Palate: soft and hard palates with normal mucosa and structure  Oropharynx: oropharyngeal mucosa normal    NECK: neck appearance normal, no mass,  noted without erythema or tenderness    THYROID: no overt thyromegaly, no tenderness, nodules or mass present on palpation, position midline     LYMPH NODES: no lymphadenopathy    CHEST/RESPIRATORY: respiratory effort normal    CARDIOVASCULAR:  extremities without cyanosis or edema      NEUROLOGIC/PSYCHIATRIC: oriented to time, place and person, mood normal, affect appropriate, CN II-XII intact grossly    Assessment/Plan   Camila was seen today for sinus problem.    Diagnoses and all orders for this visit:    Chronic allergic rhinitis due to other allergic trigger, unspecified seasonality    Chronic sinusitis, unspecified location    Other orders  -     ipratropium (ATROVENT) 0.06 % nasal spray; 2 sprays into each nostril 2 (Two) Times a Day As Needed for Rhinitis.  -     montelukast (SINGULAIR) 10 MG tablet; Take 1 tablet by mouth Daily.      * Surgery not found *  No orders of the defined types were placed in this encounter.    Return in about 1 year (around 7/2/2019).       Patient Instructions   Avoidance of allergies discussed.  Use masks when performing yardwork or cleaning activities if indicated.  Continue allergy medications as discussed.    Air purifier as needed.  If on nasal sprays, recommend to use daily as indicated.   Medical vs surgical options discussed.

## 2018-07-10 ENCOUNTER — TELEPHONE (OUTPATIENT)
Dept: GASTROENTEROLOGY | Facility: CLINIC | Age: 68
End: 2018-07-10

## 2018-07-10 ENCOUNTER — HOSPITAL ENCOUNTER (OUTPATIENT)
Facility: HOSPITAL | Age: 68
Setting detail: HOSPITAL OUTPATIENT SURGERY
Discharge: HOME | End: 2018-07-10
Attending: INTERNAL MEDICINE | Admitting: ANESTHESIOLOGY

## 2018-07-10 ENCOUNTER — ANESTHESIA (OUTPATIENT)
Dept: GASTROENTEROLOGY | Facility: HOSPITAL | Age: 68
End: 2018-07-10

## 2018-07-10 ENCOUNTER — ANESTHESIA EVENT (OUTPATIENT)
Dept: GASTROENTEROLOGY | Facility: HOSPITAL | Age: 68
End: 2018-07-10

## 2018-07-10 VITALS
HEIGHT: 63 IN | TEMPERATURE: 97.5 F | HEART RATE: 52 BPM | BODY MASS INDEX: 29.77 KG/M2 | OXYGEN SATURATION: 87 % | SYSTOLIC BLOOD PRESSURE: 117 MMHG | DIASTOLIC BLOOD PRESSURE: 58 MMHG | WEIGHT: 168 LBS | RESPIRATION RATE: 18 BRPM

## 2018-07-10 DIAGNOSIS — Z83.71 FAMILY HISTORY OF COLONIC POLYPS: ICD-10-CM

## 2018-07-10 LAB — GLUCOSE BLDC GLUCOMTR-MCNC: 86 MG/DL (ref 70–130)

## 2018-07-10 PROCEDURE — 88305 TISSUE EXAM BY PATHOLOGIST: CPT | Performed by: INTERNAL MEDICINE

## 2018-07-10 PROCEDURE — 25010000002 PROPOFOL 10 MG/ML EMULSION: Performed by: NURSE ANESTHETIST, CERTIFIED REGISTERED

## 2018-07-10 PROCEDURE — 82962 GLUCOSE BLOOD TEST: CPT

## 2018-07-10 PROCEDURE — 45385 COLONOSCOPY W/LESION REMOVAL: CPT | Performed by: INTERNAL MEDICINE

## 2018-07-10 RX ORDER — SODIUM CHLORIDE 0.9 % (FLUSH) 0.9 %
1-10 SYRINGE (ML) INJECTION AS NEEDED
Status: CANCELLED | OUTPATIENT
Start: 2018-07-10

## 2018-07-10 RX ORDER — SODIUM CHLORIDE 0.9 % (FLUSH) 0.9 %
3 SYRINGE (ML) INJECTION AS NEEDED
Status: DISCONTINUED | OUTPATIENT
Start: 2018-07-10 | End: 2018-07-10 | Stop reason: HOSPADM

## 2018-07-10 RX ORDER — PROPOFOL 10 MG/ML
VIAL (ML) INTRAVENOUS AS NEEDED
Status: DISCONTINUED | OUTPATIENT
Start: 2018-07-10 | End: 2018-07-10 | Stop reason: SURG

## 2018-07-10 RX ORDER — SODIUM CHLORIDE 9 MG/ML
100 INJECTION, SOLUTION INTRAVENOUS CONTINUOUS
Status: CANCELLED | OUTPATIENT
Start: 2018-07-10

## 2018-07-10 RX ORDER — SODIUM CHLORIDE 9 MG/ML
500 INJECTION, SOLUTION INTRAVENOUS CONTINUOUS PRN
Status: DISCONTINUED | OUTPATIENT
Start: 2018-07-10 | End: 2018-07-10 | Stop reason: HOSPADM

## 2018-07-10 RX ADMIN — PROPOFOL 30 MG: 10 INJECTION, EMULSION INTRAVENOUS at 12:16

## 2018-07-10 RX ADMIN — PROPOFOL 50 MG: 10 INJECTION, EMULSION INTRAVENOUS at 12:14

## 2018-07-10 RX ADMIN — SODIUM CHLORIDE 500 ML: 9 INJECTION, SOLUTION INTRAVENOUS at 11:02

## 2018-07-10 RX ADMIN — PROPOFOL 50 MG: 10 INJECTION, EMULSION INTRAVENOUS at 12:12

## 2018-07-10 RX ADMIN — LIDOCAINE HYDROCHLORIDE 0.5 ML: 10 INJECTION, SOLUTION EPIDURAL; INFILTRATION; INTRACAUDAL; PERINEURAL at 11:02

## 2018-07-10 RX ADMIN — PROPOFOL 100 MG: 10 INJECTION, EMULSION INTRAVENOUS at 12:09

## 2018-07-10 NOTE — ANESTHESIA PREPROCEDURE EVALUATION
Anesthesia Evaluation     no history of anesthetic complications:  NPO Solid Status: > 8 hours  NPO Liquid Status: > 2 hours           Airway   Mallampati: I  TM distance: >3 FB  Neck ROM: full  Dental          Pulmonary - normal exam    breath sounds clear to auscultation  (+) sleep apnea on CPAP,   (-) asthma, recent URI, not a smoker  Cardiovascular - normal exam  Exercise tolerance: good (4-7 METS)    Rhythm: regular  Rate: normal    (+) hypertension well controlled, hyperlipidemia,   (-) pacemaker, past MI, angina, cardiac stents, CABG      Neuro/Psych  (-) seizures, TIA, CVA  GI/Hepatic/Renal/Endo    (+) obesity,  GERD well controlled,  renal disease CRI, diabetes mellitus, hyperthyroidism (Graves Disease)  (-) liver disease, hypothyroidism    Musculoskeletal     Abdominal    Substance History      OB/GYN          Other                        Anesthesia Plan    ASA 3     general   total IV anesthesia  intravenous induction   Anesthetic plan and risks discussed with patient.

## 2018-07-10 NOTE — ANESTHESIA POSTPROCEDURE EVALUATION
Patient: Camila Wallace    Procedure Summary     Date:  07/10/18 Room / Location:  [facility] ENDOSCOPY 5 / [facility]    Anesthesia Start:  1201 Anesthesia Stop:  1216    Procedure:  COLONOSCOPY WITH ANESTHESIA (N/A ) Diagnosis:       Family history of colonic polyps      (Family history of colonic polyps [Z83.71])    Surgeon:  Donald Menchaca DO Provider:  Golden Romo CRNA    Anesthesia Type:  general ASA Status:  3          Anesthesia Type: general  Last vitals  BP   134/84 (07/10/18 1041)   Temp   97.5 °F (36.4 °C) (07/10/18 1041)   Pulse   95 (07/10/18 1041)   Resp   20 (07/10/18 1041)     SpO2   99 % (07/10/18 1041)     Post Anesthesia Care and Evaluation    Patient location during evaluation: PHASE II  Patient participation: complete - patient participated  Level of consciousness: awake and alert  Pain score: 0  Pain management: adequate  Airway patency: patent  Anesthetic complications: No anesthetic complications  PONV Status: none  Cardiovascular status: acceptable and stable  Respiratory status: acceptable and unassisted  Hydration status: acceptable

## 2018-07-10 NOTE — H&P
[facility] Gastroenterology  Pre Procedure History & Physical    Chief Complaint:   FHx colon polyps    Subjective     HPI:   FHx colon polyps    Past Medical History:   Past Medical History:   Diagnosis Date   • Allergic rhinitis    • Chronic sinusitis    • Sommer bullosa    • Disease of thyroid gland    • GERD (gastroesophageal reflux disease)    • Graves disease    • HLD (hyperlipidemia)    • HTN (hypertension)    • Obstructive sleep apnea    • Type 2 diabetes mellitus (CMS/HCC)    • Vasomotor rhinitis        Past Surgical History:  Past Surgical History:   Procedure Laterality Date   • CHOLECYSTECTOMY     • COLONOSCOPY  10/29/2013    diverticulosis   • FOOT SURGERY     • TUBAL ABDOMINAL LIGATION     • TYMPANIC MEMBRANE REPAIR         Family History:  Family History   Problem Relation Age of Onset   • Diabetes Mother    • Heart disease Mother    • Hypertension Mother    • Hypertension Father    • Diabetes Brother    • Hypertension Brother    • Hyperlipidemia Brother    • Colon polyps Brother    • Hyperlipidemia Maternal Uncle    • Thyroid disease Maternal Uncle    • Cancer Maternal Grandmother    • Colon polyps Daughter        Social History:   reports that she has never smoked. She has never used smokeless tobacco. She reports that she drinks alcohol. She reports that she does not use drugs.    Medications:   Prior to Admission medications    Medication Sig Start Date End Date Taking? Authorizing Provider   alendronate (FOSAMAX) 70 MG tablet Take 70 mg by mouth. 10/13/17  Yes Nurse [facility], RN   atorvastatin (LIPITOR) 40 MG tablet Take 40 mg by mouth Daily. 5/26/16  Yes Historical Provider, MD   Diclofenac Sodium (PENNSAID) 2 % solution Apply 2 Squirts topically 2 (Two) Times a Day. 8/11/16  Yes Historical Provider, MD   fluticasone (FLONASE) 50 MCG/ACT nasal spray 2 sprays into each nostril Daily. 6/20/16  Yes Historical Provider, MD   gabapentin (NEURONTIN) 300 MG capsule Take 300 mg by mouth. 7/2/18  "9/30/18 Yes Historical Provider, MD   glimepiride (AMARYL) 1 MG tablet Take 1 mg by mouth Daily. 6/20/16  Yes Historical Provider, MD   Glucose Blood (BLOOD GLUCOSE TEST) strip Test blood sugar daily and prn 8/10/17  Yes Nurse [facility], RN   HYDROcodone-acetaminophen (NORCO)  MG per tablet Take  by mouth. 7/23/18 8/22/18 Yes Historical Provider, MD   ipratropium (ATROVENT) 0.06 % nasal spray 2 sprays into each nostril 2 (Two) Times a Day As Needed for Rhinitis. 7/2/18  Yes TONEY Rodriguez   levothyroxine (SYNTHROID, LEVOTHROID) 100 MCG tablet TAKE 1 TABLET EVERY DAY 12/1/17  Yes Nurse [facility], RN   losartan (COZAAR) 50 MG tablet Take  by mouth. 6/27/18  Yes Historical Provider, MD   montelukast (SINGULAIR) 10 MG tablet Take 1 tablet by mouth Daily. 7/2/18  Yes TONEY Rodriguez   pantoprazole (PROTONIX) 40 MG EC tablet Take 40 mg by mouth Daily. 6/20/16  Yes Historical Provider, MD   risedronate (ACTONEL) 150 MG tablet Take  by mouth. 9/27/17  Yes Nurse [facility], RN   topiramate (TOPAMAX) 100 MG tablet Take 100 mg by mouth 2 (Two) Times a Day. 6/20/16  Yes Historical Provider, MD   vitamin D (ERGOCALCIFEROL) 13616 UNITS capsule capsule Take 50,000 Units by mouth 1 (One) Time Per Week. 6/20/16   Historical Provider, MD   allopurinol (ZYLOPRIM) 100 MG tablet Take 100 mg by mouth. 4/2/18 7/10/18  Nurse [facility], RN   HYDROcodone-acetaminophen (NORCO)  MG per tablet Take 1 tablet by mouth 3 (Three) Times a Day As Needed. 9/21/16 7/10/18  Historical Provider, MD       Allergies:  Adhesive tape and Metformin and related    ROS:    General: Weight stable  Resp: No SOA  Cardiovascular: No CP    Objective     Blood pressure 134/84, pulse 95, temperature 97.5 °F (36.4 °C), temperature source Temporal Artery , resp. rate 20, height 160 cm (63\"), weight 76.2 kg (168 lb), SpO2 99 %, not currently breastfeeding.    Physical Exam   Constitutional: Pt is oriented to person, place, and in no " distress.   HENT: Mouth/Throat: Oropharynx is clear.   Cardiovascular: Normal rate, regular rhythm.    Pulmonary/Chest: Effort normal. No respiratory distress. No  wheezes.   Abdominal: Soft. Non-distended.  Skin: Skin is warm and dry.   Psychiatric: Mood, memory, affect and judgment appear normal.     Assessment/Plan     Diagnosis:  FHx colon polyps    Anticipated Surgical Procedure:  C-scope    The risks, benefits, and alternatives of this procedure have been discussed with the patient or the responsible party- the patient understands and agrees to proceed.

## 2018-07-13 RX ORDER — ATORVASTATIN CALCIUM 40 MG/1
TABLET, FILM COATED ORAL
Qty: 90 TABLET | Refills: 3 | Status: SHIPPED | OUTPATIENT
Start: 2018-07-13

## 2018-07-16 ENCOUNTER — HOSPITAL ENCOUNTER (OUTPATIENT)
Dept: OCCUPATIONAL THERAPY | Age: 68
Setting detail: THERAPIES SERIES
Discharge: HOME | End: 2018-07-16
Payer: MEDICARE

## 2018-07-16 PROCEDURE — 97165 OT EVAL LOW COMPLEX 30 MIN: CPT

## 2018-07-16 PROCEDURE — 97530 THERAPEUTIC ACTIVITIES: CPT

## 2018-07-16 PROCEDURE — G8988 SELF CARE GOAL STATUS: HCPCS

## 2018-07-16 PROCEDURE — G8987 SELF CARE CURRENT STATUS: HCPCS

## 2018-07-16 NOTE — PROGRESS NOTES
Occupational Therapy  Facility/Department: [facility] OCCUPATIONAL THERAPY  Daily Treatment Note  NAME: Amelia Henriquez  : 1950  MRN: [id number]    Date of Service: 2018    Discharge Recommendations:  Home independently       Patient Diagnosis(es): There were no encounter diagnoses. has a past medical history of Arthritis; Cataract of both eyes; Chronic back pain; Hyperlipidemia; Hypertension; Hypothyroidism; Lupus (systemic lupus erythematosus) ([facility] [address] Neuropathy ([facility] [address] Sickle cell anemia (HCC); and Type II or unspecified type diabetes mellitus without mention of complication, not stated as uncontrolled. has a past surgical history that includes Cholecystectomy; Tympanoplasty; back surgery; Foot surgery (Left, 2014); Tubal ligation; and Colonoscopy. Restrictions     Subjective   General  Chart Reviewed: Yes  Referring Practitioner: Dr. Esther Shea   Diagnosis: Pain Management B wrists   Subjective  Subjective: P pleasant and cooperative throughout visit  Pre Treatment Pain Screening  Comments / Details: encouraged pt to puchase wrist splins        Objective    ADL  Grooming: Independent  UE Bathing: Independent  LE Bathing: Independent  UE Dressing: Independent  LE Dressing: Independent  Toileting: Independent  Additional Comments: I with all [facility] asks for ADL tasks        Balance  Sitting Balance: Independent  Standing Balance: Independent         Cognition  Overall Cognitive Status: WNL  Functional Activity Tolerance  Functional Activity Tolerance: Tolerates 30 min exercise with multiple rests        Upper Extremity Function  UE AROM: Pt educated and completed Ulnar and Median Nerve glide exercsises 10 reps x 4 sets each. pt given Handout for Home.   Pt will require further education and follow up            Left Hand Strength -  (lbs)  Handle Setting 1: 42  Right Hand Strength -  (lbs)  Handle Setting 1: 40           Assessment   Performance deficits / Impairments: Decreased

## 2018-07-18 LAB
CYTO UR: NORMAL
LAB AP CASE REPORT: NORMAL
LAB AP CLINICAL INFORMATION: NORMAL
PATH REPORT.FINAL DX SPEC: NORMAL
PATH REPORT.GROSS SPEC: NORMAL

## 2018-07-20 ENCOUNTER — HOSPITAL ENCOUNTER (OUTPATIENT)
Dept: OCCUPATIONAL THERAPY | Age: 68
Setting detail: THERAPIES SERIES
Discharge: HOME | End: 2018-07-20
Payer: MEDICARE

## 2018-07-20 PROCEDURE — G8987 SELF CARE CURRENT STATUS: HCPCS

## 2018-07-20 PROCEDURE — 97530 THERAPEUTIC ACTIVITIES: CPT

## 2018-07-20 PROCEDURE — 97535 SELF CARE MNGMENT TRAINING: CPT

## 2018-07-20 PROCEDURE — G8988 SELF CARE GOAL STATUS: HCPCS

## 2018-07-23 ENCOUNTER — TELEPHONE (OUTPATIENT)
Dept: PRIMARY CARE CLINIC | Age: 68
End: 2018-07-23

## 2018-07-24 ENCOUNTER — OFFICE VISIT (OUTPATIENT)
Dept: PRIMARY CARE CLINIC | Age: 68
End: 2018-07-24
Payer: MEDICARE

## 2018-07-24 VITALS
OXYGEN SATURATION: 99 % | WEIGHT: 173.8 LBS | SYSTOLIC BLOOD PRESSURE: 136 MMHG | HEIGHT: 63 IN | DIASTOLIC BLOOD PRESSURE: 82 MMHG | HEART RATE: 86 BPM | BODY MASS INDEX: 30.79 KG/M2 | TEMPERATURE: 97.8 F

## 2018-07-24 DIAGNOSIS — R00.2 HEART PALPITATIONS: ICD-10-CM

## 2018-07-24 DIAGNOSIS — R00.0 TACHYCARDIA: ICD-10-CM

## 2018-07-24 DIAGNOSIS — E03.9 HYPOTHYROIDISM, UNSPECIFIED TYPE: ICD-10-CM

## 2018-07-24 DIAGNOSIS — I10 ESSENTIAL HYPERTENSION: ICD-10-CM

## 2018-07-24 DIAGNOSIS — R00.2 HEART PALPITATIONS: Primary | ICD-10-CM

## 2018-07-24 LAB
ALBUMIN SERPL-MCNC: 4.1 G/DL (ref 3.5–5.2)
ALP BLD-CCNC: 89 U/L (ref 35–104)
ALT SERPL-CCNC: 22 U/L (ref 5–33)
ANION GAP SERPL CALCULATED.3IONS-SCNC: 13 MMOL/L (ref 7–19)
AST SERPL-CCNC: 24 U/L (ref 5–32)
BILIRUB SERPL-MCNC: 0.4 MG/DL (ref 0.2–1.2)
BUN BLDV-MCNC: 22 MG/DL (ref 8–23)
CALCIUM SERPL-MCNC: 9.4 MG/DL (ref 8.8–10.2)
CHLORIDE BLD-SCNC: 101 MMOL/L (ref 98–111)
CO2: 27 MMOL/L (ref 22–29)
CREAT SERPL-MCNC: 1.2 MG/DL (ref 0.5–0.9)
GFR NON-AFRICAN AMERICAN: 45
GLUCOSE BLD-MCNC: 99 MG/DL (ref 74–109)
HCT VFR BLD CALC: 34.1 % (ref 37–47)
HEMOGLOBIN: 11 G/DL (ref 12–16)
MAGNESIUM: 2.2 MG/DL (ref 1.6–2.4)
MCH RBC QN AUTO: 28.9 PG (ref 27–31)
MCHC RBC AUTO-ENTMCNC: 32.3 G/DL (ref 33–37)
MCV RBC AUTO: 89.7 FL (ref 81–99)
PDW BLD-RTO: 14.6 % (ref 11.5–14.5)
PLATELET # BLD: 338 K/UL (ref 130–400)
PMV BLD AUTO: 10.2 FL (ref 9.4–12.3)
POTASSIUM SERPL-SCNC: 4.2 MMOL/L (ref 3.5–5)
RBC # BLD: 3.8 M/UL (ref 4.2–5.4)
SODIUM BLD-SCNC: 141 MMOL/L (ref 136–145)
TOTAL PROTEIN: 7.4 G/DL (ref 6.6–8.7)
TSH SERPL DL<=0.05 MIU/L-ACNC: 8.04 UIU/ML (ref 0.27–4.2)
WBC # BLD: 6.6 K/UL (ref 4.8–10.8)

## 2018-07-24 PROCEDURE — G8417 CALC BMI ABV UP PARAM F/U: HCPCS | Performed by: NURSE PRACTITIONER

## 2018-07-24 PROCEDURE — 1101F PT FALLS ASSESS-DOCD LE1/YR: CPT | Performed by: NURSE PRACTITIONER

## 2018-07-24 PROCEDURE — G8427 DOCREV CUR MEDS BY ELIG CLIN: HCPCS | Performed by: NURSE PRACTITIONER

## 2018-07-24 PROCEDURE — 1090F PRES/ABSN URINE INCON ASSESS: CPT | Performed by: NURSE PRACTITIONER

## 2018-07-24 PROCEDURE — 4040F PNEUMOC VAC/ADMIN/RCVD: CPT | Performed by: NURSE PRACTITIONER

## 2018-07-24 PROCEDURE — 1123F ACP DISCUSS/DSCN MKR DOCD: CPT | Performed by: NURSE PRACTITIONER

## 2018-07-24 PROCEDURE — 99214 OFFICE O/P EST MOD 30 MIN: CPT | Performed by: NURSE PRACTITIONER

## 2018-07-24 PROCEDURE — 1036F TOBACCO NON-USER: CPT | Performed by: NURSE PRACTITIONER

## 2018-07-24 PROCEDURE — 3017F COLORECTAL CA SCREEN DOC REV: CPT | Performed by: NURSE PRACTITIONER

## 2018-07-24 PROCEDURE — G8399 PT W/DXA RESULTS DOCUMENT: HCPCS | Performed by: NURSE PRACTITIONER

## 2018-07-24 RX ORDER — METOPROLOL SUCCINATE 25 MG/1
25 TABLET, EXTENDED RELEASE ORAL DAILY
Qty: 90 TABLET | Refills: 1 | Status: SHIPPED | OUTPATIENT
Start: 2018-07-24

## 2018-07-24 RX ORDER — METOPROLOL SUCCINATE 25 MG/1
25 TABLET, EXTENDED RELEASE ORAL DAILY
Qty: 30 TABLET | Refills: 0 | Status: SHIPPED
Start: 2018-07-24 | End: 2020-07-25 | Stop reason: CLARIF

## 2018-07-24 ASSESSMENT — ENCOUNTER SYMPTOMS
RESPIRATORY NEGATIVE: 1
SHORTNESS OF BREATH: 0
EYES NEGATIVE: 1
CHEST TIGHTNESS: 0
GASTROINTESTINAL NEGATIVE: 1

## 2018-07-24 NOTE — PROGRESS NOTES
[address] [facility]  [address] [facility]  [address] [place] [address]  Dept: [phone]  Dept Fax: [phone]  Loc: [phone]    Corrie Ravi is a 76 y.o. female who presents today for her medical conditions/complaints as noted below. Corrie Ravi is c/o of Tachycardia (requesting referral)      Chief Complaint   Patient presents with    Tachycardia     requesting referral       HPI:     HPI  Patient here with complaints of tachycardia. She reports that at home she has been noting her pulse rate resting at 90-110s. She is requesting referral to cardiology. Patient currently is not on BB at this time.  Patient reports that she has had a hx of palpitations       Past Medical History:   Diagnosis Date    Arthritis     Cataract of both eyes     Chronic back pain     Hyperlipidemia     Hypertension     Hypothyroidism     Lupus ([facility]ic lupus erythematosus) ([facility] [address]     Neuropathy ([facility] [address] 12/22/2017    Sickle cell anemia (HCC)     carrier    Type II or unspecified type diabetes mellitus without mention of complication, not stated as uncontrolled         Past Surgical History:   Procedure Laterality Date    BACK SURGERY      lumbar    CHOLECYSTECTOMY      COLONOSCOPY      FOOT SURGERY Left 1/2014    bunion and hammer toe    TUBAL LIGATION      TYMPANOPLASTY         Social History   Substance Use Topics    Smoking status: Never Smoker    Smokeless tobacco: Never Used    Alcohol use Yes      Comment: occasionally        Current Outpatient Prescriptions   Medication Sig Dispense Refill    metoprolol succinate (TOPROL XL) 25 MG extended release tablet Take 1 tablet by mouth daily 30 tablet 0    metoprolol succinate (TOPROL XL) 25 MG extended release tablet Take 1 tablet by mouth daily 90 tablet 1    atorvastatin (LIPITOR) 40 MG tablet TAKE 1 TABLET EVERY NIGHT 90 tablet 3    hydrochlorothiazide (MICROZIDE) 12.5 MG capsule Take 1 capsule by mouth daily  3  losartan (COZAAR) 50 MG tablet Take 2 tablets by mouth daily  3    HYDROcodone-acetaminophen (NORCO)  MG per tablet Take 1 tablet by mouth every 8 hours as needed for Pain (may fill 7/23/18) for up to 30 days. . 90 tablet 0    gabapentin (NEURONTIN) 300 MG capsule Take 1 capsule by mouth 2 times daily for 90 days. 3 month supply. 180 capsule 0    alendronate (FOSAMAX) 70 MG tablet Take 70 mg by mouth      Misc. Devices (WRIST BRACE) MISC Bilateral carpal tunnel brace 2 each 0    allopurinol (ZYLOPRIM) 100 MG tablet Take 1 tablet by mouth daily 90 tablet 3    vitamin D (ERGOCALCIFEROL) 91845 units CAPS capsule Take 1 capsule by mouth once a week 12 capsule 3    levothyroxine (SYNTHROID) 100 MCG tablet TAKE 1 TABLET EVERY DAY 90 tablet 3    glimepiride (AMARYL) 1 MG tablet TAKE 1 TABLET EVERY MORNING BEFORE BREAKFAST 90 tablet 3    Glucose Blood (BLOOD GLUCOSE TEST STRIPS) STRP Test blood sugar daily and prn 100 strip 1    fluticasone (FLONASE) 50 MCG/ACT nasal spray USE 2 SPRAYS NASALLY EVERY DAY 48 g 1    azelastine (ASTELIN) 0.1 % nasal spray 2 sprays by Nasal route daily  3    pantoprazole (PROTONIX) 40 MG tablet Take 1 tablet by mouth daily 90 tablet 3    topiramate (TOPAMAX) 100 MG tablet Take 1 tablet by mouth 2 times daily 180 tablet 0     No current facility-administered medications for this visit. Allergies   Allergen Reactions    Glucophage [Metformin] Swelling    Metformin And Related        Family History   Problem Relation Age of Onset    High Cholesterol Sister     High Cholesterol Brother     Sickle Cell Anemia Daughter     Sickle Cell Anemia Son            Subjective:      Review of Systems   Constitutional: Negative. HENT: Negative. Eyes: Negative. Respiratory: Negative. Negative for chest tightness and shortness of breath. Cardiovascular: Positive for palpitations. Negative for chest pain. Gastrointestinal: Negative. Endocrine: Negative. Comprehensive Metabolic Panel    CBC   2. Tachycardia     3. Essential hypertension     4. Hypothyroidism, unspecified type         No results found for this visit on 07/24/18. Plan:     I am checking labs - we will call patient with results. I am starting patient on toprol xl due to tachycardia. If symptoms continue will refer per patient request.     Return in about 4 weeks (around 8/21/2018) for heart palpitations and med follow up. Orders Placed This Encounter   Procedures    TSH without Reflex     Standing Status:   Future     Standing Expiration Date:   7/24/2019    Magnesium     Standing Status:   Future     Standing Expiration Date:   7/24/2019    Comprehensive Metabolic Panel     Standing Status:   Future     Standing Expiration Date:   7/24/2019    CBC     Standing Status:   Future     Standing Expiration Date:   7/24/2019       Orders Placed This Encounter   Medications    metoprolol succinate (TOPROL XL) 25 MG extended release tablet     Sig: Take 1 tablet by mouth daily     Dispense:  30 tablet     Refill:  0    metoprolol succinate (TOPROL XL) 25 MG extended release tablet     Sig: Take 1 tablet by mouth daily     Dispense:  90 tablet     Refill:  1        Patient given educational materials - see patient instructions. Discussed use, benefit, and side effects of prescribed medications. All patient questions answered. Pt voiced understanding. Reviewed health maintenance. Instructed to continue current medications, diet and exercise. Patient agreed with treatment plan. Follow up as directed.            Electronically signed by PAUL Donato on 7/24/2018 at 7:20 AM

## 2018-07-26 ENCOUNTER — TELEPHONE (OUTPATIENT)
Dept: PRIMARY CARE CLINIC | Age: 68
End: 2018-07-26

## 2018-07-26 DIAGNOSIS — E03.9 HYPOTHYROIDISM, UNSPECIFIED TYPE: Primary | ICD-10-CM

## 2018-07-26 RX ORDER — LEVOTHYROXINE SODIUM 0.12 MG/1
125 TABLET ORAL DAILY
Qty: 90 TABLET | Refills: 1 | Status: SHIPPED | OUTPATIENT
Start: 2018-07-26

## 2018-08-13 ENCOUNTER — HOSPITAL ENCOUNTER (EMERGENCY)
Age: 68
Discharge: LEFT BEFORE DISCHARGE | End: 2018-08-13
Payer: MEDICARE

## 2018-08-13 VITALS
BODY MASS INDEX: 31.01 KG/M2 | OXYGEN SATURATION: 100 % | RESPIRATION RATE: 18 BRPM | DIASTOLIC BLOOD PRESSURE: 90 MMHG | SYSTOLIC BLOOD PRESSURE: 145 MMHG | TEMPERATURE: 98.1 F | HEIGHT: 63 IN | WEIGHT: 175 LBS | HEART RATE: 92 BPM

## 2018-08-13 PROCEDURE — 4500000002 HC ER NO CHARGE

## 2018-09-24 DIAGNOSIS — E11.9 TYPE 2 DIABETES MELLITUS WITHOUT COMPLICATION, WITHOUT LONG-TERM CURRENT USE OF INSULIN (HCC): ICD-10-CM

## 2018-10-08 RX ORDER — GLIMEPIRIDE 1 MG/1
TABLET ORAL
Qty: 90 TABLET | Refills: 3 | Status: SHIPPED | OUTPATIENT
Start: 2018-10-08

## 2018-11-12 PROBLEM — E11.9 TYPE 2 DIABETES MELLITUS WITHOUT COMPLICATION, WITHOUT LONG-TERM CURRENT USE OF INSULIN (HCC): Status: ACTIVE | Noted: 2018-11-12

## 2019-04-08 RX ORDER — IPRATROPIUM BROMIDE 42 UG/1
SPRAY, METERED NASAL
Qty: 45 ML | Refills: 3 | Status: SHIPPED | OUTPATIENT
Start: 2019-04-08 | End: 2019-07-23 | Stop reason: SDUPTHER

## 2019-04-09 PROCEDURE — 87081 CULTURE SCREEN ONLY: CPT | Performed by: FAMILY MEDICINE

## 2019-04-16 ENCOUNTER — OFFICE VISIT (OUTPATIENT)
Dept: RETAIL CLINIC | Facility: CLINIC | Age: 69
End: 2019-04-16

## 2019-04-16 VITALS
SYSTOLIC BLOOD PRESSURE: 128 MMHG | OXYGEN SATURATION: 99 % | TEMPERATURE: 97.8 F | DIASTOLIC BLOOD PRESSURE: 82 MMHG | HEART RATE: 78 BPM | RESPIRATION RATE: 16 BRPM

## 2019-04-16 DIAGNOSIS — M54.41 CHRONIC RIGHT-SIDED LOW BACK PAIN WITH RIGHT-SIDED SCIATICA: Primary | ICD-10-CM

## 2019-04-16 DIAGNOSIS — G89.29 CHRONIC RIGHT-SIDED LOW BACK PAIN WITH RIGHT-SIDED SCIATICA: Primary | ICD-10-CM

## 2019-04-16 PROCEDURE — 96372 THER/PROPH/DIAG INJ SC/IM: CPT | Performed by: NURSE PRACTITIONER

## 2019-04-16 PROCEDURE — 99213 OFFICE O/P EST LOW 20 MIN: CPT | Performed by: NURSE PRACTITIONER

## 2019-04-16 RX ORDER — METHYLPREDNISOLONE 4 MG/1
TABLET ORAL
Qty: 21 TABLET | Refills: 0 | Status: SHIPPED | OUTPATIENT
Start: 2019-04-16 | End: 2019-06-24

## 2019-04-16 RX ORDER — DEXAMETHASONE SODIUM PHOSPHATE 4 MG/ML
8 INJECTION, SOLUTION INTRA-ARTICULAR; INTRALESIONAL; INTRAMUSCULAR; INTRAVENOUS; SOFT TISSUE ONCE
Status: COMPLETED | OUTPATIENT
Start: 2019-04-16 | End: 2019-04-16

## 2019-04-16 RX ADMIN — DEXAMETHASONE SODIUM PHOSPHATE 8 MG: 4 INJECTION, SOLUTION INTRA-ARTICULAR; INTRALESIONAL; INTRAMUSCULAR; INTRAVENOUS; SOFT TISSUE at 16:37

## 2019-04-16 NOTE — PROGRESS NOTES
Chief Complaint   Patient presents with   • Hip Pain     Subjective   Camila Wallace is a 69 y.o. female who presents to the clinic today with complaints right hip pain. She does not point to her hip she points to her groin and reports radiates from the back. At night she has some numbness in the right lateral thigh which is positional. She has had Dr. Lima discussed surgical treatment, but she declines.  He told her to come back if it gets worse and they will discuss her options again. She follows [facility]. She was not able to get in today. She usually gets steroid injection IM and steroid pack. She reports steroid minimally affects her blood sugar. She says it usually goes up to 140-160. She checks it twice a day.   She would also like for me to check her Uvula since she is getting treatment for Uvulitis. She reports feels much better and can swallow now.   Hip Pain    The incident occurred more than 1 week ago. The incident occurred at home. There was no injury mechanism. The pain is present in the right hip. The quality of the pain is described as aching. The pain is moderate. The pain has been fluctuating since onset. Associated symptoms include numbness (only at night ). Pertinent negatives include no inability to bear weight, loss of motion, loss of sensation, muscle weakness or tingling. She reports no foreign bodies present. The symptoms are aggravated by movement. She has tried acetaminophen and NSAIDs for the symptoms. The treatment provided mild relief.         Current Outpatient Medications:   •  alendronate (FOSAMAX) 70 MG tablet, Take 70 mg by mouth., Disp: , Rfl:   •  amoxicillin-clavulanate (AUGMENTIN) 875-125 MG per tablet, Take 1 tablet by mouth Every 12 (Twelve) Hours for 10 days., Disp: 20 tablet, Rfl: 0  •  atorvastatin (LIPITOR) 40 MG tablet, Take 40 mg by mouth Daily., Disp: , Rfl:   •  Diclofenac Sodium (PENNSAID) 2 % solution, Apply 2 Squirts topically 2 (Two) Times a Day., Disp: , Rfl:    •  glimepiride (AMARYL) 1 MG tablet, Take 1 mg by mouth Daily., Disp: , Rfl:   •  Glucose Blood (BLOOD GLUCOSE TEST) strip, Test blood sugar daily and prn, Disp: , Rfl:   •  HYDROcodone-acetaminophen (NORCO)  MG per tablet, TAKE 1 TABLET BY MOUTH THREE TIMES A DAY  EFFECTIVE DATE 3 21 19, Disp: , Rfl: 0  •  ipratropium (ATROVENT) 0.06 % nasal spray, USE 2 SPRAYS IN EACH NOSTRIL TWICE DAILY AS NEEDED FOR RHINITIS , Disp: 45 mL, Rfl: 3  •  levothyroxine (SYNTHROID, LEVOTHROID) 100 MCG tablet, TAKE 1 TABLET EVERY DAY, Disp: , Rfl:   •  losartan (COZAAR) 50 MG tablet, Take  by mouth., Disp: , Rfl:   •  pantoprazole (PROTONIX) 40 MG EC tablet, Take 40 mg by mouth Daily., Disp: , Rfl:   •  risedronate (ACTONEL) 150 MG tablet, Take  by mouth., Disp: , Rfl:   •  topiramate (TOPAMAX) 100 MG tablet, Take 100 mg by mouth 2 (Two) Times a Day., Disp: , Rfl:   •  vitamin D (ERGOCALCIFEROL) 41628 UNITS capsule capsule, Take 50,000 Units by mouth 1 (One) Time Per Week., Disp: , Rfl:   •  methylPREDNISolone (MEDROL, CLINTON,) 4 MG tablet, Take as directed. Start in am. Monitor blood sugar bid while taking, Disp: 21 tablet, Rfl: 0  No current facility-administered medications for this visit.     Allergies:  Adhesive tape and Metformin and related    Past Medical History:   Diagnosis Date   • Allergic rhinitis    • Chronic sinusitis    • Sommer bullosa    • Disease of thyroid gland    • GERD (gastroesophageal reflux disease)    • Graves disease    • HLD (hyperlipidemia)    • HTN (hypertension)    • Obstructive sleep apnea    • Type 2 diabetes mellitus (CMS/HCC)    • Vasomotor rhinitis      Past Surgical History:   Procedure Laterality Date   • CHOLECYSTECTOMY     • COLONOSCOPY  10/29/2013    diverticulosis   • COLONOSCOPY N/A 7/10/2018    Procedure: COLONOSCOPY WITH ANESTHESIA;  Surgeon: Donald Menchaca DO;  Location: [facility] ENDOSCOPY;  Service: Gastroenterology   • FOOT SURGERY     • TUBAL ABDOMINAL LIGATION     • TYMPANIC  MEMBRANE REPAIR       Family History   Problem Relation Age of Onset   • Diabetes Mother    • Heart disease Mother    • Hypertension Mother    • Hypertension Father    • Diabetes Brother    • Hypertension Brother    • Hyperlipidemia Brother    • Colon polyps Brother    • Hyperlipidemia Maternal Uncle    • Thyroid disease Maternal Uncle    • Cancer Maternal Grandmother    • Colon polyps Daughter      Social History     Tobacco Use   • Smoking status: Never Smoker   • Smokeless tobacco: Never Used   Substance Use Topics   • Alcohol use: Yes     Comment: occasionally   • Drug use: No       Review of Systems  Review of Systems   Constitutional: Negative.  Negative for activity change and fever.   Respiratory: Negative for shortness of breath.    Cardiovascular: Negative for chest pain and leg swelling.   Musculoskeletal: Negative for arthralgias and joint swelling.   Neurological: Positive for numbness (only at night ). Negative for tingling and weakness.   Psychiatric/Behavioral: Negative for behavioral problems.       Objective   /82 (BP Location: Left arm, Patient Position: Sitting, Cuff Size: Adult)   Pulse 78   Temp 97.8 °F (36.6 °C) (Tympanic)   Resp 16   SpO2 99%       Physical Exam   Constitutional: She appears well-developed and well-nourished.   HENT:   Head: Normocephalic and atraumatic.   Mouth/Throat: Uvula is midline, oropharynx is clear and moist and mucous membranes are normal. No uvula swelling.   Eyes: Pupils are equal, round, and reactive to light.   Neck: Normal range of motion. Neck supple.   Cardiovascular: Normal rate, regular rhythm and normal heart sounds.   Pulmonary/Chest: Effort normal and breath sounds normal.   Musculoskeletal:        Lumbar back: She exhibits decreased range of motion, tenderness (bilateral paraspinals and right SI joint ) and pain. She exhibits no bony tenderness, no swelling, no edema, no deformity, no laceration, no spasm and normal pulse.   Neurological: She  has normal strength. No sensory deficit. She displays a negative Romberg sign.   Reflex Scores:       Patellar reflexes are 1+ on the right side and 1+ on the left side.       Achilles reflexes are 1+ on the right side and 1+ on the left side.  Skin: Skin is warm and dry.   Psychiatric: She has a normal mood and affect.   Vitals reviewed.      Assessment/Plan     Camila was seen today for hip pain.    Diagnoses and all orders for this visit:    Chronic right-sided low back pain with right-sided sciatica  -     dexamethasone (DECADRON) injection 8 mg    Other orders  -     methylPREDNISolone (MEDROL, CLINTON,) 4 MG tablet; Take as directed. Start in am. Monitor blood sugar bid while taking    Uvulitis is improving.   Follow up with Dr. REGGIE DHALIWAL and his PAC.   If symptoms worsen go to walk-in out patient clinic with [facility].

## 2019-04-16 NOTE — PATIENT INSTRUCTIONS
Follow up with Dr. Clarence DHALIWAL and his PAC.   If symptoms worsen go to walk-in out patient clinic with [facility].       Lumbosacral Radiculopathy  Lumbosacral radiculopathy is a condition that involves the spinal nerves and nerve roots in the low back and bottom of the spine. The condition develops when these nerves and nerve roots move out of place or become inflamed and cause symptoms.  What are the causes?  This condition may be caused by:  · Pressure from a disk that bulges out of place (herniated disk). A disk is a plate of cartilage that separates bones in the spine.  · Disk degeneration.  · A narrowing of the bones of the lower back (spinal stenosis).  · A tumor.  · An infection.  · An injury that places sudden pressure on the disks that cushion the bones of your lower spine.    What increases the risk?  This condition is more likely to develop in:  · Males aged 30-50 years.  · Females aged 50-60 years.  · People who lift improperly.  · People who are overweight or live a sedentary lifestyle.  · People who smoke.  · People who perform repetitive activities that strain the spine.    What are the signs or symptoms?  Symptoms of this condition include:  · Pain that goes down from the back into the legs (sciatica). This is the most common symptom. The pain may be worse with sitting, coughing, or sneezing.  · Pain and numbness in the arms and legs.  · Muscle weakness.  · Tingling.  · Loss of bladder control or bowel control.    How is this diagnosed?  This condition is diagnosed with a physical exam and medical history. If the pain is lasting, you may have tests, such as:  · MRI scan.  · X-ray.  · CT scan.  · Myelogram.  · Nerve conduction study.    How is this treated?  This condition is often treated with:  · Hot packs and ice applied to affected areas.  · Stretches to improve flexibility.  · Exercises to strengthen back muscles.  · Physical therapy.  · Pain medicine.  · A steroid injection in the spine.    In  some cases, no treatment is needed. If the condition is long-lasting (chronic), or if symptoms are severe, treatment may involve surgery or lifestyle changes, such as following a weight loss plan.  Follow these instructions at home:  Medicines  · Take medicines only as directed by your health care provider.  · Do not drive or operate heavy machinery while taking pain medicine.  Injury care  · Apply a heat pack to the injured area as directed by your health care provider.  · Apply ice to the affected area:  ? Put ice in a plastic bag.  ? Place a towel between your skin and the bag.  ? Leave the ice on for 20-30 minutes, every 2 hours while you are awake or as needed. Or, leave the ice on for as long as directed by your health care provider.  Other Instructions  · If you were shown how to do any exercises or stretches, do them as directed by your health care provider.  · If your health care provider prescribed a diet or exercise program, follow it as directed.  · Keep all follow-up visits as directed by your health care provider. This is important.  Contact a health care provider if:  · Your pain does not improve over time even when taking pain medicines.  Get help right away if:  · Your develop severe pain.  · Your pain suddenly gets worse.  · You develop increasing weakness in your legs.  · You lose the ability to control your bladder or bowel.  · You have difficulty walking or balancing.  · You have a fever.  This information is not intended to replace advice given to you by your health care provider. Make sure you discuss any questions you have with your health care provider.  Document Released: 12/18/2006 Document Revised: 05/25/2017 Document Reviewed: 12/14/2015  [employer] Patient Education © 2019 [employer]

## 2019-04-22 ENCOUNTER — HOSPITAL ENCOUNTER (EMERGENCY)
Age: 69
Discharge: HOME | End: 2019-04-22
Payer: MEDICARE

## 2019-04-22 VITALS
HEIGHT: 63 IN | WEIGHT: 188 LBS | RESPIRATION RATE: 17 BRPM | DIASTOLIC BLOOD PRESSURE: 93 MMHG | SYSTOLIC BLOOD PRESSURE: 133 MMHG | BODY MASS INDEX: 33.31 KG/M2 | OXYGEN SATURATION: 96 % | HEART RATE: 106 BPM | TEMPERATURE: 99.4 F

## 2019-04-22 DIAGNOSIS — J01.10 ACUTE NON-RECURRENT FRONTAL SINUSITIS: Primary | ICD-10-CM

## 2019-04-22 LAB
RAPID INFLUENZA  B AGN: NEGATIVE
RAPID INFLUENZA A AGN: NEGATIVE

## 2019-04-22 PROCEDURE — 99283 EMERGENCY DEPT VISIT LOW MDM: CPT | Performed by: NURSE PRACTITIONER

## 2019-04-22 PROCEDURE — 87804 INFLUENZA ASSAY W/OPTIC: CPT

## 2019-04-22 PROCEDURE — 99283 EMERGENCY DEPT VISIT LOW MDM: CPT

## 2019-04-22 RX ORDER — CEFDINIR 300 MG/1
300 CAPSULE ORAL 2 TIMES DAILY
Qty: 20 CAPSULE | Refills: 0 | Status: SHIPPED | OUTPATIENT
Start: 2019-04-22 | End: 2019-05-02

## 2019-04-22 RX ORDER — FLUCONAZOLE 100 MG/1
100 TABLET ORAL DAILY
Qty: 3 TABLET | Refills: 0 | Status: SHIPPED | OUTPATIENT
Start: 2019-04-22 | End: 2019-04-25

## 2019-04-22 ASSESSMENT — ENCOUNTER SYMPTOMS
TROUBLE SWALLOWING: 0
VOMITING: 0
ABDOMINAL PAIN: 0
BACK PAIN: 0
ABDOMINAL DISTENTION: 0
EYE REDNESS: 0
ALLERGIC/IMMUNOLOGIC NEGATIVE: 1
DIARRHEA: 0
BLOOD IN STOOL: 0
CHEST TIGHTNESS: 0
STRIDOR: 0
SHORTNESS OF BREATH: 0
SINUS PAIN: 1
WHEEZING: 0
NAUSEA: 0
COLOR CHANGE: 0
PHOTOPHOBIA: 0
SORE THROAT: 0
CONSTIPATION: 0
EYE ITCHING: 0
EYE DISCHARGE: 0
EYE PAIN: 0

## 2019-04-22 ASSESSMENT — PAIN SCALES - GENERAL: PAINLEVEL_OUTOF10: 8

## 2019-04-23 NOTE — ED PROVIDER NOTES
[facility]  eMERGENCYdEPARTMENT eNCOUnter      Pt Name: David Silver  MRN: [id number]  [place] 1950  Date of evaluation: 4/22/2019  PAUL Cadena CNP    CHIEF COMPLAINT       Chief Complaint   Patient presents with    Fever    Headache         HISTORY OF PRESENT ILLNESS  (Location/Symptom, Timing/Onset, Context/Setting, Quality, Duration, Modifying Factors, Severity.)   David Silver is a 71 y.o. female who presents to the emergency department who presents to the ED with c/o fever and headache that started today. Describes location of the headache as being located in the frontal sinus area and describes pain as an \"ache\". Says last week she was diagnosed with pharyngitis and placed on Augmentin but says she did not complete the Augmentin because it was too hard to swallow. Denies any neck pain. Says she has felt tired today. The history is provided by the patient. Nursing Notes were reviewed and I agree. REVIEW OF SYSTEMS    (2-9 systems for level 4, 10 or more for level 5)     Review of Systems   Constitutional: Positive for fever. Negative for activity change, appetite change, chills, diaphoresis, fatigue and unexpected weight change. HENT: Positive for sinus pain (frontal sinus pain ). Negative for ear pain, sore throat and trouble swallowing. Eyes: Negative for photophobia, pain, discharge, redness and itching. Respiratory: Negative for chest tightness, shortness of breath, wheezing and stridor. Cardiovascular: Negative for chest pain and leg swelling. Gastrointestinal: Negative for abdominal distention, abdominal pain, blood in stool, constipation, diarrhea, nausea and vomiting. Endocrine: Negative. Genitourinary: Negative for difficulty urinating and dysuria. Musculoskeletal: Negative for back pain, joint swelling, neck pain and neck stiffness. Skin: Negative for color change, pallor, rash and wound. Allergic/Immunologic: Negative. Neurological: Negative for dizziness and headaches. Hematological: Negative. Psychiatric/Behavioral: Negative. Except as noted above the remainder of the review of systems was reviewed and negative.        PAST MEDICAL HISTORY     Past Medical History:   Diagnosis Date    Arthritis     Cataract of both eyes     Chronic back pain     Hyperlipidemia     Hypertension     Hypothyroidism     Lumbosacral spondylosis without myelopathy     Lupus ([facility]ic lupus erythematosus) [facility]     Neuropathy 12/22/2017    Sickle cell anemia ([facility]     carrier    Type 2 diabetes mellitus without complication, without long-term current use of insulin [facility] 11/12/2018    Type II or unspecified type diabetes mellitus without mention of complication, not stated as uncontrolled          SURGICAL HISTORY       Past Surgical History:   Procedure Laterality Date    BACK SURGERY      lumbar    CHOLECYSTECTOMY      COLONOSCOPY      FOOT SURGERY Left 1/2014    bunion and hammer toe    TUBAL LIGATION      TYMPANOPLASTY           CURRENT MEDICATIONS       Discharge Medication List as of 4/22/2019  8:46 PM      CONTINUE these medications which have NOT CHANGED    Details   glimepiride (AMARYL) 1 MG tablet TAKE 1 TABLET EVERY MORNING BEFORE BREAKFAST, Disp-90 tablet, R-3Normal      FRANCHESCA CONTOUR TEST strip TEST BLOOD SUGAR DAILY AND AS NEEDED, Disp-100 strip, R-1Normal      levothyroxine (SYNTHROID) 125 MCG tablet Take 1 tablet by mouth Daily, Disp-90 tablet, R-1Normal      !! metoprolol succinate (TOPROL XL) 25 MG extended release tablet Take 1 tablet by mouth daily, Disp-30 tablet, R-0Normal      !! metoprolol succinate (TOPROL XL) 25 MG extended release tablet Take 1 tablet by mouth daily, Disp-90 tablet, R-1Normal      atorvastatin (LIPITOR) 40 MG tablet TAKE 1 TABLET EVERY NIGHT, Disp-90 tablet, R-3Normal      hydrochlorothiazide (MICROZIDE) 12.5 MG capsule Take 1 capsule by mouth daily, R-3Historical Med losartan (COZAAR) 50 MG tablet Take 2 tablets by mouth daily, R-3Historical Med      HYDROcodone-acetaminophen (NORCO)  MG per tablet Take 1 tablet by mouth every 8 hours as needed for Pain (may fill 7/23/18) for up to 30 days. ., Disp-90 tablet, R-0Normal      gabapentin (NEURONTIN) 300 MG capsule Take 1 capsule by mouth 2 times daily for 90 days. 3 month supply. , Disp-180 capsule, R-0Normal      alendronate (FOSAMAX) 70 MG tablet Take 70 mg by mouthHistorical Med      Misc. Devices (WRIST BRACE) MISC Bilateral carpal tunnel brace, Disp-2 each, R-0Print      allopurinol (ZYLOPRIM) 100 MG tablet Take 1 tablet by mouth daily, Disp-90 tablet, R-3Normal      vitamin D (ERGOCALCIFEROL) 01111 units CAPS capsule Take 1 capsule by mouth once a week, Disp-12 capsule, R-3Normal      topiramate (TOPAMAX) 100 MG tablet Take 1 tablet by mouth 2 times daily, Disp-180 tablet, R-0Normal      fluticasone (FLONASE) 50 MCG/ACT nasal spray USE 2 SPRAYS NASALLY EVERY DAY, Disp-48 g, R-1      azelastine (ASTELIN) 0.1 % nasal spray 2 sprays by Nasal route daily, R-3      pantoprazole (PROTONIX) 40 MG tablet Take 1 tablet by mouth daily, Disp-90 tablet, R-3       !! - Potential duplicate medications found. Please discuss with provider.           ALLERGIES     Glucophage [metformin] and Metformin and related    FAMILY HISTORY       Family History   Problem Relation Age of Onset    High Cholesterol Sister     High Cholesterol Brother     Sickle Cell Anemia Daughter     Sickle Cell Anemia Son           SOCIAL HISTORY       Social History     Socioeconomic History    Marital status:      Spouse name: None    Number of children: None    Years of education: None    Highest education level: None   Occupational History    None   Social Needs    Financial resource strain: None    Food insecurity:     Worry: None     Inability: None    Transportation needs:     Medical: None     Non-medical: None   Tobacco Use    Smoking status: Never Smoker    Smokeless tobacco: Never Used   Substance and Sexual Activity    Alcohol use: Yes     Comment: occasionally    Drug use: No    Sexual activity: None   Lifestyle    Physical activity:     Days per week: None     Minutes per session: None    Stress: None   Relationships    Social connections:     Talks on phone: None     Gets together: None     Attends Mosque service: None     Active member of club or organization: None     Attends meetings of clubs or organizations: None     Relationship status: None    Intimate partner violence:     Fear of current or ex partner: None     Emotionally abused: None     Physically abused: None     Forced sexual activity: None   Other Topics Concern    None   Social History Narrative    None       SCREENINGS           PHYSICAL EXAM    (up to 7 forlevel 4, 8 or more for level 5)     ED Triage Vitals [04/22/19 1951]   BP Temp Temp Source Pulse Resp SpO2 Height Weight   (!) 158/100 99.4 °F (37.4 °C) Oral 106 17 96 % 5' 3\" (1.6 m) 188 lb (85.3 kg)       Physical Exam   Constitutional: She is oriented to person, place, and time. She appears well-developed and well-nourished. No distress. HENT:   Head: Normocephalic and atraumatic. Nose: Nose normal.   Mouth/Throat: Oropharyngeal exudate (moderate amt of clear exudate in the posterior pharnyx, no erythema or edema noted in the posterior pharnyx ) present. Frontal sinus pain to palpation   Eyes: Pupils are equal, round, and reactive to light. Conjunctivae and EOM are normal. Right eye exhibits no discharge. Left eye exhibits no discharge. No scleral icterus. Neck: Normal range of motion. Neck supple. No JVD present. No tracheal deviation present. No nuchal rigidity   Cardiovascular: Normal rate, regular rhythm, normal heart sounds and intact distal pulses. Exam reveals no gallop and no friction rub. No murmur heard. Pulmonary/Chest: Effort normal and breath sounds normal. No stridor.  No respiratory distress. She has no wheezes. She has no rales. She exhibits no tenderness. Abdominal: Soft. Bowel sounds are normal. She exhibits no distension and no mass. There is no tenderness. There is no rebound and no guarding. No hernia. Musculoskeletal: Normal range of motion. She exhibits no edema, tenderness or deformity. Lymphadenopathy:     She has no cervical adenopathy. Neurological: She is alert and oriented to person, place, and time. No cranial nerve deficit or sensory deficit. She exhibits normal muscle tone. Coordination normal.   Skin: Skin is dry. Capillary refill takes less than 2 seconds. No rash noted. She is not diaphoretic. No erythema. No pallor. Psychiatric: She has a normal mood and affect. Her behavior is normal.   Nursing note and vitals reviewed. DIAGNOSTIC RESULTS     RADIOLOGY:   Non-plain film images such as CT, Ultrasound and MRI are read by the radiologist. Plain radiographic images are visualized and preliminarilyinterpreted by No att. providers found with the below findings:      Interpretation per the Radiologist below, if available at the time of this note:    No orders to display       LABS:  Labs Reviewed   RAPID INFLUENZA A/B ANTIGENS       All other labs were within normal range or notreturned as of this dictation. RE-ASSESSMENT          EMERGENCY DEPARTMENT COURSE and DIFFERENTIAL DIAGNOSIS/MDM:   Vitals:    Vitals:    04/22/19 1951 04/22/19 2042   BP: (!) 158/100 (!) 133/93   Pulse: 106    Resp: 17    Temp: 99.4 °F (37.4 °C)    TempSrc: Oral    SpO2: 96%    Weight: 188 lb (85.3 kg)    Height: 5' 3\" (1.6 m)            MDM  Number of Diagnoses or Management Options  Acute non-recurrent frontal sinusitis: established, improving  Diagnosis management comments: Patient presented to the ED for evaluation of headache and fever. Flu swab and rapid strep screening were both negative. Patient was diagnosed with sinusitis and placed on omnicef.  Instructed patient

## 2019-07-22 NOTE — PROGRESS NOTES
YOB: 1950  Location: [facility]  Location Address: [address] [place] [address], [facility], [address] [place] [address]  Location Phone: [phone]    Chief Complaint   Patient presents with   • Follow-up     sinus       History of Present Illness  Camila Wallace is a 68 y.o. female.  Camila Wallace is here for follow up of ENT complaints. The patient has had problems with nasal congestion, drainage, postnasal drainage, sinusitis and allergy.  The symptoms are not localized to a particular location. The patient has had improved symptoms. The symptoms have been present for the last several years. The symptoms are aggravated by weather change and allergy. The symptoms are improved by nasal sprays and antibiotics for acute flair-ups in the past.         Past Medical History:   Diagnosis Date   • Allergic rhinitis    • Chronic sinusitis    • Sommer bullosa    • Disease of thyroid gland    • GERD (gastroesophageal reflux disease)    • Graves disease    • HLD (hyperlipidemia)    • HTN (hypertension)    • Obstructive sleep apnea    • Type 2 diabetes mellitus (CMS/HCC)    • Vasomotor rhinitis        Past Surgical History:   Procedure Laterality Date   • CHOLECYSTECTOMY     • COLONOSCOPY  10/29/2013    diverticulosis   • COLONOSCOPY N/A 7/10/2018    Procedure: COLONOSCOPY WITH ANESTHESIA;  Surgeon: Donald Menchaca DO;  Location: [facility] ENDOSCOPY;  Service: Gastroenterology   • FOOT SURGERY     • TUBAL ABDOMINAL LIGATION     • TYMPANIC MEMBRANE REPAIR         Outpatient Medications Marked as Taking for the 19 encounter (Office Visit) with Zack Casanova MD   Medication Sig Dispense Refill   • alendronate (FOSAMAX) 70 MG tablet Take 70 mg by mouth.     • atorvastatin (LIPITOR) 40 MG tablet Take 40 mg by mouth Daily.     • glimepiride (AMARYL) 1 MG tablet Take 1 mg by mouth Daily.     • Glucose Blood (BLOOD GLUCOSE TEST) strip Test blood sugar daily and prn     • HYDROcodone-acetaminophen (NORCO)  MG per  tablet TAKE 1 TABLET BY MOUTH THREE TIMES A DAY  EFFECTIVE DATE 3 21 19  0   • ipratropium (ATROVENT) 0.06 % nasal spray 2 sprays into the nostril(s) as directed by provider 2 (Two) Times a Day. 45 mL 3   • levothyroxine (SYNTHROID, LEVOTHROID) 100 MCG tablet TAKE 1 TABLET EVERY DAY     • losartan (COZAAR) 50 MG tablet Take  by mouth.     • nystatin (MYCOSTATIN) 556011 UNIT/GM powder Apply  topically to the appropriate area as directed 3 (Three) Times a Day. 30 g 0   • pantoprazole (PROTONIX) 40 MG EC tablet Take 40 mg by mouth Daily.     • risedronate (ACTONEL) 150 MG tablet Take  by mouth.     • topiramate (TOPAMAX) 100 MG tablet Take 100 mg by mouth 2 (Two) Times a Day.     • vitamin D (ERGOCALCIFEROL) 32673 UNITS capsule capsule Take 50,000 Units by mouth 1 (One) Time Per Week.     • [DISCONTINUED] ipratropium (ATROVENT) 0.06 % nasal spray USE 2 SPRAYS IN EACH NOSTRIL TWICE DAILY AS NEEDED FOR RHINITIS  45 mL 3       Adhesive tape and Metformin and related    Family History   Problem Relation Age of Onset   • Diabetes Mother    • Heart disease Mother    • Hypertension Mother    • Hypertension Father    • Diabetes Brother    • Hypertension Brother    • Hyperlipidemia Brother    • Colon polyps Brother    • Hyperlipidemia Maternal Uncle    • Thyroid disease Maternal Uncle    • Cancer Maternal Grandmother    • Colon polyps Daughter        Social History     Socioeconomic History   • Marital status:      Spouse name: Not on file   • Number of children: Not on file   • Years of education: Not on file   • Highest education level: Not on file   Tobacco Use   • Smoking status: Never Smoker   • Smokeless tobacco: Never Used   Substance and Sexual Activity   • Alcohol use: Yes     Comment: occasionally   • Drug use: No   • Sexual activity: Defer       Review of Systems   Constitutional: Negative for activity change, appetite change, chills, diaphoresis, fatigue, fever and unexpected weight change.   HENT: Positive  for postnasal drip and rhinorrhea. Negative for congestion, dental problem, drooling, ear discharge, ear pain, facial swelling, hearing loss, mouth sores, nosebleeds, sinus pressure, sneezing, sore throat, tinnitus, trouble swallowing and voice change.    Eyes: Negative.    Respiratory: Negative.    Cardiovascular: Negative.    Gastrointestinal: Negative.    Endocrine: Negative.    Skin: Negative.    Allergic/Immunologic: Positive for environmental allergies. Negative for food allergies and immunocompromised state.   Neurological: Negative.    Hematological: Negative.    Psychiatric/Behavioral: Negative.        Vitals:    07/23/19 1458   BP: 144/90   Pulse: 80   Temp: 97.7 °F (36.5 °C)       Body mass index is 31.35 kg/m².    Objective     Physical Exam  CONSTITUTIONAL: well nourished, alert, oriented, in no acute distress     COMMUNICATION AND VOICE: able to communicate normally, normal voice quality    HEAD: normocephalic, no lesions, atraumatic, no tenderness, no masses     FACE: appearance normal, no lesions, no tenderness, no deformities, facial motion symmetric    EYES: ocular motility normal, eyelids normal, orbits normal, no proptosis, conjunctiva normal , pupils equal, round     EARS:  Hearing: response to conversational voice normal bilaterally   External Ears: auricles without lesions  Otoscopic: tympanic membrane appearance normal, no lesions, no perforation, normal mobility, no fluid    NOSE:  External Nose: structure normal, no tenderness on palpation, no nasal discharge, no lesions, no evidence of trauma, nostrils patent   Intranasal Exam: nasal mucosa erythema and edema, vestibule within normal limits, inferior turbinate normal, nasal septum midline   Nasopharynx:     ORAL:  Lips: upper and lower lips without lesion   Teeth: dentition within normal limits for age   Gums: gingivae healthy   Oral Mucosa: oral mucosa normal, no mucosal lesions   Floor of Mouth: Warthin’s duct patent, mucosa  normal  Tongue: lingual mucosa normal without lesions, normal tongue mobility   Palate: soft and hard palates with normal mucosa and structure  Oropharynx: oropharyngeal mucosa mild erythema with postnasal drainage    NECK: neck appearance normal, no mass,  noted without erythema or tenderness    THYROID: no overt thyromegaly, no tenderness, nodules or mass present on palpation, position midline     LYMPH NODES: no lymphadenopathy    CHEST/RESPIRATORY: respiratory effort normal, normal breath sounds     CARDIOVASCULAR: rate and rhythm normal, extremities without cyanosis or edema      NEUROLOGIC/PSYCHIATRIC: oriented to time, place and person, mood normal, affect appropriate, CN II-XII intact grossly    Assessment/Plan   Camila was seen today for follow-up.    Diagnoses and all orders for this visit:    Allergic rhinitis due to pollen, unspecified seasonality  -     ipratropium (ATROVENT) 0.06 % nasal spray; 2 sprays into the nostril(s) as directed by provider 2 (Two) Times a Day.  -     azelastine (ASTELIN) 0.1 % nasal spray; 2 sprays into the nostril(s) as directed by provider 2 (Two) Times a Day. Use in each nostril as directed  -     montelukast (SINGULAIR) 10 MG tablet; Take 1 tablet by mouth Daily.    Chronic sinusitis, unspecified location  -     ipratropium (ATROVENT) 0.06 % nasal spray; 2 sprays into the nostril(s) as directed by provider 2 (Two) Times a Day.  -     azelastine (ASTELIN) 0.1 % nasal spray; 2 sprays into the nostril(s) as directed by provider 2 (Two) Times a Day. Use in each nostril as directed  -     montelukast (SINGULAIR) 10 MG tablet; Take 1 tablet by mouth Daily.    PND (post-nasal drip)  -     ipratropium (ATROVENT) 0.06 % nasal spray; 2 sprays into the nostril(s) as directed by provider 2 (Two) Times a Day.  -     azelastine (ASTELIN) 0.1 % nasal spray; 2 sprays into the nostril(s) as directed by provider 2 (Two) Times a Day. Use in each nostril as directed  -     montelukast  (SINGULAIR) 10 MG tablet; Take 1 tablet by mouth Daily.      * Surgery not found *  No orders of the defined types were placed in this encounter.    Return in about 1 year (around 7/23/2020) for Recheck sinus/allergy .       Patient Instructions   Continue treatment as directed, if symptoms develop call for sooner appointment.

## 2019-07-23 ENCOUNTER — OFFICE VISIT (OUTPATIENT)
Dept: OTOLARYNGOLOGY | Facility: CLINIC | Age: 69
End: 2019-07-23

## 2019-07-23 VITALS
TEMPERATURE: 97.7 F | SYSTOLIC BLOOD PRESSURE: 144 MMHG | DIASTOLIC BLOOD PRESSURE: 90 MMHG | HEART RATE: 80 BPM | WEIGHT: 177 LBS | BODY MASS INDEX: 31.36 KG/M2 | HEIGHT: 63 IN

## 2019-07-23 DIAGNOSIS — R09.82 PND (POST-NASAL DRIP): ICD-10-CM

## 2019-07-23 DIAGNOSIS — J32.9 CHRONIC SINUSITIS, UNSPECIFIED LOCATION: ICD-10-CM

## 2019-07-23 DIAGNOSIS — J30.1 ALLERGIC RHINITIS DUE TO POLLEN, UNSPECIFIED SEASONALITY: Primary | ICD-10-CM

## 2019-07-23 PROCEDURE — 99214 OFFICE O/P EST MOD 30 MIN: CPT | Performed by: PHYSICIAN ASSISTANT

## 2019-07-23 RX ORDER — AZELASTINE 1 MG/ML
2 SPRAY, METERED NASAL 2 TIMES DAILY
Qty: 90 ML | Refills: 3 | Status: SHIPPED | OUTPATIENT
Start: 2019-07-23 | End: 2020-07-23 | Stop reason: SDUPTHER

## 2019-07-23 RX ORDER — IPRATROPIUM BROMIDE 42 UG/1
2 SPRAY, METERED NASAL 2 TIMES DAILY
Qty: 45 ML | Refills: 3 | Status: SHIPPED | OUTPATIENT
Start: 2019-07-23 | End: 2020-07-23 | Stop reason: SDUPTHER

## 2019-07-23 RX ORDER — MONTELUKAST SODIUM 10 MG/1
10 TABLET ORAL DAILY
Qty: 90 TABLET | Refills: 3 | Status: SHIPPED | OUTPATIENT
Start: 2019-07-23 | End: 2020-03-17

## 2019-07-26 ENCOUNTER — NURSE TRIAGE (OUTPATIENT)
Dept: CALL CENTER | Facility: HOSPITAL | Age: 69
End: 2019-07-26

## 2019-07-26 NOTE — TELEPHONE ENCOUNTER
"Will  Be calling the office    Reason for Disposition  • Requesting regular office appointment    Additional Information  • Negative: Lab calling with strep throat test results and triager can call in prescription  • Negative: Lab calling with urinalysis test results and triager can call in prescription  • Negative: Medication questions  • Negative: ED call to PCP  • Negative: Physician call to PCP  • Negative: Call about patient who is currently hospitalized  • Negative: Lab or radiology calling with CRITICAL test results  • Negative: [1] Prescription not at pharmacy AND [2] was prescribed today by PCP  • Negative: [1] Follow-up call from patient regarding patient's clinical status AND [2] information urgent  • Negative: [1] Caller requests to speak ONLY to PCP AND [2] URGENT question  • Negative: [1] Caller requests to speak to PCP now AND [2] won't tell us reason for call  (Exception: if 10 pm to 6 am, caller must first discuss reason for the call)  • Negative: Notification of hospital admission  • Negative: Notification of death  • Negative: Caller requesting lab results  • Negative: Lab or radiology calling with test results  • Negative: [1] Follow-up call from patient regarding patient's clinical status AND [2] information NON-URGENT  • Negative: [1] Caller requests to speak ONLY to PCP AND [2] NON-URGENT question  • Negative: [1] Caller is not with the adult (patient) AND [2] reporting urgent symptoms  • Negative: Lab result questions  • Negative: Medication questions  • Negative: Caller cannot be reached by phone  • Negative: Caller has already spoken to PCP or another triager  • Negative: RN needs further essential information from caller in order to complete triage    Answer Assessment - Initial Assessment Questions  1. REASON FOR CALL or QUESTION: \"What is your reason for calling today?\" or \"How can I best  help you?\" or \"What question do you have that I can help answer?\"      Needing an office " "appointment  2. CALLER: Document the source of call. (e.g., laboratory, patient).      patient    Answer Assessment - Initial Assessment Questions  1. REASON FOR CALL or QUESTION: \"What is your reason for calling today?\" or \"How can I best help you?\" or \"What question do you have that I can help answer?\"      Needing medication for itching and burning and vag discharge, will be calling the office    Protocols used: INFORMATION ONLY CALL-ADULT-AH, PCP CALL - NO TRIAGE-ADULT-AH      "

## 2019-12-02 ENCOUNTER — TELEPHONE (OUTPATIENT)
Dept: VASCULAR SURGERY | Facility: CLINIC | Age: 69
End: 2019-12-02

## 2020-04-07 ENCOUNTER — TELEPHONE (OUTPATIENT)
Dept: PODIATRY | Facility: CLINIC | Age: 70
End: 2020-04-07

## 2020-04-07 NOTE — TELEPHONE ENCOUNTER
Spoke with patient and advised that 4/21/2020 appointment was cancelled due to Covid 19.  Advised that we would RS as soon as we are able.

## 2020-04-09 PROCEDURE — U0002 COVID-19 LAB TEST NON-CDC: HCPCS | Performed by: NURSE PRACTITIONER

## 2020-04-10 ENCOUNTER — TELEPHONE (OUTPATIENT)
Dept: URGENT CARE | Facility: CLINIC | Age: 70
End: 2020-04-10

## 2020-04-10 NOTE — TELEPHONE ENCOUNTER
COVID-19 Test Result   Telephone Encounter    Patient Name: Camila Wallace   : 1950   MRN: [id number]     COVID19   Date Value Ref Range Status   2020 Not Detected Not Detected Final        Patient was counseled as follows:  • (-) negative COVID-19 test result with or without symptoms   • The test is not perfect, so there is a chance it could be falsely negative or the virus level is too low for detection due to being very early in the infectious process.   • The optimal duration of home isolation is uncertain. The [place] Centers for Disease Control and Prevention (CDC) has issued recommendations on discontinuation of home isolation.   • For this reason, Camila is strongly encouraged to practice the safest standards in protecting their health and others given the current pandemic concerns. She is advised to:   o Practice social distancing in the community by staying at least 6 feet away from people   o Encouraged to use face mask while out in public   o Continue to wash their hands frequently with soap and hot water, and cover their mouth while coughing.   • If Camila is asymptomatic, she should self isolate for a total of 14 days from time of potential contact with Covid-19.   • If Camila is symptomatic then she may discontinue home isolation when the following criteria are met:   o At least seven days have passed since symptoms first appeared AND   o At least three days (72 hours) have passed since recovery of symptoms (defined as resolution of fever without the use of fever-reducing medications and improvement in respiratory symptoms [e.g., cough, shortness of breath])   • If Camila has been asymptomatic but then develops non-emergent symptoms such as mild increased shortness of breath, fever, cough, or for other questions, she  was asked to please call their primary care physician’s office or the [place] [facility] at [phone].   · Questions were engaged and answered to the best of my  ability. She         expressed verbal understanding of their test results and my advice.    Primary Care Physician verified as being: Sandra Wells PA      Electronically signed by TONEY Jacobs, 04/10/20, 4:35 PM.

## 2020-05-11 ENCOUNTER — TELEPHONE (OUTPATIENT)
Dept: PODIATRY | Facility: CLINIC | Age: 70
End: 2020-05-11

## 2020-06-17 NOTE — PROGRESS NOTES
[facility] - PODIATRY    Today's Date: 06/23/20    Patient Name: Camila Wallace  MRN: [id number]  [facility]: [id number]  PCP: Sandra Wells PA  Referring Provider: Graeme Flores MD    SUBJECTIVE     Chief Complaint   Patient presents with   • Establish Care     referral for diabetic foot and nail care - pt c/o right 5th toe lesion, calluses, long, toenails ,bilateral foot pain - pain scale 5/10   • Diabetes     pt doesnt check blood sugar -  PCP Sandra ALBA last visit 2/26/20     HPI: Camila Wallace, a 70 y.o.female, comes to clinic as a(n) new patient presenting for diabetic foot exam, complaining of painful toenails and calluses. Patient has h/o farida bullosa, thyroid disease, GERD, Graves Disease, HLD, HTN, FAUSTO, DM2. Patient is NIDDM and unsure of last BG level. Relates only occasional numbness/tingling in feet. Denies open wounds or sores. States that her toenails are long, thick and discolored. Also has painful calluses to 5th toes. Admits pain at 5/10 level and described as aching and tingling. Denies previous treatment. Denies any constitutional symptoms. No other pedal complaints at this time.    Past Medical History:   Diagnosis Date   • Allergic rhinitis    • Chronic sinusitis    • Farida bullosa    • Disease of thyroid gland    • GERD (gastroesophageal reflux disease)    • Graves disease    • HLD (hyperlipidemia)    • HTN (hypertension)    • Obstructive sleep apnea    • Type 2 diabetes mellitus (CMS/HCC)    • Vasomotor rhinitis      Past Surgical History:   Procedure Laterality Date   • CHOLECYSTECTOMY     • COLONOSCOPY  10/29/2013    diverticulosis   • COLONOSCOPY N/A 7/10/2018    Procedure: COLONOSCOPY WITH ANESTHESIA;  Surgeon: Donald Menchaca DO;  Location: [facility] ENDOSCOPY;  Service: Gastroenterology   • FOOT SURGERY     • TUBAL ABDOMINAL LIGATION     • TYMPANIC MEMBRANE REPAIR       Family History   Problem Relation Age of Onset   • Diabetes Mother    • Heart  disease Mother    • Hypertension Mother    • Hypertension Father    • Diabetes Brother    • Hypertension Brother    • Hyperlipidemia Brother    • Colon polyps Brother    • Hyperlipidemia Maternal Uncle    • Thyroid disease Maternal Uncle    • Cancer Maternal Grandmother    • Colon polyps Daughter      Social History     Socioeconomic History   • Marital status:      Spouse name: Not on file   • Number of children: Not on file   • Years of education: Not on file   • Highest education level: Not on file   Tobacco Use   • Smoking status: Never Smoker   • Smokeless tobacco: Never Used   Substance and Sexual Activity   • Alcohol use: Yes     Comment: occasionally   • Drug use: No   • Sexual activity: Defer     Allergies   Allergen Reactions   • Metformin And Related Swelling     Current Outpatient Medications   Medication Sig Dispense Refill   • alendronate (FOSAMAX) 70 MG tablet Take 70 mg by mouth.     • allopurinol (ZYLOPRIM) 100 MG tablet Take 100 mg by mouth Daily.     • atorvastatin (LIPITOR) 40 MG tablet Take 40 mg by mouth Daily.     • azelastine (ASTELIN) 0.1 % nasal spray 2 sprays into the nostril(s) as directed by provider 2 (Two) Times a Day. Use in each nostril as directed 90 mL 3   • cetirizine (zyrTEC) 10 MG tablet Take 1 tablet by mouth At Night As Needed for Allergies (sore throat and cough). 30 tablet 0   • cloNIDine (CATAPRES) 0.1 MG tablet Take 1 tablet by mouth Every 6 (Six) Hours As Needed for High Blood Pressure (for blood pressure >160/110). 30 tablet 0   • Glucose Blood (BLOOD GLUCOSE TEST) strip Test blood sugar daily and prn     • HYDROcodone-acetaminophen (NORCO)  MG per tablet TAKE 1 TABLET BY MOUTH THREE TIMES A DAY  EFFECTIVE DATE 3 21 19  0   • ipratropium (ATROVENT) 0.06 % nasal spray 2 sprays into the nostril(s) as directed by provider 2 (Two) Times a Day. 45 mL 3   • lidocaine-prilocaine (EMLA) 2.5-2.5 % cream      • losartan (COZAAR) 50 MG tablet Take 50 mg by mouth  Daily.     • metoprolol succinate XL (TOPROL-XL) 50 MG 24 hr tablet Take 50 mg by mouth Daily.     • nystatin (MYCOSTATIN) 406379 UNIT/GM powder Apply  topically to the appropriate area as directed 3 (Three) Times a Day. 30 g 0   • pantoprazole (PROTONIX) 40 MG EC tablet Take 40 mg by mouth Daily.     • SYNTHROID 100 MCG tablet Take 112 mcg by mouth Daily.     • topiramate (TOPAMAX) 100 MG tablet Take 100 mg by mouth 2 (Two) Times a Day.     • vitamin D (ERGOCALCIFEROL) 92883 UNITS capsule capsule Take 50,000 Units by mouth 1 (One) Time Per Week.       No current facility-administered medications for this visit.      Review of Systems   Constitutional: Negative for chills and fever.   HENT: Negative for congestion.    Respiratory: Negative for shortness of breath.    Cardiovascular: Negative for chest pain and leg swelling.   Gastrointestinal: Negative for constipation, diarrhea, nausea and vomiting.   Musculoskeletal:        Foot pain   Skin: Negative for wound.   Neurological: Negative for numbness.       OBJECTIVE     Vitals:    06/23/20 1342   BP: 148/80   Pulse: 58   SpO2: 98%       PHYSICAL EXAM  GEN:   Accompanied by none.     Foot/Ankle Exam:       General:   Diabetic Foot Exam Performed    Appearance: appears stated age and healthy    Orientation: AAOx3    Affect: appropriate    Gait: unimpaired    Assistance: independent    Shoe Gear:  Casual shoes    VASCULAR      Right Foot Vascularity   Dorsalis pedis:  2+  Posterior tibial:  2+  Skin Temperature: warm    Edema Grading:  None  CFT:  3  Pedal Hair Growth:  Present  Varicosities: none       Left Foot Vascularity   Dorsalis pedis:  2+  Posterior tibial:  2+  Skin Temperature: warm    Edema Grading:  None  CFT:  3  Pedal Hair Growth:  Present  Varicosities: none        NEUROLOGIC     Right Foot Neurologic   Light touch sensation:  Diminished  Vibratory sensation:  Normal  Hot/Cold sensation: normal    Protective Sensation using [place]-Narcisa  Monofilament:  10     Left Foot Neurologic   Light touch sensation:  Diminished  Vibratory sensation:  Normal  Hot/cold sensation: normal    Protective Sensation using [place]-Narcisa Monofilament:  10     MUSCULOSKELETAL      Right Foot Musculoskeletal   Ecchymosis:  None  Tenderness: right foot callus, toenails and toe 5    Arch:  Normal  Hammertoe:  Fifth toe     Left Foot Musculoskeletal   Ecchymosis:  None  Tenderness: left foot callus, toenails and toe 5    Arch:  Normal  Hammertoe:  Fifth toe     MUSCLE STRENGTH     Right Foot Muscle Strength   Foot dorsiflexion:  5  Foot plantar flexion:  5  Foot inversion:  5  Foot eversion:  5     Left Foot Muscle Strength   Foot dorsiflexion:  5  Foot plantar flexion:  5  Foot inversion:  5  Foot eversion:  5     RANGE OF MOTION      Right Foot Range of Motion   Foot and ankle ROM within normal limits       Left Foot Range of Motion   Foot and ankle ROM within normal limits       DERMATOLOGIC     Right Foot Dermatologic   Skin: corn (Dorsal lateral 5th toe. Plantar medial HIPJ.)    Nails: onychomycosis, abnormally thick, subungual debris and dystrophic nails       Left Foot Dermatologic   Skin: corn (Dorsal lateral 5th toe. Plantar medial HIPJ)    Nails: onychomycosis, abnormally thick, subungual debris and dystrophic nails        RADIOLOGY/NUCLEAR:  No results found.    LABORATORY/CULTURE RESULTS:      PATHOLOGY RESULTS:       ASSESSMENT/PLAN     Camila was seen today for establish care and diabetes.    Diagnoses and all orders for this visit:    Onychomycosis    Type 2 diabetes mellitus without complication, without long-term current use of insulin (CMS/[facility])    Foot pain, bilateral    Hammer toes of both feet      Comprehensive lower extremity examination and evaluation was performed.  Discussed findings and treatment plan including risks, benefits, and treatment options with patient in detail. Patient agreed with treatment plan.  After verbal consent obtained, nail(s)  x10 debrided of length and thickness with nail nipper without incidence  After verbal consent obtained, calluses x4 pared utilizing dermal curette and/or scalpel without incidence  Patient may maintain nails and calluses at home utilizing emery board or pumice stone between visits as needed  Reviewed at home diabetic foot care including daily foot checks   An After Visit Summary was printed and given to the patient at discharge, including (if requested) any available informative/educational handouts regarding diagnosis, treatment, or medications. All questions were answered to patient/family satisfaction. Should symptoms fail to improve or worsen they agree to call or return to clinic or to go to the Emergency Department. Discussed the importance of following up with any needed screening tests/labs/specialist appointments and any requested follow-up recommended by me today. Importance of maintaining follow-up discussed and patient accepts that missed appointments can delay diagnosis and potentially lead to worsening of conditions.  Return in about 6 months (around 12/23/2020)., or sooner if acute issues arise.    Lab Frequency Next Occurrence       This document has been electronically signed by Charles Hart DPM on June 23, 2020 14:04

## 2020-06-22 ENCOUNTER — TELEPHONE (OUTPATIENT)
Dept: PODIATRY | Facility: CLINIC | Age: 70
End: 2020-06-22

## 2020-06-22 NOTE — TELEPHONE ENCOUNTER
Attempted to call patient for appointment reminder to see Dr. Hart - no answer, or voicemail setup. DN

## 2020-06-23 ENCOUNTER — OFFICE VISIT (OUTPATIENT)
Dept: PODIATRY | Facility: CLINIC | Age: 70
End: 2020-06-23

## 2020-06-23 VITALS
WEIGHT: 168 LBS | HEART RATE: 58 BPM | OXYGEN SATURATION: 98 % | SYSTOLIC BLOOD PRESSURE: 148 MMHG | HEIGHT: 63 IN | DIASTOLIC BLOOD PRESSURE: 80 MMHG | BODY MASS INDEX: 29.77 KG/M2

## 2020-06-23 DIAGNOSIS — B35.1 ONYCHOMYCOSIS: Primary | ICD-10-CM

## 2020-06-23 DIAGNOSIS — E11.9 TYPE 2 DIABETES MELLITUS WITHOUT COMPLICATION, WITHOUT LONG-TERM CURRENT USE OF INSULIN (HCC): ICD-10-CM

## 2020-06-23 DIAGNOSIS — M79.672 FOOT PAIN, BILATERAL: ICD-10-CM

## 2020-06-23 DIAGNOSIS — M79.671 FOOT PAIN, BILATERAL: ICD-10-CM

## 2020-06-23 DIAGNOSIS — M20.42 HAMMER TOES OF BOTH FEET: ICD-10-CM

## 2020-06-23 DIAGNOSIS — M20.41 HAMMER TOES OF BOTH FEET: ICD-10-CM

## 2020-06-23 PROCEDURE — 11056 PARNG/CUTG B9 HYPRKR LES 2-4: CPT | Performed by: PODIATRIST

## 2020-06-23 PROCEDURE — 99203 OFFICE O/P NEW LOW 30 MIN: CPT | Performed by: PODIATRIST

## 2020-06-23 PROCEDURE — 11721 DEBRIDE NAIL 6 OR MORE: CPT | Performed by: PODIATRIST

## 2020-07-22 NOTE — PROGRESS NOTES
YOB: 1950  Location: [facility]  Location Address: [address] [place] [address], [facility], [address] [place] [address]  Location Phone: [phone]    Chief Complaint   Patient presents with   • Follow-up       History of Present Illness  Camila Wallace is a 68 y.o. female.  Camila Wallace is here for follow up of ENT complaints. The patient has had problems with intermittent acid reflux, nasal congestion, drainage, postnasal drainage, sinusitis and allergy.  The symptoms are not localized to a particular location. The patient has had relatively stable symptoms. The symptoms have been present for the last several years. The symptoms are aggravated by weather change and allergy. The symptoms are improved by nasal sprays and antibiotics for acute flair-ups in the past. She reports having intermittent reflux symptoms even on Protonix.            Past Medical History:   Diagnosis Date   • Allergic rhinitis    • Chronic sinusitis    • Sommer bullosa    • Disease of thyroid gland    • GERD (gastroesophageal reflux disease)    • Graves disease    • HLD (hyperlipidemia)    • HTN (hypertension)    • Obstructive sleep apnea    • Type 2 diabetes mellitus (CMS/HCC)    • Vasomotor rhinitis        Past Surgical History:   Procedure Laterality Date   • CHOLECYSTECTOMY     • COLONOSCOPY  10/29/2013    diverticulosis   • COLONOSCOPY N/A 7/10/2018    Procedure: COLONOSCOPY WITH ANESTHESIA;  Surgeon: Donald Menchaca DO;  Location: [facility] ENDOSCOPY;  Service: Gastroenterology   • FOOT SURGERY     • TUBAL ABDOMINAL LIGATION     • TYMPANIC MEMBRANE REPAIR         Outpatient Medications Marked as Taking for the 20 encounter (Office Visit) with Zack Casanova MD   Medication Sig Dispense Refill   • alendronate (FOSAMAX) 70 MG tablet Take 70 mg by mouth.     • allopurinol (ZYLOPRIM) 100 MG tablet Take 100 mg by mouth Daily.     • atorvastatin (LIPITOR) 40 MG tablet Take 40 mg by mouth Daily.     • azelastine (ASTELIN) 0.1 %  nasal spray 2 sprays into the nostril(s) as directed by provider 2 (Two) Times a Day. Use in each nostril as directed 90 mL 3   • cetirizine (zyrTEC) 10 MG tablet Take 1 tablet by mouth At Night As Needed for Allergies (sore throat and cough). 30 tablet 0   • cloNIDine (CATAPRES) 0.1 MG tablet Take 1 tablet by mouth Every 6 (Six) Hours As Needed for High Blood Pressure (for blood pressure >160/110). 30 tablet 0   • HYDROcodone-acetaminophen (NORCO)  MG per tablet TAKE 1 TABLET BY MOUTH THREE TIMES A DAY  EFFECTIVE DATE 3 21 19  0   • ipratropium (ATROVENT) 0.06 % nasal spray 2 sprays into the nostril(s) as directed by provider 2 (Two) Times a Day. 45 mL 3   • lidocaine-prilocaine (EMLA) 2.5-2.5 % cream      • losartan (COZAAR) 50 MG tablet Take 50 mg by mouth Daily.     • metoprolol succinate XL (TOPROL-XL) 50 MG 24 hr tablet Take 50 mg by mouth Daily.     • montelukast (SINGULAIR) 10 MG tablet      • nystatin (MYCOSTATIN) 070072 UNIT/GM powder Apply  topically to the appropriate area as directed 3 (Three) Times a Day. 30 g 0   • pantoprazole (PROTONIX) 40 MG EC tablet Take 40 mg by mouth Daily.     • SYNTHROID 100 MCG tablet Take 112 mcg by mouth Daily.     • topiramate (TOPAMAX) 100 MG tablet Take 100 mg by mouth 2 (Two) Times a Day.     • vitamin D (ERGOCALCIFEROL) 04592 UNITS capsule capsule Take 50,000 Units by mouth 1 (One) Time Per Week.     • [DISCONTINUED] azelastine (ASTELIN) 0.1 % nasal spray 2 sprays into the nostril(s) as directed by provider 2 (Two) Times a Day. Use in each nostril as directed 90 mL 3   • [DISCONTINUED] ipratropium (ATROVENT) 0.06 % nasal spray 2 sprays into the nostril(s) as directed by provider 2 (Two) Times a Day. 45 mL 3       Metformin and related    Family History   Problem Relation Age of Onset   • Diabetes Mother    • Heart disease Mother    • Hypertension Mother    • Hypertension Father    • Diabetes Brother    • Hypertension Brother    • Hyperlipidemia Brother    •  Colon polyps Brother    • Hyperlipidemia Maternal Uncle    • Thyroid disease Maternal Uncle    • Cancer Maternal Grandmother    • Colon polyps Daughter        Social History     Socioeconomic History   • Marital status:      Spouse name: Not on file   • Number of children: Not on file   • Years of education: Not on file   • Highest education level: Not on file   Tobacco Use   • Smoking status: Never Smoker   • Smokeless tobacco: Never Used   Substance and Sexual Activity   • Alcohol use: Yes     Comment: occasionally   • Drug use: No   • Sexual activity: Defer       Review of Systems   Constitutional: Negative for activity change, appetite change, chills, diaphoresis, fatigue, fever and unexpected weight change.   HENT: Positive for postnasal drip. Negative for congestion, dental problem, drooling, ear discharge, ear pain, facial swelling, hearing loss, mouth sores, nosebleeds, rhinorrhea, sinus pressure, sneezing, sore throat, tinnitus, trouble swallowing and voice change.    Eyes: Negative.    Respiratory: Negative.    Cardiovascular: Negative.    Gastrointestinal: Negative.         GERD   Endocrine: Negative.    Skin: Negative.    Allergic/Immunologic: Positive for environmental allergies. Negative for food allergies and immunocompromised state.   Neurological: Negative.    Hematological: Negative.    Psychiatric/Behavioral: Negative.        Vitals:    07/23/20 1316   BP: 148/87   Pulse: 72   Temp: 97.7 °F (36.5 °C)       Body mass index is 30.43 kg/m².    Objective     Physical Exam  Physical Exam  CONSTITUTIONAL: well nourished, alert, oriented, in no acute distress      COMMUNICATION AND VOICE: able to communicate normally, normal voice quality     HEAD: normocephalic, no lesions, atraumatic, no tenderness, no masses      FACE: appearance normal, no lesions, no tenderness, no deformities, facial motion symmetric     SALIVARY GLANDS: parotid glands with no tenderness, no swelling, no masses,  submandibular glands with normal size, nontender     EYES: ocular motility normal, eyelids normal, orbits normal, no proptosis, conjunctiva normal , pupils equal, round      EARS:  Hearing: response to conversational voice normal bilaterally   External Ears: auricles without lesions  Otoscopic: tympanic membrane appearance normal, no lesions, no perforation, normal mobility, no fluid     NOSE:  External Nose: structure normal, no tenderness on palpation, no nasal discharge, no lesions, no evidence of trauma, nostrils patent   Intranasal Exam: nasal mucosa normal, vestibule within normal limits, inferior turbinate normal, nasal septum midline      ORAL:  Lips: upper and lower lips without lesion   Teeth: dentition within normal limits for age   Gums: gingivae healthy   Oral Mucosa: oral mucosa normal, no mucosal lesions   Floor of Mouth: Warthin’s duct patent, mucosa normal  Tongue: lingual mucosa normal without lesions, normal tongue mobility   Palate: soft and hard palates with normal mucosa and structure  Oropharynx: oropharyngeal mucosa mild erythema with postnasal drainage     NECK: neck appearance normal, no mass,  noted without erythema or tenderness     THYROID: no overt thyromegaly, no tenderness, nodules or mass present on palpation, position midline      LYMPH NODES: no lymphadenopathy     CHEST/RESPIRATORY: respiratory effort normaL     CARDIOVASCULAR: extremities without cyanosis or edema       NEUROLOGIC/PSYCHIATRIC: oriented to time, place and person, mood normal, affect appropriate, CN II-XII intact grossly    Assessment/Plan   Problems Addressed this Visit        Respiratory    Allergic rhinitis due to pollen - Primary    Relevant Medications    azelastine (ASTELIN) 0.1 % nasal spray    ipratropium (ATROVENT) 0.06 % nasal spray    Chronic sinusitis    Relevant Medications    azelastine (ASTELIN) 0.1 % nasal spray    ipratropium (ATROVENT) 0.06 % nasal spray    Sommer bullosa    FAUSTO (obstructive sleep  apnea)       Digestive    GERD (gastroesophageal reflux disease)       Other    PND (post-nasal drip)    Relevant Medications    azelastine (ASTELIN) 0.1 % nasal spray    ipratropium (ATROVENT) 0.06 % nasal spray        * Surgery not found *  No orders of the defined types were placed in this encounter.    Return in about 1 year (around 7/23/2021) for Recheck SINUSES.       Patient Instructions   Continue treatment as directed, if symptoms develop call for sooner appointment, otherwise follow-up as directed.    Discussed dairy elimination and dietary changes for reflux.    The patient understands and agrees with assessment and plan.

## 2020-07-23 ENCOUNTER — OFFICE VISIT (OUTPATIENT)
Dept: OTOLARYNGOLOGY | Facility: CLINIC | Age: 70
End: 2020-07-23

## 2020-07-23 VITALS
WEIGHT: 171.8 LBS | HEIGHT: 63 IN | TEMPERATURE: 97.7 F | BODY MASS INDEX: 30.44 KG/M2 | HEART RATE: 72 BPM | DIASTOLIC BLOOD PRESSURE: 87 MMHG | SYSTOLIC BLOOD PRESSURE: 148 MMHG

## 2020-07-23 DIAGNOSIS — R09.82 PND (POST-NASAL DRIP): ICD-10-CM

## 2020-07-23 DIAGNOSIS — J32.9 CHRONIC SINUSITIS, UNSPECIFIED LOCATION: ICD-10-CM

## 2020-07-23 DIAGNOSIS — K21.9 GASTROESOPHAGEAL REFLUX DISEASE, ESOPHAGITIS PRESENCE NOT SPECIFIED: ICD-10-CM

## 2020-07-23 DIAGNOSIS — J34.89 CONCHA BULLOSA: ICD-10-CM

## 2020-07-23 DIAGNOSIS — J30.1 ALLERGIC RHINITIS DUE TO POLLEN, UNSPECIFIED SEASONALITY: Primary | ICD-10-CM

## 2020-07-23 DIAGNOSIS — G47.33 OSA (OBSTRUCTIVE SLEEP APNEA): ICD-10-CM

## 2020-07-23 PROCEDURE — 99214 OFFICE O/P EST MOD 30 MIN: CPT | Performed by: PHYSICIAN ASSISTANT

## 2020-07-23 RX ORDER — AZELASTINE 1 MG/ML
2 SPRAY, METERED NASAL 2 TIMES DAILY
Qty: 90 ML | Refills: 3 | Status: SHIPPED | OUTPATIENT
Start: 2020-07-23 | End: 2021-04-01

## 2020-07-23 RX ORDER — MONTELUKAST SODIUM 10 MG/1
10 TABLET ORAL NIGHTLY
COMMUNITY
Start: 2020-05-19 | End: 2021-08-03 | Stop reason: SDUPTHER

## 2020-07-23 RX ORDER — IPRATROPIUM BROMIDE 42 UG/1
2 SPRAY, METERED NASAL 2 TIMES DAILY
Qty: 45 ML | Refills: 3 | Status: SHIPPED | OUTPATIENT
Start: 2020-07-23 | End: 2021-08-03 | Stop reason: SDUPTHER

## 2020-07-23 NOTE — PATIENT INSTRUCTIONS
Continue treatment as directed, if symptoms develop call for sooner appointment, otherwise follow-up as directed.    Discussed dairy elimination and dietary changes for reflux.    The patient understands and agrees with assessment and plan.

## 2020-07-25 ENCOUNTER — OFFICE VISIT (OUTPATIENT)
Dept: URGENT CARE | Age: 70
End: 2020-07-25
Payer: MEDICARE

## 2020-07-25 VITALS
WEIGHT: 170 LBS | BODY MASS INDEX: 30.12 KG/M2 | DIASTOLIC BLOOD PRESSURE: 90 MMHG | SYSTOLIC BLOOD PRESSURE: 149 MMHG | HEART RATE: 70 BPM | TEMPERATURE: 97.6 F | OXYGEN SATURATION: 98 % | HEIGHT: 63 IN

## 2020-07-25 DIAGNOSIS — N89.8 VAGINAL ITCHING: ICD-10-CM

## 2020-07-25 DIAGNOSIS — N30.00 ACUTE CYSTITIS WITHOUT HEMATURIA: ICD-10-CM

## 2020-07-25 LAB
BILIRUBIN, POC: NEGATIVE
BLOOD URINE, POC: ABNORMAL
CLARITY, POC: CLEAR
COLOR, POC: YELLOW
GLUCOSE URINE, POC: NEGATIVE
KETONES, POC: NEGATIVE
LEUKOCYTE EST, POC: ABNORMAL
NITRITE, POC: NEGATIVE
PH, POC: 6
PROTEIN, POC: NEGATIVE
SPECIFIC GRAVITY, POC: 1.01
UROBILINOGEN, POC: 0.2

## 2020-07-25 PROCEDURE — G8417 CALC BMI ABV UP PARAM F/U: HCPCS | Performed by: NURSE PRACTITIONER

## 2020-07-25 PROCEDURE — G8399 PT W/DXA RESULTS DOCUMENT: HCPCS | Performed by: NURSE PRACTITIONER

## 2020-07-25 PROCEDURE — 99214 OFFICE O/P EST MOD 30 MIN: CPT | Performed by: NURSE PRACTITIONER

## 2020-07-25 PROCEDURE — 3017F COLORECTAL CA SCREEN DOC REV: CPT | Performed by: NURSE PRACTITIONER

## 2020-07-25 PROCEDURE — 81002 URINALYSIS NONAUTO W/O SCOPE: CPT | Performed by: NURSE PRACTITIONER

## 2020-07-25 PROCEDURE — 4040F PNEUMOC VAC/ADMIN/RCVD: CPT | Performed by: NURSE PRACTITIONER

## 2020-07-25 PROCEDURE — 1123F ACP DISCUSS/DSCN MKR DOCD: CPT | Performed by: NURSE PRACTITIONER

## 2020-07-25 PROCEDURE — G8427 DOCREV CUR MEDS BY ELIG CLIN: HCPCS | Performed by: NURSE PRACTITIONER

## 2020-07-25 PROCEDURE — 1090F PRES/ABSN URINE INCON ASSESS: CPT | Performed by: NURSE PRACTITIONER

## 2020-07-25 PROCEDURE — 1036F TOBACCO NON-USER: CPT | Performed by: NURSE PRACTITIONER

## 2020-07-25 RX ORDER — METRONIDAZOLE 500 MG/1
500 TABLET ORAL 2 TIMES DAILY
Qty: 14 TABLET | Refills: 0 | Status: SHIPPED | OUTPATIENT
Start: 2020-07-25 | End: 2020-08-01

## 2020-07-25 RX ORDER — METRONIDAZOLE 7.5 MG/G
1 GEL VAGINAL DAILY
Qty: 1 TUBE | Refills: 0 | Status: SHIPPED | OUTPATIENT
Start: 2020-07-25 | End: 2020-07-30

## 2020-07-25 ASSESSMENT — ENCOUNTER SYMPTOMS: ABDOMINAL PAIN: 1

## 2020-07-25 NOTE — PATIENT INSTRUCTIONS
vaginal bleeding. · You have a fever. · You have new or increased pain in your vagina or pelvis. · You are not getting better after 1 week. · Your symptoms return after you finish the course of your medicine. Where can you learn more? Go to https://yunior.health-partners. org and sign in to your [employer] account. Enter C808 in the [employer] box to learn more about \"Bacterial Vaginosis: Care Instructions. \"     If you do not have an account, please click on the \"Sign Up Now\" link. Current as of: November 8, 2019               Content Version: 12.5  © 8861-9687 [employer]. Care instructions adapted under license by [facility] ([facility]). If you have questions about a medical condition or this instruction, always ask your healthcare professional. [address] any warranty or liability for your use of this information. Patient Education        Vaginitis: Care Instructions  Your Care Instructions     Vaginitis is soreness or infection of the vagina. This common problem can cause itching and burning. And it can cause a change in vaginal discharge. Sometimes it can cause pain during sex. Vaginitis may be caused by bacteria, yeast, or other germs. Some infections that cause it are caught from a sexual partner. Bath products, spermicides, and douches can irritate the vagina too. Some women have this problem during and after menopause. A drop in estrogen levels during this time can cause dryness, soreness, and pain during sex. Your doctor can give you medicine to treat an infection. And home care may help you feel better. For certain types of infections, your sex partner must be treated too. Follow-up care is a key part of your treatment and safety. Be sure to make and go to all appointments, and call your doctor if you are having problems. It's also a good idea to know your test results and keep a list of the medicines you take.   How can you care for yourself at home? · If your doctor prescribed antibiotics, take them as directed. Do not stop taking them just because you feel better. You need to take the full course of antibiotics. · Take your medicines exactly as prescribed. Call your doctor if you think you are having a problem with your medicine. · Do not eat or drink anything that has alcohol if you are taking metronidazole (Flagyl). · If you have a yeast infection, use over-the-counter products as your doctor tells you to. Or take medicine your doctor prescribes exactly as directed. · Wash your vaginal area daily with water. You also can use a mild, unscented soap if you want. · Do not use scented bath products. And do not use vaginal sprays or douches. · Put a washcloth soaked in cool water on the area to relieve itching. Or you can take cool baths. · If you have dryness because of menopause, use estrogen cream or pills that your doctor prescribes. · Ask your doctor about when it is okay to have sex. · Use a personal lubricant before sex if you have dryness. Examples are Astroglide, K-Y Jelly, and Wet Lubricant Gel. · Ask your doctor if your sex partner also needs treatment. When should you call for help? Call your doctor now or seek immediate medical care if:  · You have a fever and pelvic pain. Watch closely for changes in your health, and be sure to contact your doctor if:  · You have bleeding other than your period. · You do not get better as expected. Where can you learn more? Go to https://[facility] Laboratories[facility]ta.healthGRIN Publishing. org and sign in to your "[employer] account. Enter K391 in the [facility] box to learn more about \"Vaginitis: Care Instructions. \"     If you do not have an account, please click on the \"Sign Up Now\" link. Current as of: November 8, 2019               Content Version: 12.5  © 6678-3512 [employer]wise[employer] Incorporated. Care instructions adapted under license by [facility] ([facility]).  If you have questions about a medical condition pads often. · Avoid douches, bubble baths, feminine hygiene sprays, and other feminine hygiene products that have deodorants. · After going to the bathroom, wipe from front to back. When should you call for help? Call your doctor now or seek immediate medical care if:  · Symptoms such as fever, chills, nausea, or vomiting get worse or appear for the first time. · You have new pain in your back just below your rib cage. This is called flank pain. · There is new blood or pus in your urine. · You have any problems with your antibiotic medicine. Watch closely for changes in your health, and be sure to contact your doctor if:  · You are not getting better after taking an antibiotic for 2 days. · Your symptoms go away but then come back. Where can you learn more? Go to https://chnyaeb.[employer]. org and sign in to your [employer] account. Enter J401 in the [employer] box to learn more about \"Urinary Tract Infection in Women: Care Instructions. \"     If you do not have an account, please click on the \"Sign Up Now\" link. Current as of: August 22, 2019               Content Version: 12.5  © 3101-6286 [employer]. Care instructions adapted under license by [facility] ([facility]). If you have questions about a medical condition or this instruction, always ask your healthcare professional. [address] any warranty or liability for your use of this information. You are being treated for Bacterial Vaginitis. If your urine culture reveals anything that requires treatment for UTI you will be contacted. Take all oral medication until gone. If symptoms due not improve with treatment, please contact the office.

## 2020-07-25 NOTE — PROGRESS NOTES
2020     Prashanth Lozano (:  1950) is a 79 y.o. female, here for evaluation of the following medical concerns:    Abdominal Pain   This is a new problem. The current episode started in the past 7 days. The onset quality is gradual. The problem occurs constantly. The problem has been gradually worsening. The pain is located in the suprapubic region. The quality of the pain is dull, cramping and burning. Associated symptoms include frequency. Pertinent negatives include no dysuria, fever or hematuria. The pain is aggravated by movement. Vaginal Itching   The patient's primary symptoms include genital itching and vaginal discharge (thick yellow). The patient's pertinent negatives include no vaginal bleeding. The problem occurs constantly. Associated symptoms include abdominal pain and frequency. Pertinent negatives include no chills, dysuria, fever or hematuria. The vaginal discharge was thick and yellow. There has been no bleeding. Nothing aggravates the symptoms. Review of Systems   Constitutional: Negative for chills and fever. Gastrointestinal: Positive for abdominal pain. Genitourinary: Positive for frequency and vaginal discharge (thick yellow). Negative for dysuria and hematuria. Prior to Visit Medications    Medication Sig Taking?  Authorizing Provider   metroNIDAZOLE (FLAGYL) 500 MG tablet Take 1 tablet by mouth 2 times daily for 7 days Yes PAUL Burton CNP   metroNIDAZOLE (METROGEL) 0.75 % vaginal gel Place 1 Applicatorful vaginally daily for 5 days Yes PAUL Burton CNP   glimepiride (AMARYL) 1 MG tablet TAKE 1 TABLET EVERY MORNING BEFORE BREAKFAST Yes PAUL Nicholas   FRANCHESCA CONTOUR TEST strip TEST BLOOD SUGAR DAILY AND AS NEEDED Yes Grace Palmer DO   levothyroxine (SYNTHROID) 125 MCG tablet Take 1 tablet by mouth Daily Yes PAUL Nicholas   metoprolol succinate (TOPROL XL) 25 MG extended release tablet Take 1 tablet by mouth daily Yes PAUL Sanchez   atorvastatin (LIPITOR) 40 MG tablet TAKE 1 TABLET EVERY NIGHT Yes Robi Awad DO   hydrochlorothiazide (MICROZIDE) 12.5 MG capsule Take 1 capsule by mouth daily Yes Historical Provider, MD   losartan (COZAAR) 50 MG tablet Take 2 tablets by mouth daily Yes Historical Provider, MD   HYDROcodone-acetaminophen (NORCO)  MG per tablet Take 1 tablet by mouth every 8 hours as needed for Pain (may fill 7/23/18) for up to 30 days. PAUL Geiger CNP   alendronate (FOSAMAX) 70 MG tablet Take 70 mg by mouth Yes Historical Provider, MD   Misc. Devices (WRIST BRACE) MISC Bilateral carpal tunnel brace Yes Cherylene Macadam, MD   allopurinol (ZYLOPRIM) 100 MG tablet Take 1 tablet by mouth daily Yes Robi Awad DO   vitamin D (ERGOCALCIFEROL) 27934 units CAPS capsule Take 1 capsule by mouth once a week Yes Robi Awad DO   fluticasone (FLONASE) 50 MCG/ACT nasal spray USE 2 SPRAYS NASALLY EVERY DAY Yes PAUL Sequeira   azelastine (ASTELIN) 0.1 % nasal spray 2 sprays by Nasal route daily Yes Historical Provider, MD   pantoprazole (PROTONIX) 40 MG tablet Take 1 tablet by mouth daily Yes PAUL Sequeira   gabapentin (NEURONTIN) 300 MG capsule Take 1 capsule by mouth 2 times daily for 90 days. 3 month supply. PAUL Rodas CNP   topiramate (TOPAMAX) 100 MG tablet Take 1 tablet by mouth 2 times daily  Robi Awad DO        Social History     Tobacco Use    Smoking status: Never Smoker    Smokeless tobacco: Never Used   Substance Use Topics    Alcohol use: Yes     Comment: occasionally        Vitals:    07/25/20 1321   BP: (!) 149/90   Pulse: 70   Temp: 97.6 °F (36.4 °C)   SpO2: 98%   Weight: 170 lb (77.1 kg)   Height: 5' 3\" (1.6 m)     Estimated body mass index is 30.11 kg/m² as calculated from the following:    Height as of this encounter: 5' 3\" (1.6 m). Weight as of this encounter: 170 lb (77.1 kg).     Physical Exam  Vitals signs and nursing note reviewed. Constitutional:       General: She is not in acute distress. Appearance: She is well-developed. HENT:      Head: Normocephalic. Right Ear: External ear normal.      Left Ear: External ear normal.      Nose: Nose normal.   Eyes:      Conjunctiva/sclera: Conjunctivae normal.      Pupils: Pupils are equal, round, and reactive to light. Neck:      Vascular: No JVD. Trachea: No tracheal deviation. Cardiovascular:      Rate and Rhythm: Normal rate. Pulmonary:      Effort: Pulmonary effort is normal.   Abdominal:      General: There is no distension. Tenderness: There is abdominal tenderness in the suprapubic area. Genitourinary:     Vagina: Vaginal discharge (whitsh gray) present. Skin:     General: Skin is warm and dry. Neurological:      Mental Status: She is alert and oriented to person, place, and time. Cranial Nerves: No cranial nerve deficit. Psychiatric:         Behavior: Behavior normal.         Thought Content: Thought content normal.         Judgment: Judgment normal.         ASSESSMENT/PLAN:  1. Lower abdominal pain    - POCT Urinalysis no Micro    2. Bacterial vaginitis    - metroNIDAZOLE (FLAGYL) 500 MG tablet; Take 1 tablet by mouth 2 times daily for 7 days  Dispense: 14 tablet; Refill: 0  - metroNIDAZOLE (METROGEL) 0.75 % vaginal gel; Place 1 Applicatorful vaginally daily for 5 days  Dispense: 1 Tube; Refill: 0    3. Vaginal itching    - metroNIDAZOLE (METROGEL) 0.75 % vaginal gel; Place 1 Applicatorful vaginally daily for 5 days  Dispense: 1 Tube; Refill: 0  - Culture, Urine; Future    4. Acute cystitis without hematuria    - Culture, Urine; Future    Patient is diabetic. She has external itching and whitish gray thick discharge on exam. Diatherix swab obtained. Urine sample was + for leuks, neg for nitrates, and trace of blood. Urine culture sent. Patient not treated for UTI until results due to high suspension of BV.      Discharge instructions: You are being treated for Bacterial Vaginitis. If your urine culture reveals anything that requires treatment for UTI you will be contacted. Take all oral medication until gone. If symptoms due not improve with treatment, please contact the office. No follow-ups on file. An electronic signature was used to authenticate this note.     --Patti Silva, APRN - CNP on 7/25/2020 at 2:46 PM

## 2020-07-27 LAB — URINE CULTURE, ROUTINE: NORMAL

## 2020-07-30 ENCOUNTER — TELEPHONE (OUTPATIENT)
Dept: URGENT CARE | Age: 70
End: 2020-07-30

## 2020-07-30 RX ORDER — DOXYCYCLINE 100 MG/1
100 CAPSULE ORAL 2 TIMES DAILY
Qty: 20 CAPSULE | Refills: 0 | Status: SHIPPED | OUTPATIENT
Start: 2020-07-30 | End: 2020-08-09

## 2020-11-11 ENCOUNTER — TRANSCRIBE ORDERS (OUTPATIENT)
Dept: ADMINISTRATIVE | Facility: HOSPITAL | Age: 70
End: 2020-11-11

## 2020-11-11 DIAGNOSIS — Z01.818 PREOP TESTING: Primary | ICD-10-CM

## 2020-11-12 ENCOUNTER — LAB (OUTPATIENT)
Dept: LAB | Facility: HOSPITAL | Age: 70
End: 2020-11-12

## 2020-11-12 PROCEDURE — C9803 HOPD COVID-19 SPEC COLLECT: HCPCS | Performed by: ANESTHESIOLOGY

## 2020-11-12 PROCEDURE — U0003 INFECTIOUS AGENT DETECTION BY NUCLEIC ACID (DNA OR RNA); SEVERE ACUTE RESPIRATORY SYNDROME CORONAVIRUS 2 (SARS-COV-2) (CORONAVIRUS DISEASE [COVID-19]), AMPLIFIED PROBE TECHNIQUE, MAKING USE OF HIGH THROUGHPUT TECHNOLOGIES AS DESCRIBED BY CMS-2020-01-R: HCPCS | Performed by: ANESTHESIOLOGY

## 2020-11-13 LAB
COVID LABCORP PRIORITY: NORMAL
SARS-COV-2 RNA RESP QL NAA+PROBE: NOT DETECTED

## 2020-11-17 ENCOUNTER — TRANSCRIBE ORDERS (OUTPATIENT)
Dept: ADMINISTRATIVE | Facility: HOSPITAL | Age: 70
End: 2020-11-17

## 2020-11-17 DIAGNOSIS — Z01.818 PREOP TESTING: Primary | ICD-10-CM

## 2020-11-25 ENCOUNTER — NURSE TRIAGE (OUTPATIENT)
Dept: CALL CENTER | Facility: HOSPITAL | Age: 70
End: 2020-11-25

## 2020-11-25 NOTE — TELEPHONE ENCOUNTER
"Caller states [employer] is asking for a new order for her CPAP supplies. Will call back on Friday.    Reason for Disposition  • [1] Other NON-URGENT information for PCP AND [2] does not require PCP response    Additional Information  • Negative: Lab calling with strep throat test results and triager can call in prescription  • Negative: Lab calling with urinalysis test results and triager can call in prescription  • Negative: Medication questions  • Negative: ED call to PCP  • Negative: Physician call to PCP  • Negative: Call about patient who is currently hospitalized  • Negative: Lab or radiology calling with CRITICAL test results  • Negative: [1] Prescription not at pharmacy AND [2] was prescribed today by PCP  • Negative: [1] Follow-up call from patient regarding patient's clinical status AND [2] information urgent  • Negative: [1] Caller requests to speak ONLY to PCP AND [2] URGENT question  • Negative: [1] Caller requests to speak to PCP now AND [2] won't tell us reason for call  (Exception: if 10 pm to 6 am, caller must first discuss reason for the call)  • Negative: Notification of hospital admission  • Negative: Notification of death  • Negative: Caller requesting lab results  • Negative: Lab or radiology calling with test results  • Negative: [1] Follow-up call from patient regarding patient's clinical status AND [2] information NON-URGENT  • Negative: [1] Caller requests to speak ONLY to PCP AND [2] NON-URGENT question  • Negative: Caller requesting an appointment, triage offered and declined    Answer Assessment - Initial Assessment Questions  1. REASON FOR CALL or QUESTION: \"What is your reason for calling today?\" or \"How can I best  help you?\" or \"What question do you have that I can help answer?\"      Caller says [facility] [employer] is asking for a new order for her CPAP supplies  2. CALLER: Document the source of call. (e.g., laboratory, patient).      patient    Protocols used: " PCP CALL - NO TRIAGE-ADULT-AH

## 2020-11-28 ENCOUNTER — LAB (OUTPATIENT)
Dept: LAB | Facility: HOSPITAL | Age: 70
End: 2020-11-28

## 2020-11-28 PROCEDURE — U0003 INFECTIOUS AGENT DETECTION BY NUCLEIC ACID (DNA OR RNA); SEVERE ACUTE RESPIRATORY SYNDROME CORONAVIRUS 2 (SARS-COV-2) (CORONAVIRUS DISEASE [COVID-19]), AMPLIFIED PROBE TECHNIQUE, MAKING USE OF HIGH THROUGHPUT TECHNOLOGIES AS DESCRIBED BY CMS-2020-01-R: HCPCS | Performed by: ANESTHESIOLOGY

## 2020-11-28 PROCEDURE — C9803 HOPD COVID-19 SPEC COLLECT: HCPCS | Performed by: ANESTHESIOLOGY

## 2020-11-29 LAB
COVID LABCORP PRIORITY: NORMAL
SARS-COV-2 RNA RESP QL NAA+PROBE: NOT DETECTED

## 2020-12-06 ENCOUNTER — HOSPITAL ENCOUNTER (OUTPATIENT)
Dept: GENERAL RADIOLOGY | Facility: HOSPITAL | Age: 70
Discharge: HOME | End: 2020-12-06
Admitting: NURSE PRACTITIONER

## 2020-12-06 PROCEDURE — 74018 RADEX ABDOMEN 1 VIEW: CPT

## 2020-12-16 NOTE — PROGRESS NOTES
[facility] - PODIATRY    Today's Date: 12/18/20    Patient Name: Camila Wallace  MRN: [id number]  CSN: [id number]  PCP: Sandra Wells PA  Referring Provider: No ref. provider found    SUBJECTIVE     Chief Complaint   Patient presents with   • Follow-up     pt is here diabetic foot and nail care - pt c/o calluses, long, toenails ,bilateral foot pain - pt denies any pain at this moment  - pcp 02/26/2020   • Diabetes     pt is unaware of the last blood sugar reading      HPI: Camila Wallace, a 70 y.o.female, comes to clinic as a(n) established patient complaining of painful toenails and calluses. Patient has h/o farida bullosa, thyroid disease, GERD, Graves Disease, HLD, HTN, FAUSTO, DM2. Patient is NIDDM and unsure of last BG level. Relates only occasional numbness/tingling in feet. Denies open wounds or sores. States that her toenails are long, thick and discolored. Relates some tenderness to right hallux due to callus. Denies pain today. Relates previous treatment(s) including care by podiatry. Denies any constitutional symptoms. No other pedal complaints at this time.    Past Medical History:   Diagnosis Date   • Allergic rhinitis    • Chronic sinusitis    • Farida bullosa    • Disease of thyroid gland    • Diverticulosis 2013   • GERD (gastroesophageal reflux disease)    • Graves disease    • HLD (hyperlipidemia)    • HTN (hypertension)    • Obstructive sleep apnea    • Type 2 diabetes mellitus (CMS/HCC)    • Vasomotor rhinitis      Past Surgical History:   Procedure Laterality Date   • CHOLECYSTECTOMY     • COLONOSCOPY  10/29/2013    diverticulosis   • COLONOSCOPY N/A 7/10/2018    Procedure: COLONOSCOPY WITH ANESTHESIA;  Surgeon: Donald Menchaca DO;  Location: [facility] ENDOSCOPY;  Service: Gastroenterology   • FOOT SURGERY     • TUBAL ABDOMINAL LIGATION     • TYMPANIC MEMBRANE REPAIR       Family History   Problem Relation Age of Onset   • Diabetes Mother    • Heart disease Mother    •  Hypertension Mother    • Hypertension Father    • Diabetes Brother    • Hypertension Brother    • Hyperlipidemia Brother    • Colon polyps Brother    • Hyperlipidemia Maternal Uncle    • Thyroid disease Maternal Uncle    • Cancer Maternal Grandmother    • Colon polyps Daughter      Social History     Socioeconomic History   • Marital status:      Spouse name: Not on file   • Number of children: Not on file   • Years of education: Not on file   • Highest education level: Not on file   Tobacco Use   • Smoking status: Never Smoker   • Smokeless tobacco: Never Used   Substance and Sexual Activity   • Alcohol use: Yes     Comment: occasionally   • Drug use: No   • Sexual activity: Defer     Allergies   Allergen Reactions   • Metformin And Related Swelling     Current Outpatient Medications   Medication Sig Dispense Refill   • alendronate (FOSAMAX) 70 MG tablet Take 70 mg by mouth Every 7 (Seven) Days.     • allopurinol (ZYLOPRIM) 100 MG tablet Take 100 mg by mouth Daily.     • atorvastatin (LIPITOR) 40 MG tablet Take 40 mg by mouth Daily.     • azelastine (ASTELIN) 0.1 % nasal spray 2 sprays into the nostril(s) as directed by provider 2 (Two) Times a Day. Use in each nostril as directed 90 mL 3   • cetirizine (zyrTEC) 10 MG tablet Take 1 tablet by mouth At Night As Needed for Allergies (sore throat and cough). 30 tablet 0   • cloNIDine (CATAPRES) 0.1 MG tablet Take 1 tablet by mouth Every 6 (Six) Hours As Needed for High Blood Pressure (for blood pressure >160/110). 30 tablet 0   • Glucose Blood (BLOOD GLUCOSE TEST) strip Test blood sugar daily and prn     • HYDROcodone-acetaminophen (NORCO)  MG per tablet TAKE 1 TABLET BY MOUTH THREE TIMES A DAY  EFFECTIVE DATE 3 21 19  0   • ipratropium (ATROVENT) 0.06 % nasal spray 2 sprays into the nostril(s) as directed by provider 2 (Two) Times a Day. 45 mL 3   • lidocaine-prilocaine (EMLA) 2.5-2.5 % cream Apply  topically to the appropriate area as directed As Needed  for Mild Pain .     • losartan (COZAAR) 50 MG tablet Take 50 mg by mouth Daily.     • metoprolol succinate XL (TOPROL-XL) 50 MG 24 hr tablet Take 50 mg by mouth Daily.     • montelukast (SINGULAIR) 10 MG tablet Take 10 mg by mouth Every Night.     • nystatin (MYCOSTATIN) 029831 UNIT/GM powder Apply  topically to the appropriate area as directed 3 (Three) Times a Day. 30 g 0   • pantoprazole (PROTONIX) 40 MG EC tablet Take 40 mg by mouth Daily.     • SYNTHROID 100 MCG tablet Take 112 mcg by mouth Daily.     • topiramate (TOPAMAX) 100 MG tablet Take 100 mg by mouth 2 (Two) Times a Day.     • vitamin D (ERGOCALCIFEROL) 18521 UNITS capsule capsule Take 50,000 Units by mouth 1 (One) Time Per Week.       No current facility-administered medications for this visit.      Review of Systems   Constitutional: Negative for chills and fever.   HENT: Negative for congestion.    Respiratory: Negative for shortness of breath.    Cardiovascular: Negative for chest pain and leg swelling.   Gastrointestinal: Negative for constipation, diarrhea, nausea and vomiting.   Skin: Negative for wound.   Neurological: Positive for numbness.       OBJECTIVE     Vitals:    12/18/20 1304   BP: 127/67   Pulse: 69   SpO2: 99%       PHYSICAL EXAM  GEN:   Accompanied by none.     Foot/Ankle Exam:       General:   Diabetic Foot Exam Performed    Appearance: appears stated age and healthy    Orientation: AAOx3    Affect: appropriate    Gait: unimpaired    Assistance: independent    Shoe Gear:  Casual shoes    VASCULAR      Right Foot Vascularity   Dorsalis pedis:  2+  Posterior tibial:  2+  Skin Temperature: warm    Edema Grading:  None  CFT:  3  Pedal Hair Growth:  Present  Varicosities: none       Left Foot Vascularity   Dorsalis pedis:  2+  Posterior tibial:  2+  Skin Temperature: warm    Edema Grading:  None  CFT:  3  Pedal Hair Growth:  Present  Varicosities: none        NEUROLOGIC     Right Foot Neurologic   Light touch sensation:   Diminished  Vibratory sensation:  Normal  Hot/Cold sensation: normal    Protective Sensation using Elora-Narcisa Monofilament:  10     Left Foot Neurologic   Light touch sensation:  Diminished  Vibratory sensation:  Normal  Hot/cold sensation: normal    Protective Sensation using Elora-Narcisa Monofilament:  10     MUSCULOSKELETAL      Right Foot Musculoskeletal   Ecchymosis:  None  Tenderness: right foot callus    Arch:  Normal  Hammertoe:  Fifth toe  Hallux valgus: Yes    Hallux limitus: No       Left Foot Musculoskeletal   Ecchymosis:  None  Tenderness: left foot callus    Arch:  Normal  Hammertoe:  Fifth toe  Hallux valgus: No    Hallux limitus: Yes (s/p fusion)       MUSCLE STRENGTH     Right Foot Muscle Strength   Foot dorsiflexion:  5  Foot plantar flexion:  5  Foot inversion:  5  Foot eversion:  5     Left Foot Muscle Strength   Foot dorsiflexion:  5  Foot plantar flexion:  5  Foot inversion:  5  Foot eversion:  5     RANGE OF MOTION      Right Foot Range of Motion   Foot and ankle ROM within normal limits       Left Foot Range of Motion   Foot and ankle ROM within normal limits    1st MTP extension:  0  1st MTP flexion:  0     DERMATOLOGIC     Right Foot Dermatologic   Skin: corn (Plantar medial HIPJ.)    Nails: onychomycosis, abnormally thick, subungual debris and dystrophic nails       Left Foot Dermatologic   Skin: corn ( Plantar medial HIPJ)    Nails: onychomycosis, abnormally thick, subungual debris and dystrophic nails       Image:       RADIOLOGY/NUCLEAR:  Xr Abdomen Kub    Result Date: 12/6/2020  Narrative: EXAMINATION: XR ABDOMEN KUB- 12/6/2020 1:22 PM CST  HISTORY: right mid abdominal pain; R10.30-Lower abdominal pain, unspecified.  REPORT: A supine view of the abdomen was obtained.  COMPARISON: There are no correlative imaging studies for comparison.  The bowel gas pattern is within normal limits. There are surgical clips in the left lower abdomen. No definite urinary tract calculi are  identified. There is are scattered phleboliths in the pelvis. Moderate degenerative changes are seen throughout the lumbar spine.      Impression: No acute intra-abdominal abnormality. This report was finalized on 12/06/2020 13:23 by Dr. Tushar Brush MD.      LABORATORY/CULTURE RESULTS:      PATHOLOGY RESULTS:       ASSESSMENT/PLAN     Diagnoses and all orders for this visit:    1. Onychomycosis (Primary)    2. Type 2 diabetes mellitus without complication, without long-term current use of insulin (CMS/[facility])    3. Foot pain, bilateral    4. Hammer toes of both feet      Comprehensive lower extremity examination and evaluation was performed.  Discussed findings and treatment plan including risks, benefits, and treatment options with patient in detail. Patient agreed with treatment plan.  After verbal consent obtained, nail(s) x10 debrided of length and thickness with nail nipper without incidence  After verbal consent obtained, calluses x2 pared utilizing dermal curette and/or scalpel without incidence  Patient may maintain nails and calluses at home utilizing emery board or pumice stone between visits as needed  Reviewed at home diabetic foot care including daily foot checks   Continue diabetic monitoring and control under direction of PCP   An After Visit Summary was printed and given to the patient at discharge, including (if requested) any available informative/educational handouts regarding diagnosis, treatment, or medications. All questions were answered to patient/family satisfaction. Should symptoms fail to improve or worsen they agree to call or return to clinic or to go to the Emergency Department. Discussed the importance of following up with any needed screening tests/labs/specialist appointments and any requested follow-up recommended by me today. Importance of maintaining follow-up discussed and patient accepts that missed appointments can delay diagnosis and potentially lead to worsening of  conditions.  Return in about 3 months (around 3/18/2021)., or sooner if acute issues arise.    Lab Frequency Next Occurrence       This document has been electronically signed by TONEY Connor on December 18, 2020 13:26 CST

## 2020-12-17 ENCOUNTER — TELEPHONE (OUTPATIENT)
Dept: VASCULAR SURGERY | Facility: CLINIC | Age: 70
End: 2020-12-17

## 2020-12-17 NOTE — TELEPHONE ENCOUNTER
Tried calling to reminding Mrs Wallace of her appointment for Friday, December 18th, 2020 at 115 pm with Avery ZIEGLER. When calling it rang several times with no answer.

## 2020-12-18 ENCOUNTER — OFFICE VISIT (OUTPATIENT)
Dept: PODIATRY | Facility: CLINIC | Age: 70
End: 2020-12-18

## 2020-12-18 VITALS
WEIGHT: 168 LBS | DIASTOLIC BLOOD PRESSURE: 67 MMHG | BODY MASS INDEX: 29.77 KG/M2 | SYSTOLIC BLOOD PRESSURE: 127 MMHG | HEIGHT: 63 IN | HEART RATE: 69 BPM | OXYGEN SATURATION: 99 %

## 2020-12-18 DIAGNOSIS — M20.41 HAMMER TOES OF BOTH FEET: ICD-10-CM

## 2020-12-18 DIAGNOSIS — B35.1 ONYCHOMYCOSIS: Primary | ICD-10-CM

## 2020-12-18 DIAGNOSIS — M20.42 HAMMER TOES OF BOTH FEET: ICD-10-CM

## 2020-12-18 DIAGNOSIS — E11.9 TYPE 2 DIABETES MELLITUS WITHOUT COMPLICATION, WITHOUT LONG-TERM CURRENT USE OF INSULIN (HCC): ICD-10-CM

## 2020-12-18 DIAGNOSIS — M79.672 FOOT PAIN, BILATERAL: ICD-10-CM

## 2020-12-18 DIAGNOSIS — M79.671 FOOT PAIN, BILATERAL: ICD-10-CM

## 2020-12-18 PROCEDURE — 11721 DEBRIDE NAIL 6 OR MORE: CPT | Performed by: NURSE PRACTITIONER

## 2020-12-18 PROCEDURE — 11056 PARNG/CUTG B9 HYPRKR LES 2-4: CPT | Performed by: NURSE PRACTITIONER

## 2021-01-23 ENCOUNTER — OFFICE VISIT (OUTPATIENT)
Age: 71
End: 2021-01-23

## 2021-01-23 ENCOUNTER — OFFICE VISIT (OUTPATIENT)
Dept: URGENT CARE | Age: 71
End: 2021-01-23
Payer: MEDICARE

## 2021-01-23 VITALS
SYSTOLIC BLOOD PRESSURE: 122 MMHG | HEART RATE: 84 BPM | TEMPERATURE: 98 F | RESPIRATION RATE: 18 BRPM | WEIGHT: 177.6 LBS | OXYGEN SATURATION: 98 % | HEIGHT: 62 IN | BODY MASS INDEX: 32.68 KG/M2 | DIASTOLIC BLOOD PRESSURE: 79 MMHG

## 2021-01-23 DIAGNOSIS — Z11.59 SCREENING FOR VIRAL DISEASE: ICD-10-CM

## 2021-01-23 DIAGNOSIS — J06.9 UPPER RESPIRATORY TRACT INFECTION, UNSPECIFIED TYPE: Primary | ICD-10-CM

## 2021-01-23 DIAGNOSIS — Z11.59 SCREENING FOR VIRAL DISEASE: Primary | ICD-10-CM

## 2021-01-23 DIAGNOSIS — R05.9 COUGH: ICD-10-CM

## 2021-01-23 PROCEDURE — G8428 CUR MEDS NOT DOCUMENT: HCPCS | Performed by: NURSE PRACTITIONER

## 2021-01-23 PROCEDURE — G8484 FLU IMMUNIZE NO ADMIN: HCPCS | Performed by: NURSE PRACTITIONER

## 2021-01-23 PROCEDURE — 96372 THER/PROPH/DIAG INJ SC/IM: CPT | Performed by: NURSE PRACTITIONER

## 2021-01-23 PROCEDURE — 1036F TOBACCO NON-USER: CPT | Performed by: NURSE PRACTITIONER

## 2021-01-23 PROCEDURE — 99203 OFFICE O/P NEW LOW 30 MIN: CPT | Performed by: NURSE PRACTITIONER

## 2021-01-23 PROCEDURE — G8417 CALC BMI ABV UP PARAM F/U: HCPCS | Performed by: NURSE PRACTITIONER

## 2021-01-23 PROCEDURE — 4040F PNEUMOC VAC/ADMIN/RCVD: CPT | Performed by: NURSE PRACTITIONER

## 2021-01-23 PROCEDURE — G8399 PT W/DXA RESULTS DOCUMENT: HCPCS | Performed by: NURSE PRACTITIONER

## 2021-01-23 PROCEDURE — 1090F PRES/ABSN URINE INCON ASSESS: CPT | Performed by: NURSE PRACTITIONER

## 2021-01-23 PROCEDURE — 3017F COLORECTAL CA SCREEN DOC REV: CPT | Performed by: NURSE PRACTITIONER

## 2021-01-23 PROCEDURE — 1123F ACP DISCUSS/DSCN MKR DOCD: CPT | Performed by: NURSE PRACTITIONER

## 2021-01-23 RX ORDER — DEXAMETHASONE SODIUM PHOSPHATE 10 MG/ML
10 INJECTION INTRAMUSCULAR; INTRAVENOUS ONCE
Status: COMPLETED | OUTPATIENT
Start: 2021-01-23 | End: 2021-01-23

## 2021-01-23 RX ORDER — METOPROLOL SUCCINATE 50 MG/1
50 TABLET, EXTENDED RELEASE ORAL DAILY
COMMUNITY
Start: 2020-02-26

## 2021-01-23 RX ORDER — IPRATROPIUM BROMIDE 42 UG/1
2 SPRAY, METERED NASAL 2 TIMES DAILY
COMMUNITY
Start: 2020-07-23

## 2021-01-23 RX ORDER — BROMPHENIRAMINE MALEATE, PSEUDOEPHEDRINE HYDROCHLORIDE, AND DEXTROMETHORPHAN HYDROBROMIDE 2; 30; 10 MG/5ML; MG/5ML; MG/5ML
10 SYRUP ORAL NIGHTLY PRN
Qty: 118 ML | Refills: 0 | Status: SHIPPED | OUTPATIENT
Start: 2021-01-23

## 2021-01-23 RX ORDER — MONTELUKAST SODIUM 10 MG/1
10 TABLET ORAL NIGHTLY
COMMUNITY
Start: 2020-05-19

## 2021-01-23 RX ORDER — CLONIDINE HYDROCHLORIDE 0.1 MG/1
0.1 TABLET ORAL EVERY 6 HOURS PRN
COMMUNITY
Start: 2020-04-09

## 2021-01-23 RX ORDER — CETIRIZINE HYDROCHLORIDE 10 MG/1
10 TABLET ORAL NIGHTLY PRN
COMMUNITY
Start: 2020-04-09

## 2021-01-23 RX ADMIN — DEXAMETHASONE SODIUM PHOSPHATE 10 MG: 10 INJECTION INTRAMUSCULAR; INTRAVENOUS at 13:11

## 2021-01-23 ASSESSMENT — ENCOUNTER SYMPTOMS
DIARRHEA: 0
COUGH: 1
CONSTIPATION: 0
EYES NEGATIVE: 1
WHEEZING: 0
CHEST TIGHTNESS: 0
VOMITING: 0
SHORTNESS OF BREATH: 0
NAUSEA: 0
SORE THROAT: 0

## 2021-01-23 NOTE — PATIENT INSTRUCTIONS
Bromfed sent to pharmacy; use as needed at night for coughing. You have been tested for coronavirus using a nasopharyngeal swab. You are to quarantine until your test results are back. This typically takes 2-3 days and we will call you with results. 1. Rest and increase water intake. 2. Monitor for fever and treat as needed with over the counter Tylenol/Ibuprofen per package instructions. 3. Treat symptoms such as cough/congestion with over the counter medications. 4. Go to ED if symptoms worsen or if you experience chest pain, shortness of breath, or a fever that is uncontrolled with medication. Patient Education        Cough: Care Instructions  Your Care Instructions     A cough is your body's response to something that bothers your throat or airways. Many things can cause a cough. You might cough because of a cold or the flu, bronchitis, or asthma. Smoking, postnasal drip, allergies, and stomach acid that backs up into your throat also can cause coughs. A cough is a symptom, not a disease. Most coughs stop when the cause, such as a cold, goes away. You can take a few steps at home to cough less and feel better. Follow-up care is a key part of your treatment and safety. Be sure to make and go to all appointments, and call your doctor if you are having problems. It's also a good idea to know your test results and keep a list of the medicines you take. How can you care for yourself at home? · Drink lots of water and other fluids. This helps thin the mucus and soothes a dry or sore throat. Honey or lemon juice in hot water or tea may ease a dry cough. · Take cough medicine as directed by your doctor. · Prop up your head on pillows to help you breathe and ease a dry cough. · Try cough drops to soothe a dry or sore throat. Cough drops don't stop a cough. Medicine-flavored cough drops are no better than candy-flavored drops or hard candy. · Do not smoke. Avoid secondhand smoke.  If you need help quitting, talk to your doctor about stop-smoking programs and medicines. These can increase your chances of quitting for good. When should you call for help? Call 911 anytime you think you may need emergency care. For example, call if:    · You have severe trouble breathing. Call your doctor now or seek immediate medical care if:    · You cough up blood.     · You have new or worse trouble breathing.     · You have a new or higher fever.     · You have a new rash. Watch closely for changes in your health, and be sure to contact your doctor if:    · You cough more deeply or more often, especially if you notice more mucus or a change in the color of your mucus.     · You have new symptoms, such as a sore throat, an earache, or sinus pain.     · You do not get better as expected. Where can you learn more? Go to [email or web address] org and sign in to your [employer] account. Enter D279 in the [employer] box to learn more about \"Cough: Care Instructions. \"     If you do not have an account, please click on the \"Sign Up Now\" link. Current as of: February 24, 2020               Content Version: 12.6  © 7317-0322 [employer]. Care instructions adapted under license by [facility] ([facility]). If you have questions about a medical condition or this instruction, always ask your healthcare professional. [place] [address] any warranty or liability for your use of this information. Patient Education        Learning About Coronavirus [phone])  What is coronavirus (COVID-19)? COVID-19 is a disease caused by a new type of coronavirus. This illness was first found in December 2019. It has since spread worldwide. Coronaviruses are a large group of viruses. They cause the common cold. They also cause more serious illnesses like [place] respiratory syndrome (MERS) and severe acute respiratory syndrome (SARS). COVID-19 is caused by a novel coronavirus.  That means it's a new type that has not been seen in people before. What are the symptoms? Coronavirus (COVID-19) symptoms may include:  · Fever. · Cough. · Trouble breathing. · Chills or repeated shaking with chills. · Muscle pain. · Headache. · Sore throat. · New loss of taste or smell. · Vomiting. · Diarrhea. In severe cases, COVID-19 can cause pneumonia and make it hard to breathe without help from a machine. It can cause death. How is it diagnosed? COVID-19 is diagnosed with a viral test. This may also be called a PCR test or antigen test. It looks for evidence of the virus in your breathing passages or lungs (respiratory system). The test is most often done on a sample from the nose, throat, or lungs. It's sometimes done on a sample of saliva. One way a sample is collected is by putting a long swab into the back of your nose. How is it treated? Mild cases of COVID-19 can be treated at home. Serious cases need treatment in the hospital. Treatment may include medicines to reduce symptoms, plus breathing support such as oxygen therapy or a ventilator. Some people may be placed on their belly to help their oxygen levels. Treatments that may help people who have COVID-19 include:  Antiviral medicines. These medicines treat viral infections. Remdesivir is an example. Immune-based therapy. These medicines help the immune system fight COVID-19. One example is bamlanivimab. It's a monoclonal antibody. Blood thinners. These medicines help prevent blood clots. People with severe illness are at risk for blood clots. How can you protect yourself and others? The best way to protect yourself from getting sick is to:  · Avoid areas where there is an outbreak. · Avoid contact with people who may be infected. · Avoid crowds and try to stay at least 6 feet away from other people. · Wash your hands often, especially after you cough or sneeze. Use soap and water, and scrub for at least 20 seconds.  If soap and water aren't available, use an alcohol-based hand . · Avoid touching your mouth, nose, and eyes. To help avoid spreading the virus to others:  · Stay home if you are sick or have been exposed to the virus. Don't go to school, work, or public areas. And don't use public transportation, ride-shares, or taxis unless you have no choice. · Wear a cloth face cover if you have to go to public areas. · Cover your mouth with a tissue when you cough or sneeze. Then throw the tissue in the trash and wash your hands right away. · If you're sick:  ? Leave your home only if you need to get medical care. But call the doctor's office first so they know you're coming. And wear a face cover. ? Wear the face cover whenever you're around other people. It can help stop the spread of the virus when you cough or sneeze. ? Limit contact with pets and people in your home. If possible, stay in a separate bedroom and use a separate bathroom. ? Clean and disinfect your home every day. Use household  and disinfectant wipes or sprays. Take special care to clean things that you grab with your hands. These include doorknobs, remote controls, phones, and handles on your refrigerator and microwave. And don't forget countertops, tabletops, bathrooms, and computer keyboards. When should you call for help? Call 911 anytime you think you may need emergency care. For example, call if you have life-threatening symptoms, such as:    · You have severe trouble breathing. (You can't talk at all.)     · You have constant chest pain or pressure.     · You are severely dizzy or lightheaded.     · You are confused or can't think clearly.     · Your face and lips have a blue color.     · You pass out (lose consciousness) or are very hard to wake up. Call your doctor now or seek immediate medical care if:    · You have moderate trouble breathing.  (You can't speak a full sentence.)     · You are coughing up blood (more than about 1 teaspoon).     · You have signs of low blood pressure. These include feeling lightheaded; being too weak to stand; and having cold, pale, clammy skin. Watch closely for changes in your health, and be sure to contact your doctor if:    · Your symptoms get worse.     · You are not getting better as expected. Call before you go to the doctor's office. Follow their instructions. And wear a cloth face cover. Current as of: December 18, 2020               Content Version: 12.7  © 2006-2021 [employer]. Care instructions adapted under license by [facility] ([facility]). If you have questions about a medical condition or this instruction, always ask your healthcare professional. [place] [address] any warranty or liability for your use of this information.        Patient Education

## 2021-01-23 NOTE — PROGRESS NOTES
[address] [facility] URGENT CARE  [address] [place] [address] Danitza Rodriguez [address]  Dept: [phone]  Dept Fax: [phone]  Loc: [phone]    Pilar Elizabeth is a 79 y.o. female who presents today for her medical conditions/complaintsas noted below. Pilar Elizabeth is c/o of Cough (X 2 DAYS), Fatigue, and Drainage        HPI:     Cough  This is a new problem. Episode onset: x5 days. The problem has been unchanged. The problem occurs hourly. The cough is productive of sputum (clear). Associated symptoms include headaches and postnasal drip. Pertinent negatives include no chest pain, fever, myalgias, sore throat, shortness of breath or wheezing. The symptoms are aggravated by lying down. Treatments tried: zyrtec, mucinex. The treatment provided no relief. Fatigue  This is a new problem. Episode onset: x5 days. The problem occurs daily. The problem has been unchanged. Associated symptoms include coughing (productive of clear sputum), fatigue and headaches. Pertinent negatives include no chest pain, congestion, fever, myalgias, nausea, numbness, sore throat, vomiting or weakness. Nothing aggravates the symptoms. She has tried rest for the symptoms. The treatment provided no relief.        Past Medical History:   Diagnosis Date    Arthritis     Cataract of both eyes     Chronic back pain     Hyperlipidemia     Hypertension     Hypothyroidism     Lumbosacral spondylosis without myelopathy     Lupus (systemic lupus erythematosus) (HCC)     Neuropathy 12/22/2017    Sickle cell anemia ([facility] [address]     carrier    Type 2 diabetes mellitus without complication, without long-term current use of insulin ([address] 11/12/2018    Type II or unspecified type diabetes mellitus without mention of complication, not stated as uncontrolled      Past Surgical History:   Procedure Laterality Date    BACK SURGERY      lumbar    CHOLECYSTECTOMY      COLONOSCOPY      FOOT SURGERY Left 1/2014    bunion and hammer toe    TUBAL LIGATION      TYMPANOPLASTY         Family History   Problem Relation Age of Onset    High Cholesterol Sister     High Cholesterol Brother     Sickle Cell Anemia Daughter     Sickle Cell Anemia Son        Social History     Tobacco Use    Smoking status: Never Smoker    Smokeless tobacco: Never Used   Substance Use Topics    Alcohol use: Yes     Comment: occasionally      Current Outpatient Medications   Medication Sig Dispense Refill    cetirizine (ZYRTEC) 10 MG tablet Take 10 mg by mouth nightly as needed      cloNIDine (CATAPRES) 0.1 MG tablet Take 0.1 mg by mouth every 6 hours as needed      ipratropium (ATROVENT) 0.06 % nasal spray 2 sprays by Nasal route 2 times daily      montelukast (SINGULAIR) 10 MG tablet Take 10 mg by mouth nightly      metoprolol succinate (TOPROL XL) 50 MG extended release tablet Take 50 mg by mouth daily      brompheniramine-pseudoephedrine-DM (BROMFED DM) 2-30-10 MG/5ML syrup Take 10 mLs by mouth nightly as needed for Cough (be aware can make you drowsy) 118 mL 0    glimepiride (AMARYL) 1 MG tablet TAKE 1 TABLET EVERY MORNING BEFORE BREAKFAST 90 tablet 3    FRANCHESCA CONTOUR TEST strip TEST BLOOD SUGAR DAILY AND AS NEEDED 100 strip 1    levothyroxine (SYNTHROID) 125 MCG tablet Take 1 tablet by mouth Daily 90 tablet 1    metoprolol succinate (TOPROL XL) 25 MG extended release tablet Take 1 tablet by mouth daily 90 tablet 1    atorvastatin (LIPITOR) 40 MG tablet TAKE 1 TABLET EVERY NIGHT 90 tablet 3    hydrochlorothiazide (MICROZIDE) 12.5 MG capsule Take 1 capsule by mouth daily  3    losartan (COZAAR) 50 MG tablet Take 2 tablets by mouth daily  3    HYDROcodone-acetaminophen (NORCO)  MG per tablet Take 1 tablet by mouth every 8 hours as needed for Pain (may fill 7/23/18) for up to 30 days. . 90 tablet 0    gabapentin (NEURONTIN) 300 MG capsule Take 1 capsule by mouth 2 times daily for 90 days. 3 month supply.  180 capsule 0    alendronate (FOSAMAX) 70 MG tablet Take 70 mg by mouth      Misc. Devices (WRIST BRACE) MISC Bilateral carpal tunnel brace 2 each 0    allopurinol (ZYLOPRIM) 100 MG tablet Take 1 tablet by mouth daily 90 tablet 3    vitamin D (ERGOCALCIFEROL) 57982 units CAPS capsule Take 1 capsule by mouth once a week 12 capsule 3    topiramate (TOPAMAX) 100 MG tablet Take 1 tablet by mouth 2 times daily 180 tablet 0    fluticasone (FLONASE) 50 MCG/ACT nasal spray USE 2 SPRAYS NASALLY EVERY DAY 48 g 1    azelastine (ASTELIN) 0.1 % nasal spray 2 sprays by Nasal route daily  3    pantoprazole (PROTONIX) 40 MG tablet Take 1 tablet by mouth daily 90 tablet 3     No current facility-administered medications for this visit. Allergies   Allergen Reactions    Glucophage [Metformin] Swelling    Metformin And Related        Health Maintenance   Topic Date Due    COVID-19 Vaccine (1 of 2) [birth year]/1966    DTaP/Tdap/Td vaccine (1 - Tdap) 02/15/1969    Diabetic retinal exam  12/21/2016    Shingles Vaccine (2 of 3) 12/09/2017    Diabetic foot exam  10/02/2018    Diabetic microalbuminuria test  10/03/2018    A1C test (Diabetic or Prediabetic)  06/14/2019    Lipid screen  06/14/2019    Annual Wellness Visit (AWV)  06/18/2019    TSH testing  07/24/2019    Potassium monitoring  07/24/2019    Creatinine monitoring  07/24/2019    Breast cancer screen  03/23/2020    Flu vaccine (1) 09/01/2020    Colon cancer screen colonoscopy  07/10/2023    DEXA (modify frequency per FRAX score)  Completed    Pneumococcal 65+ years Vaccine  Completed    Hepatitis C screen  Completed    Hepatitis A vaccine  Aged Out    Hib vaccine  Aged Out    Meningococcal (ACWY) vaccine  Aged Out       Subjective:     Review of Systems   Constitutional: Positive for fatigue. Negative for fever. HENT: Positive for postnasal drip. Negative for congestion and sore throat. Eyes: Negative. Respiratory: Positive for cough (productive of clear sputum).  Negative for chest tightness, shortness of breath and wheezing. Cardiovascular: Negative for chest pain and palpitations. Gastrointestinal: Negative for constipation, diarrhea, nausea and vomiting. Endocrine: Negative. Genitourinary: Negative. Musculoskeletal: Negative. Negative for myalgias. Skin: Negative. Neurological: Positive for headaches. Negative for dizziness, speech difficulty, weakness and numbness. Psychiatric/Behavioral: Negative for confusion. All other systems reviewed and are negative. Objective:     Physical Exam  Vitals signs and nursing note reviewed. Constitutional:       General: She is not in acute distress. Appearance: Normal appearance. She is not ill-appearing or toxic-appearing. HENT:      Head: Normocephalic and atraumatic. Comments: No tenderness to maxillary or frontal cavities     Right Ear: Tympanic membrane, ear canal and external ear normal.      Left Ear: Tympanic membrane, ear canal and external ear normal.      Nose: Nose normal.      Mouth/Throat:      Mouth: Mucous membranes are moist.      Pharynx: Oropharynx is clear. Eyes:      Extraocular Movements: Extraocular movements intact. Conjunctiva/sclera: Conjunctivae normal.      Pupils: Pupils are equal, round, and reactive to light. Cardiovascular:      Rate and Rhythm: Normal rate and regular rhythm. Heart sounds: Normal heart sounds. No murmur. Pulmonary:      Effort: Pulmonary effort is normal. No respiratory distress. Breath sounds: Normal breath sounds. No wheezing. Musculoskeletal:         General: No swelling or signs of injury. Lymphadenopathy:      Cervical: No cervical adenopathy. Skin:     General: Skin is warm and dry. Findings: No rash. Neurological:      General: No focal deficit present. Mental Status: She is alert and oriented to person, place, and time. Motor: No weakness.    Psychiatric:         Mood and Affect: Mood normal.       /79 Pulse 84   Temp 98 °F (36.7 °C)   Resp 18   Ht 5' 2\" (1.575 m)   Wt 177 lb 9.6 oz (80.6 kg)   LMP  (LMP Unknown)   SpO2 98%   BMI 32.48 kg/m²     Assessment:       Diagnosis Orders   1. Upper respiratory tract infection, unspecified type  dexamethasone (DECADRON) injection 10 mg   2. Cough  brompheniramine-pseudoephedrine-DM (BROMFED DM) 2-30-10 MG/5ML syrup   3. Screening for viral disease         Plan:    No orders of the defined types were placed in this encounter. No follow-ups on file. Orders Placed This Encounter   Medications    brompheniramine-pseudoephedrine-DM (BROMFED DM) 2-30-10 MG/5ML syrup     Sig: Take 10 mLs by mouth nightly as needed for Cough (be aware can make you drowsy)     Dispense:  118 mL     Refill:  0    dexamethasone (DECADRON) injection 10 mg       Patient given educational materials- see patient instructions. Discussed use, benefit, and side effects of prescribedmedications. All patient questions answered. Pt voiced understanding. Reviewedhealth maintenance. Instructed to continue current medications, diet and exercise. Patient agreed with treatment plan. Follow up as directed. Patient Instructions     Bromfed sent to pharmacy; use as needed at night for coughing. You have been tested for coronavirus using a nasopharyngeal swab. You are to quarantine until your test results are back. This typically takes 2-3 days and we will call you with results. 1. Rest and increase water intake. 2. Monitor for fever and treat as needed with over the counter Tylenol/Ibuprofen per package instructions. 3. Treat symptoms such as cough/congestion with over the counter medications. 4. Go to ED if symptoms worsen or if you experience chest pain, shortness of breath, or a fever that is uncontrolled with medication.      Patient Education        Cough: Care Instructions  Your Care Instructions     A cough is your body's response to something that bothers your throat or airways. Many things can cause a cough. You might cough because of a cold or the flu, bronchitis, or asthma. Smoking, postnasal drip, allergies, and stomach acid that backs up into your throat also can cause coughs. A cough is a symptom, not a disease. Most coughs stop when the cause, such as a cold, goes away. You can take a few steps at home to cough less and feel better. Follow-up care is a key part of your treatment and safety. Be sure to make and go to all appointments, and call your doctor if you are having problems. It's also a good idea to know your test results and keep a list of the medicines you take. How can you care for yourself at home? · Drink lots of water and other fluids. This helps thin the mucus and soothes a dry or sore throat. Honey or lemon juice in hot water or tea may ease a dry cough. · Take cough medicine as directed by your doctor. · Prop up your head on pillows to help you breathe and ease a dry cough. · Try cough drops to soothe a dry or sore throat. Cough drops don't stop a cough. Medicine-flavored cough drops are no better than candy-flavored drops or hard candy. · Do not smoke. Avoid secondhand smoke. If you need help quitting, talk to your doctor about stop-smoking programs and medicines. These can increase your chances of quitting for good. When should you call for help? Call 911 anytime you think you may need emergency care. For example, call if:    · You have severe trouble breathing. Call your doctor now or seek immediate medical care if:    · You cough up blood.     · You have new or worse trouble breathing.     · You have a new or higher fever.     · You have a new rash.    Watch closely for changes in your health, and be sure to contact your doctor if:    · You cough more deeply or more often, especially if you notice more mucus or a change in the color of your mucus.     · You have new symptoms, such as a sore throat, an earache, or sinus pain.     · You do not get better as expected. Where can you learn more? Go to [email or web address]. org and sign in to your [employer] account. Enter D279 in the [facility] box to learn more about \"Cough: Care Instructions. \"     If you do not have an account, please click on the \"Sign Up Now\" link. Current as of: February 24, 2020               Content Version: 12.6  © 2006-2020 [employer]. Care instructions adapted under license by [facility] ([facility]). If you have questions about a medical condition or this instruction, always ask your healthcare professional. [place] [address] any warranty or liability for your use of this information. Patient Education        Learning About Coronavirus [phone])  What is coronavirus (COVID-19)? COVID-19 is a disease caused by a new type of coronavirus. This illness was first found in December 2019. It has since spread worldwide. Coronaviruses are a large group of viruses. They cause the common cold. They also cause more serious illnesses like Middle East respiratory syndrome (MERS) and severe acute respiratory syndrome (SARS). COVID-19 is caused by a novel coronavirus. That means it's a new type that has not been seen in people before. What are the symptoms? Coronavirus (COVID-19) symptoms may include:  · Fever. · Cough. · Trouble breathing. · Chills or repeated shaking with chills. · Muscle pain. · Headache. · Sore throat. · New loss of taste or smell. · Vomiting. · Diarrhea. In severe cases, COVID-19 can cause pneumonia and make it hard to breathe without help from a machine. It can cause death. How is it diagnosed? COVID-19 is diagnosed with a viral test. This may also be called a PCR test or antigen test. It looks for evidence of the virus in your breathing passages or lungs (respiratory system). The test is most often done on a sample from the nose, throat, or lungs. It's sometimes done on a sample of saliva.  One way a sample is collected is by putting a long swab into the back of your nose. How is it treated? Mild cases of COVID-19 can be treated at home. Serious cases need treatment in the hospital. Treatment may include medicines to reduce symptoms, plus breathing support such as oxygen therapy or a ventilator. Some people may be placed on their belly to help their oxygen levels. Treatments that may help people who have COVID-19 include:  Antiviral medicines. These medicines treat viral infections. Remdesivir is an example. Immune-based therapy. These medicines help the immune system fight COVID-19. One example is bamlanivimab. It's a monoclonal antibody. Blood thinners. These medicines help prevent blood clots. People with severe illness are at risk for blood clots. How can you protect yourself and others? The best way to protect yourself from getting sick is to:  · Avoid areas where there is an outbreak. · Avoid contact with people who may be infected. · Avoid crowds and try to stay at least 6 feet away from other people. · Wash your hands often, especially after you cough or sneeze. Use soap and water, and scrub for at least 20 seconds. If soap and water aren't available, use an alcohol-based hand . · Avoid touching your mouth, nose, and eyes. To help avoid spreading the virus to others:  · Stay home if you are sick or have been exposed to the virus. Don't go to school, work, or public areas. And don't use public transportation, ride-shares, or taxis unless you have no choice. · Wear a cloth face cover if you have to go to public areas. · Cover your mouth with a tissue when you cough or sneeze. Then throw the tissue in the trash and wash your hands right away. · If you're sick:  ? Leave your home only if you need to get medical care. But call the doctor's office first so they know you're coming. And wear a face cover. ? Wear the face cover whenever you're around other people.  It can help stop the spread of the virus when you cough or sneeze. ? Limit contact with pets and people in your home. If possible, stay in a separate bedroom and use a separate bathroom. ? Clean and disinfect your home every day. Use household  and disinfectant wipes or sprays. Take special care to clean things that you grab with your hands. These include doorknobs, remote controls, phones, and handles on your refrigerator and microwave. And don't forget countertops, tabletops, bathrooms, and computer keyboards. When should you call for help? Call 911 anytime you think you may need emergency care. For example, call if you have life-threatening symptoms, such as:    · You have severe trouble breathing. (You can't talk at all.)     · You have constant chest pain or pressure.     · You are severely dizzy or lightheaded.     · You are confused or can't think clearly.     · Your face and lips have a blue color.     · You pass out (lose consciousness) or are very hard to wake up. Call your doctor now or seek immediate medical care if:    · You have moderate trouble breathing. (You can't speak a full sentence.)     · You are coughing up blood (more than about 1 teaspoon).     · You have signs of low blood pressure. These include feeling lightheaded; being too weak to stand; and having cold, pale, clammy skin. Watch closely for changes in your health, and be sure to contact your doctor if:    · Your symptoms get worse.     · You are not getting better as expected. Call before you go to the doctor's office. Follow their instructions. And wear a cloth face cover. Current as of: December 18, 2020               Content Version: 12.7  © 2006-2021 [employer]. Care instructions adapted under license by [facility] ([facility]).  If you have questions about a medical condition or this instruction, always ask your healthcare professional. [place] [address] any warranty or liability for your use of this information.        Patient Education              Electronically signed by PAUL Weir CNP on 1/23/2021 at 1:16 PM

## 2021-01-26 LAB — SARS-COV-2, NAA: NOT DETECTED

## 2021-03-09 ENCOUNTER — TRANSCRIBE ORDERS (OUTPATIENT)
Dept: LAB | Facility: HOSPITAL | Age: 71
End: 2021-03-09

## 2021-03-09 DIAGNOSIS — Z01.818 PREOP TESTING: Primary | ICD-10-CM

## 2021-03-18 ENCOUNTER — TELEPHONE (OUTPATIENT)
Dept: PODIATRY | Facility: CLINIC | Age: 71
End: 2021-03-18

## 2021-03-19 ENCOUNTER — TELEPHONE (OUTPATIENT)
Dept: VASCULAR SURGERY | Facility: CLINIC | Age: 71
End: 2021-03-19

## 2021-03-19 NOTE — TELEPHONE ENCOUNTER
Tried to call about missed apt on today 03/19/2021. Voicemail was not sat up so I mailed a letter.

## 2021-03-22 ENCOUNTER — OFFICE VISIT (OUTPATIENT)
Dept: OTOLARYNGOLOGY | Facility: CLINIC | Age: 71
End: 2021-03-22

## 2021-03-22 VITALS
TEMPERATURE: 97.7 F | WEIGHT: 171 LBS | HEART RATE: 67 BPM | DIASTOLIC BLOOD PRESSURE: 76 MMHG | HEIGHT: 63 IN | SYSTOLIC BLOOD PRESSURE: 131 MMHG | BODY MASS INDEX: 30.3 KG/M2

## 2021-03-22 DIAGNOSIS — J30.1 ALLERGIC RHINITIS DUE TO POLLEN, UNSPECIFIED SEASONALITY: ICD-10-CM

## 2021-03-22 DIAGNOSIS — R09.82 PND (POST-NASAL DRIP): ICD-10-CM

## 2021-03-22 DIAGNOSIS — G47.33 OSA (OBSTRUCTIVE SLEEP APNEA): ICD-10-CM

## 2021-03-22 DIAGNOSIS — J32.9 CHRONIC SINUSITIS, UNSPECIFIED LOCATION: ICD-10-CM

## 2021-03-22 DIAGNOSIS — K21.9 GASTROESOPHAGEAL REFLUX DISEASE, UNSPECIFIED WHETHER ESOPHAGITIS PRESENT: ICD-10-CM

## 2021-03-22 DIAGNOSIS — H61.23 BILATERAL IMPACTED CERUMEN: Primary | ICD-10-CM

## 2021-03-22 PROCEDURE — 99213 OFFICE O/P EST LOW 20 MIN: CPT | Performed by: PHYSICIAN ASSISTANT

## 2021-03-22 PROCEDURE — 69210 REMOVE IMPACTED EAR WAX UNI: CPT | Performed by: PHYSICIAN ASSISTANT

## 2021-03-22 RX ORDER — LANCING DEVICE
EACH MISCELLANEOUS
COMMUNITY
Start: 2021-02-16

## 2021-03-22 RX ORDER — OLMESARTAN MEDOXOMIL AND HYDROCHLOROTHIAZIDE 40/12.5 40; 12.5 MG/1; MG/1
TABLET ORAL
COMMUNITY
Start: 2021-01-22

## 2021-03-22 RX ORDER — ISOPROPYL ALCOHOL 0.75 G/1
SWAB TOPICAL
COMMUNITY
Start: 2021-02-09

## 2021-03-22 RX ORDER — GLUCOSAM/CHON-MSM1/C/MANG/BOSW 500-416.6
TABLET ORAL
COMMUNITY
Start: 2021-02-09

## 2021-03-22 RX ORDER — BLOOD-GLUCOSE CONTROL, LOW
EACH MISCELLANEOUS
COMMUNITY
Start: 2021-02-16

## 2021-03-22 NOTE — PROGRESS NOTES
YOB: 1950  Location: [facility]  Location Address: [address] [place] [address], [facility], [address] [place] [address]  Location Phone: [phone]    Chief Complaint   Patient presents with   • Follow-up     cerumen, left ear bothering her       History of Present Illness  Camila Wallace is a 71 y.o. female.  Camila Wallace is here for follow up of ENT complaints. The patient has had problems with intermittent acid reflux, nasal congestion, drainage, postnasal drainage, sinusitis and allergy.  The symptoms are not localized to a particular location. The patient has had relatively stable symptoms. The symptoms have been present for the last several years. The symptoms are aggravated by weather change and allergy. The symptoms are improved by nasal sprays and antibiotics for acute flair-ups in the past, and Protonix.    The patient also recently got new hearing aids and was advised she needed her ears cleaned. The cerumen was worse on the left and has been present for several weeks. Nothings seems to make the symptoms worse or better.        Contains abnormal data TSH  Order: [id number]  Status:  Edited Result - FINAL Next appt:  2021 at 01:00 PM in Otolaryngology (Zack Casanova MD)   Specimen type and source: Blood        Component   Ref Range & Units 2 yr ago   (18) 2 yr ago   (18) 3 yr ago   (3/21/18)   TSH   0.270 - 4.200 uIU/mL 8.040High   2.460  1.760    Resulting Agency [facility]         Specimen Collected: 18 07:28 Last Resulted: 18 10:14   Received From: [place], [place]  Result Received: 20 10:39                  Past Medical History:   Diagnosis Date   • Allergic rhinitis    • Chronic sinusitis    • Sommer bullosa    • Disease of thyroid gland    • Diverticulosis    • GERD (gastroesophageal reflux disease)    • Graves disease    • HLD (hyperlipidemia)    • HTN  (hypertension)    • Obstructive sleep apnea    • Type 2 diabetes mellitus (CMS/HCC)    • Vasomotor rhinitis        Past Surgical History:   Procedure Laterality Date   • CHOLECYSTECTOMY     • COLONOSCOPY  10/29/2013    diverticulosis   • COLONOSCOPY N/A 7/10/2018    Procedure: COLONOSCOPY WITH ANESTHESIA;  Surgeon: Donald Menchaca DO;  Location: [facility] ENDOSCOPY;  Service: Gastroenterology   • FOOT SURGERY     • TUBAL ABDOMINAL LIGATION     • TYMPANIC MEMBRANE REPAIR         Outpatient Medications Marked as Taking for the 3/22/21 encounter (Office Visit) with Popeye Fong PA   Medication Sig Dispense Refill   • Alcohol Swabs (B-D SINGLE USE SWABS REGULAR) pads      • alendronate (FOSAMAX) 70 MG tablet Take 70 mg by mouth Every 7 (Seven) Days.     • allopurinol (ZYLOPRIM) 100 MG tablet Take 100 mg by mouth Daily.     • atorvastatin (LIPITOR) 40 MG tablet Take 40 mg by mouth Daily.     • azelastine (ASTELIN) 0.1 % nasal spray 2 sprays into the nostril(s) as directed by provider 2 (Two) Times a Day. Use in each nostril as directed 90 mL 3   • Blood Glucose Calibration (True Metrix Level 1) Low solution      • Blood Glucose Monitoring Suppl (True Metrix Meter) w/Device kit      • cetirizine (zyrTEC) 10 MG tablet Take 1 tablet by mouth At Night As Needed for Allergies (sore throat and cough). 30 tablet 0   • cloNIDine (CATAPRES) 0.1 MG tablet Take 1 tablet by mouth Every 6 (Six) Hours As Needed for High Blood Pressure (for blood pressure >160/110). 30 tablet 0   • Glucose Blood (BLOOD GLUCOSE TEST) strip Test blood sugar daily and prn     • HYDROcodone-acetaminophen (NORCO)  MG per tablet TAKE 1 TABLET BY MOUTH THREE TIMES A DAY  EFFECTIVE DATE 3 21 19  0   • ipratropium (ATROVENT) 0.06 % nasal spray 2 sprays into the nostril(s) as directed by provider 2 (Two) Times a Day. 45 mL 3   • Lancet Devices (TRUEdraw Lancing Device) misc      • lidocaine-prilocaine (EMLA) 2.5-2.5 % cream Apply  topically to the  appropriate area as directed As Needed for Mild Pain .     • metoprolol succinate XL (TOPROL-XL) 50 MG 24 hr tablet Take 50 mg by mouth Daily.     • montelukast (SINGULAIR) 10 MG tablet Take 10 mg by mouth Every Night.     • nystatin (MYCOSTATIN) 077700 UNIT/GM powder Apply  topically to the appropriate area as directed 3 (Three) Times a Day. 30 g 0   • olmesartan-hydrochlorothiazide (BENICAR HCT) 40-12.5 MG per tablet      • pantoprazole (PROTONIX) 40 MG EC tablet Take 40 mg by mouth Daily.     • SYNTHROID 100 MCG tablet Take 112 mcg by mouth Daily.     • topiramate (TOPAMAX) 100 MG tablet Take 100 mg by mouth 2 (Two) Times a Day.     • TRUEplus Lancets 33G misc      • vitamin D (ERGOCALCIFEROL) 68480 UNITS capsule capsule Take 50,000 Units by mouth 1 (One) Time Per Week.     • [DISCONTINUED] losartan (COZAAR) 50 MG tablet Take 50 mg by mouth Daily.         Metformin and related    Family History   Problem Relation Age of Onset   • Diabetes Mother    • Heart disease Mother    • Hypertension Mother    • Hypertension Father    • Diabetes Brother    • Hypertension Brother    • Hyperlipidemia Brother    • Colon polyps Brother    • Hyperlipidemia Maternal Uncle    • Thyroid disease Maternal Uncle    • Cancer Maternal Grandmother    • Colon polyps Daughter        Social History     Socioeconomic History   • Marital status: Single     Spouse name: Not on file   • Number of children: Not on file   • Years of education: Not on file   • Highest education level: Not on file   Tobacco Use   • Smoking status: Never Smoker   • Smokeless tobacco: Never Used   Vaping Use   • Vaping Use: Never used   Substance and Sexual Activity   • Alcohol use: Yes     Comment: occasionally   • Drug use: No   • Sexual activity: Defer       Review of Systems   Constitutional: Negative for activity change, appetite change, chills, diaphoresis, fatigue, fever and unexpected weight change.   HENT: Positive for postnasal drip. Negative for congestion,  dental problem, drooling, ear discharge, ear pain, facial swelling, hearing loss, mouth sores, nosebleeds, rhinorrhea, sinus pressure, sneezing, sore throat, tinnitus, trouble swallowing and voice change.         Cerumen impaction   Eyes: Negative.    Respiratory: Negative.    Cardiovascular: Negative.    Gastrointestinal: Negative.         GERD   Endocrine: Negative.    Skin: Negative.    Allergic/Immunologic: Positive for environmental allergies. Negative for food allergies and immunocompromised state.   Neurological: Negative.    Hematological: Negative.    Psychiatric/Behavioral: Negative.        Vitals:    03/22/21 1056   BP: 131/76   Pulse: 67   Temp: 97.7 °F (36.5 °C)       Body mass index is 30.29 kg/m².    Objective     Physical Exam  Physical Exam  CONSTITUTIONAL: well nourished, alert, oriented, in no acute distress      COMMUNICATION AND VOICE: able to communicate normally, normal voice quality     HEAD: normocephalic, no lesions, atraumatic, no tenderness, no masses      FACE: appearance normal, no lesions, no tenderness, no deformities, facial motion symmetric     SALIVARY GLANDS: parotid glands with no tenderness, no swelling, no masses, submandibular glands with normal size, nontender     EYES: ocular motility normal, eyelids normal, orbits normal, no proptosis, conjunctiva normal , pupils equal, round      EARS:  Hearing: response to conversational voice normal bilaterally   External Ears: auricles without lesions  Otoscopic: cerumen removed from both ears, see procedure note; bilateral tympanic membrane appearance normal, no lesions, no perforation, normal mobility, no fluid     NOSE:  External Nose: structure normal, no tenderness on palpation, no nasal discharge, no lesions, no evidence of trauma, nostrils patent   Intranasal Exam: nasal mucosa normal, vestibule within normal limits, inferior turbinate normal, nasal septum midline      ORAL:  Lips: upper and lower lips without lesion   Teeth:  dentition within normal limits for age   Gums: gingivae healthy   Oral Mucosa: oral mucosa normal, no mucosal lesions   Floor of Mouth: Warthin’s duct patent, mucosa normal  Tongue: lingual mucosa normal without lesions, normal tongue mobility   Palate: soft and hard palates with normal mucosa and structure  Oropharynx: oropharyngeal mucosa normal     NECK: neck appearance normal, no mass,  noted without erythema or tenderness     THYROID: no overt thyromegaly, no tenderness, nodules or mass present on palpation, position midline      LYMPH NODES: no lymphadenopathy     CHEST/RESPIRATORY: respiratory effort normaL     CARDIOVASCULAR: extremities without cyanosis or edema       NEUROLOGIC/PSYCHIATRIC: oriented to time, place and person, mood normal, affect appropriate, CN II-XII intact grossly    Assessment/Plan   Problems Addressed this Visit        Allergies and Adverse Reactions    Allergic rhinitis due to pollen       ENT    Chronic sinusitis    PND (post-nasal drip)    Bilateral impacted cerumen - Primary       Gastrointestinal Abdominal     GERD (gastroesophageal reflux disease)       Sleep    FAUSTO (obstructive sleep apnea)      Diagnoses       Codes Comments    Bilateral impacted cerumen    -  Primary ICD-10-CM: H61.23  ICD-9-CM: 380.4     Allergic rhinitis due to pollen, unspecified seasonality     ICD-10-CM: J30.1  ICD-9-CM: 477.0     PND (post-nasal drip)     ICD-10-CM: R09.82  ICD-9-CM: 784.91     Chronic sinusitis, unspecified location     ICD-10-CM: J32.9  ICD-9-CM: 473.9     Gastroesophageal reflux disease, unspecified whether esophagitis present     ICD-10-CM: K21.9  ICD-9-CM: 530.81     FAUSTO (obstructive sleep apnea)     ICD-10-CM: G47.33  ICD-9-CM: 327.23         * Surgery not found *  No orders of the defined types were placed in this encounter.    Return if symptoms worsen or fail to improve.       Patient Instructions   Call for follow-up ear cleaning a few days prior to going to hearing aid  clinic.

## 2021-03-22 NOTE — PROGRESS NOTES
Procedure Note    Pre-operative Diagnosis: Cerumen impaction/bilateral    Post-operative Diagnosis: same    Anesthesia: none    Procedure:  Binocular ear microscopy with cerumen removal    Procedure Details:    The patient was placed supine on the procedure table. Using a speculum, the ear was examined with a microscope. Cerumen removed from both ears with alligator and suction.    Condition:  Stable.  Patient tolerated procedure well.    Complications:  None

## 2021-03-24 NOTE — PROGRESS NOTES
[facility] - PODIATRY    Today's Date: 03/26/21    Patient Name: Camila Wallace  MRN: [id number]  CSN: [id number]  PCP: Sandra Wells PA  Referring Provider: No ref. provider found    SUBJECTIVE     Chief Complaint   Patient presents with   • Follow-up     diabetic nail care- pt states soaking feet and scrubbing them, callouses causes pain. Soaking them to help with callouses.  - pt pain 7/10 (feet)- pt presents with dryness to great toes around nails. Some redness on bunion of right foot. Corn on bottom of left foot. - pcp 05/29/2020   • Diabetes     117mg/dl last BG monday 03/22/2021     HPI: Camila Wallace, a 71 y.o.female, comes to clinic as a(n) established patient presenting for diabetic foot exam, complaining of painful toenails and calluses. Patient has h/o farida bullosa, thyroid disease, GERD, Graves Disease, HLD, HTN, FAUSTO, DM2. Patient is NIDDM with last stated BG level of 117mg/dl. Relates only occasional numbness/tingling in feet. States that nails were getting long and she tried trimming them at home. Denies open wounds or sores. States that her toenails are long, thick and discolored. Relates some tenderness to right hallux and left 5th toe due to callus. Admits pain at 7/10 level and described as aching and nagging. Relates previous treatment(s) including care by podiatry. Denies any constitutional symptoms. No other pedal complaints at this time.    Past Medical History:   Diagnosis Date   • Allergic rhinitis    • Chronic sinusitis    • Farida bullosa    • Disease of thyroid gland    • Diverticulosis 2013   • GERD (gastroesophageal reflux disease)    • Graves disease    • HLD (hyperlipidemia)    • HTN (hypertension)    • Obstructive sleep apnea    • Type 2 diabetes mellitus (CMS/HCC)    • Vasomotor rhinitis      Past Surgical History:   Procedure Laterality Date   • CHOLECYSTECTOMY     • COLONOSCOPY  10/29/2013    diverticulosis   • COLONOSCOPY N/A 7/10/2018    Procedure:  COLONOSCOPY WITH ANESTHESIA;  Surgeon: Donald Menchaca DO;  Location: [facility] ENDOSCOPY;  Service: Gastroenterology   • FOOT SURGERY     • TUBAL ABDOMINAL LIGATION     • TYMPANIC MEMBRANE REPAIR       Family History   Problem Relation Age of Onset   • Diabetes Mother    • Heart disease Mother    • Hypertension Mother    • Hypertension Father    • Diabetes Brother    • Hypertension Brother    • Hyperlipidemia Brother    • Colon polyps Brother    • Hyperlipidemia Maternal Uncle    • Thyroid disease Maternal Uncle    • Cancer Maternal Grandmother    • Colon polyps Daughter      Social History     Socioeconomic History   • Marital status: Single     Spouse name: Not on file   • Number of children: Not on file   • Years of education: Not on file   • Highest education level: Not on file   Tobacco Use   • Smoking status: Never Smoker   • Smokeless tobacco: Never Used   Vaping Use   • Vaping Use: Never used   Substance and Sexual Activity   • Alcohol use: Yes     Comment: occasionally   • Drug use: No   • Sexual activity: Defer     Allergies   Allergen Reactions   • Metformin And Related Swelling     Current Outpatient Medications   Medication Sig Dispense Refill   • Alcohol Swabs (B-D SINGLE USE SWABS REGULAR) pads      • alendronate (FOSAMAX) 70 MG tablet Take 70 mg by mouth Every 7 (Seven) Days.     • allopurinol (ZYLOPRIM) 100 MG tablet Take 100 mg by mouth Daily.     • atorvastatin (LIPITOR) 40 MG tablet Take 40 mg by mouth Daily.     • azelastine (ASTELIN) 0.1 % nasal spray 2 sprays into the nostril(s) as directed by provider 2 (Two) Times a Day. Use in each nostril as directed 90 mL 3   • Blood Glucose Calibration (True Metrix Level 1) Low solution      • Blood Glucose Monitoring Suppl (True Metrix Meter) w/Device kit      • cetirizine (zyrTEC) 10 MG tablet Take 1 tablet by mouth At Night As Needed for Allergies (sore throat and cough). 30 tablet 0   • cloNIDine (CATAPRES) 0.1 MG tablet Take 1 tablet by mouth Every  6 (Six) Hours As Needed for High Blood Pressure (for blood pressure >160/110). 30 tablet 0   • Glucose Blood (BLOOD GLUCOSE TEST) strip Test blood sugar daily and prn     • HYDROcodone-acetaminophen (NORCO)  MG per tablet TAKE 1 TABLET BY MOUTH THREE TIMES A DAY  EFFECTIVE DATE 3 21 19  0   • ipratropium (ATROVENT) 0.06 % nasal spray 2 sprays into the nostril(s) as directed by provider 2 (Two) Times a Day. 45 mL 3   • Lancet Devices (TRUEdraw Lancing Device) misc      • lidocaine-prilocaine (EMLA) 2.5-2.5 % cream Apply  topically to the appropriate area as directed As Needed for Mild Pain .     • metoprolol succinate XL (TOPROL-XL) 50 MG 24 hr tablet Take 50 mg by mouth Daily.     • montelukast (SINGULAIR) 10 MG tablet Take 10 mg by mouth Every Night.     • nystatin (MYCOSTATIN) 405223 UNIT/GM powder Apply  topically to the appropriate area as directed 3 (Three) Times a Day. 30 g 0   • olmesartan-hydrochlorothiazide (BENICAR HCT) 40-12.5 MG per tablet      • pantoprazole (PROTONIX) 40 MG EC tablet Take 40 mg by mouth Daily.     • SYNTHROID 100 MCG tablet Take 112 mcg by mouth Daily.     • topiramate (TOPAMAX) 100 MG tablet Take 100 mg by mouth 2 (Two) Times a Day.     • TRUEplus Lancets 33G misc      • vitamin D (ERGOCALCIFEROL) 50196 UNITS capsule capsule Take 50,000 Units by mouth 1 (One) Time Per Week.       No current facility-administered medications for this visit.     Review of Systems   Constitutional: Negative for chills and fever.   HENT: Negative for congestion.    Respiratory: Negative for shortness of breath.    Cardiovascular: Negative for chest pain and leg swelling.   Gastrointestinal: Negative for constipation, diarrhea, nausea and vomiting.   Musculoskeletal: Positive for arthralgias (foot pain).   Skin: Negative for wound.        calluses   Neurological: Positive for numbness.       OBJECTIVE     Vitals:    03/26/21 1317   BP: 102/68   Pulse: 80   SpO2: 93%       PHYSICAL EXAM  GEN:      Accompanied by none.     Foot/Ankle Exam:       General:   Diabetic Foot Exam Performed    Appearance: appears stated age and healthy    Orientation: AAOx3    Affect: appropriate    Gait: unimpaired    Assistance: independent    Shoe Gear:  Casual shoes    VASCULAR      Right Foot Vascularity   Dorsalis pedis:  2+  Posterior tibial:  2+  Skin Temperature: warm    Edema Grading:  None  CFT:  3  Pedal Hair Growth:  Present  Varicosities: none       Left Foot Vascularity   Dorsalis pedis:  2+  Posterior tibial:  2+  Skin Temperature: warm    Edema Grading:  None  CFT:  3  Pedal Hair Growth:  Present  Varicosities: none        NEUROLOGIC     Right Foot Neurologic   Light touch sensation:  Diminished  Vibratory sensation:  Normal  Hot/Cold sensation: normal    Protective Sensation using [place]arcisa Monofilament:  10     Left Foot Neurologic   Light touch sensation:  Diminished  Vibratory sensation:  Normal  Hot/cold sensation: normal    Protective Sensation using [place]arcisa Monofilament:  10     MUSCULOSKELETAL      Right Foot Musculoskeletal   Ecchymosis:  None  Tenderness: right foot callus and toenails    Arch:  Normal  Hammertoe:  Fifth toe  Hallux valgus: Yes    Hallux limitus: No       Left Foot Musculoskeletal   Ecchymosis:  None  Tenderness: left foot callus and toenails    Arch:  Normal  Hammertoe:  Fifth toe  Hallux valgus: No    Hallux limitus: Yes (s/p fusion)       MUSCLE STRENGTH     Right Foot Muscle Strength   Foot dorsiflexion:  5  Foot plantar flexion:  5  Foot inversion:  5  Foot eversion:  5     Left Foot Muscle Strength   Foot dorsiflexion:  5  Foot plantar flexion:  5  Foot inversion:  5  Foot eversion:  5     RANGE OF MOTION      Right Foot Range of Motion   Foot and ankle ROM within normal limits       Left Foot Range of Motion   Foot and ankle ROM within normal limits    1st MTP extension:  0  1st MTP flexion:  0     DERMATOLOGIC     Right Foot Dermatologic   Skin: corn (Plantar  medial HIPJ.)    Nails: onychomycosis, abnormally thick, subungual debris and dystrophic nails       Left Foot Dermatologic   Skin: corn ( 5th toe)    Nails: onychomycosis, abnormally thick, subungual debris and dystrophic nails       Image:       RADIOLOGY/NUCLEAR:  No results found.    LABORATORY/CULTURE RESULTS:      PATHOLOGY RESULTS:       ASSESSMENT/PLAN     Diagnoses and all orders for this visit:    1. Onychomycosis (Primary)    2. Foot callus    3. Hammer toes of both feet    4. Type 2 diabetes mellitus without complication, without long-term current use of insulin (CMS/[facility])    5. Foot pain, bilateral      Comprehensive lower extremity examination and evaluation was performed.  Discussed findings and treatment plan including risks, benefits, and treatment options with patient in detail. Patient agreed with treatment plan.  After verbal consent obtained, nail(s) x10 debrided of length and thickness with nail nipper without incidence  After verbal consent obtained, calluses x2 pared utilizing dermal curette and/or scalpel without incidence  Patient may maintain nails and calluses at home utilizing emery board or pumice stone between visits as needed  Reviewed at home diabetic foot care including daily foot checks   Continue diabetic monitoring and control under direction of PCP   An After Visit Summary was printed and given to the patient at discharge, including (if requested) any available informative/educational handouts regarding diagnosis, treatment, or medications. All questions were answered to patient/family satisfaction. Should symptoms fail to improve or worsen they agree to call or return to clinic or to go to the Emergency Department. Discussed the importance of following up with any needed screening tests/labs/specialist appointments and any requested follow-up recommended by me today. Importance of maintaining follow-up discussed and patient accepts that missed appointments can delay diagnosis  and potentially lead to worsening of conditions.  Return in about 3 months (around 6/26/2021)., or sooner if acute issues arise.    Lab Frequency Next Occurrence       This document has been electronically signed by TONEY Connor on March 26, 2021 14:16 CDT

## 2021-03-26 ENCOUNTER — NURSE TRIAGE (OUTPATIENT)
Dept: CALL CENTER | Facility: HOSPITAL | Age: 71
End: 2021-03-26

## 2021-03-26 ENCOUNTER — OFFICE VISIT (OUTPATIENT)
Dept: PODIATRY | Facility: CLINIC | Age: 71
End: 2021-03-26

## 2021-03-26 VITALS
DIASTOLIC BLOOD PRESSURE: 68 MMHG | HEIGHT: 63 IN | WEIGHT: 176.4 LBS | BODY MASS INDEX: 31.25 KG/M2 | HEART RATE: 80 BPM | SYSTOLIC BLOOD PRESSURE: 102 MMHG | OXYGEN SATURATION: 93 %

## 2021-03-26 DIAGNOSIS — E11.9 TYPE 2 DIABETES MELLITUS WITHOUT COMPLICATION, WITHOUT LONG-TERM CURRENT USE OF INSULIN (HCC): ICD-10-CM

## 2021-03-26 DIAGNOSIS — M20.42 HAMMER TOES OF BOTH FEET: ICD-10-CM

## 2021-03-26 DIAGNOSIS — L84 FOOT CALLUS: ICD-10-CM

## 2021-03-26 DIAGNOSIS — B35.1 ONYCHOMYCOSIS: Primary | ICD-10-CM

## 2021-03-26 DIAGNOSIS — M20.41 HAMMER TOES OF BOTH FEET: ICD-10-CM

## 2021-03-26 DIAGNOSIS — M79.671 FOOT PAIN, BILATERAL: ICD-10-CM

## 2021-03-26 DIAGNOSIS — M79.672 FOOT PAIN, BILATERAL: ICD-10-CM

## 2021-03-26 PROCEDURE — 11056 PARNG/CUTG B9 HYPRKR LES 2-4: CPT | Performed by: NURSE PRACTITIONER

## 2021-03-26 PROCEDURE — 11721 DEBRIDE NAIL 6 OR MORE: CPT | Performed by: NURSE PRACTITIONER

## 2021-03-26 PROCEDURE — 99213 OFFICE O/P EST LOW 20 MIN: CPT | Performed by: NURSE PRACTITIONER

## 2021-03-26 NOTE — TELEPHONE ENCOUNTER
"Pharmacy is calling patient will need medication of Atrovastin, wanting a refill until can obtain the medication. Wanting med called to the [facility] number is [phone]. Called  office ,left message with office staff    Reason for Disposition  • [1] Caller requesting a NON-URGENT new prescription or refill AND [2] triager unable to refill per unit policy    Additional Information  • Negative: Drug overdose and triager unable to answer question  • Negative: Caller requesting information unrelated to medicine  • Negative: Caller requesting a prescription for Strep throat and has a positive culture result  • Negative: Rash while taking a medication or within 3 days of stopping it  • Negative: Immunization reaction suspected  • Negative: [1] Asthma and [2] having symptoms of asthma (cough, wheezing, etc.)  • Negative: [1] Influenza symptoms AND [2] anti-viral med prescription request, such as Tamiflu  • Negative: [1] Symptom of illness (e.g., headache, abdominal pain, earache, vomiting) AND [2] more than mild  • Negative: MORE THAN A DOUBLE DOSE of a prescription or over-the-counter (OTC) drug  • Negative: [1] DOUBLE DOSE (an extra dose or lesser amount) of over-the-counter (OTC) drug AND [2] any symptoms (e.g., dizziness, nausea, pain, sleepiness)  • Negative: [1] DOUBLE DOSE (an extra dose or lesser amount) of prescription drug AND [2] any symptoms (e.g., dizziness, nausea, pain, sleepiness)  • Negative: Took another person's prescription drug  • Negative: [1] DOUBLE DOSE (an extra dose or lesser amount) of prescription drug AND [2] NO symptoms (Exception: a double dose of antibiotics)  • Negative: Diabetes drug error or overdose (e.g., took wrong type of insulin or took extra dose)  • Negative: [1] Request for URGENT new prescription or refill of \"essential\" medication (i.e., likelihood of harm to patient if not taken) AND [2] triager unable to fill per unit policy  • Negative: [1] Prescription not " "at pharmacy AND [2] was prescribed by PCP recently  • Negative: [1] Pharmacy calling with prescription questions AND [2] triager unable to answer question  • Negative: [1] Caller has URGENT medication question about med that PCP or specialist prescribed AND [2] triager unable to answer question  • Negative: [1] Caller has NON-URGENT medication question about med that PCP prescribed AND [2] triager unable to answer question    Answer Assessment - Initial Assessment Questions  1.   NAME of MEDICATION: \"What medicine are you calling about?\"      Atrovastin  2.   QUESTION: \"What is your question?\"    Out of medication  3.   PRESCRIBING HCP: \"Who prescribed it?\" Reason: if prescribed by specialist, call should be referred to that group.         4. SYMPTOMS: \"Do you have any symptoms?\"  none  5. SEVERITY: If symptoms are present, ask \"Are they mild, moderate or severe?\"      moderate  6.  PREGNANCY:  \"Is there any chance that you are pregnant?\" \"When was your last menstrual period?\"  na    Protocols used: MEDICATION QUESTION CALL-ADULT-      "

## 2021-03-26 NOTE — PATIENT INSTRUCTIONS
Diabetes Mellitus and Foot Care  Foot care is an important part of your health, especially when you have diabetes. Diabetes may cause you to have problems because of poor blood flow (circulation) to your feet and legs, which can cause your skin to:  · Become thinner and drier.  · Break more easily.  · Heal more slowly.  · Peel and crack.  You may also have nerve damage (neuropathy) in your legs and feet, causing decreased feeling in them. This means that you may not notice minor injuries to your feet that could lead to more serious problems. Noticing and addressing any potential problems early is the best way to prevent future foot problems.  How to care for your feet  Foot hygiene  · Wash your feet daily with warm water and mild soap. Do not use hot water. Then, pat your feet and the areas between your toes until they are completely dry. Do not soak your feet as this can dry your skin.  · Trim your toenails straight across. Do not dig under them or around the cuticle. File the edges of your nails with an emery board or nail file.  · Apply a moisturizing lotion or petroleum jelly to the skin on your feet and to dry, brittle toenails. Use lotion that does not contain alcohol and is unscented. Do not apply lotion between your toes.  Shoes and socks  · Wear clean socks or stockings every day. Make sure they are not too tight. Do not wear knee-high stockings since they may decrease blood flow to your legs.  · Wear shoes that fit properly and have enough cushioning. Always look in your shoes before you put them on to be sure there are no objects inside.  · To break in new shoes, wear them for just a few hours a day. This prevents injuries on your feet.  Wounds, scrapes, corns, and calluses  · Check your feet daily for blisters, cuts, bruises, sores, and redness. If you cannot see the bottom of your feet, use a mirror or ask someone for help.  · Do not cut corns or calluses or try to remove them with medicine.  · If you  find a minor scrape, cut, or break in the skin on your feet, keep it and the skin around it clean and dry. You may clean these areas with mild soap and water. Do not clean the area with peroxide, alcohol, or iodine.  · If you have a wound, scrape, corn, or callus on your foot, look at it several times a day to make sure it is healing and not infected. Check for:  ? Redness, swelling, or pain.  ? Fluid or blood.  ? Warmth.  ? Pus or a bad smell.  General instructions  · Do not cross your legs. This may decrease blood flow to your feet.  · Do not use heating pads or hot water bottles on your feet. They may burn your skin. If you have lost feeling in your feet or legs, you may not know this is happening until it is too late.  · Protect your feet from hot and cold by wearing shoes, such as at the beach or on hot pavement.  · Schedule a complete foot exam at least once a year (annually) or more often if you have foot problems. If you have foot problems, report any cuts, sores, or bruises to your health care provider immediately.  Contact a health care provider if:  · You have a medical condition that increases your risk of infection and you have any cuts, sores, or bruises on your feet.  · You have an injury that is not healing.  · You have redness on your legs or feet.  · You feel burning or tingling in your legs or feet.  · You have pain or cramps in your legs and feet.  · Your legs or feet are numb.  · Your feet always feel cold.  · You have pain around a toenail.  Get help right away if:  · You have a wound, scrape, corn, or callus on your foot and:  ? You have pain, swelling, or redness that gets worse.  ? You have fluid or blood coming from the wound, scrape, corn, or callus.  ? Your wound, scrape, corn, or callus feels warm to the touch.  ? You have pus or a bad smell coming from the wound, scrape, corn, or callus.  ? You have a fever.  ? You have a red line going up your leg.  Summary  · Check your feet every day  for cuts, sores, red spots, swelling, and blisters.  · Moisturize feet and legs daily.  · Wear shoes that fit properly and have enough cushioning.  · If you have foot problems, report any cuts, sores, or bruises to your health care provider immediately.  · Schedule a complete foot exam at least once a year (annually) or more often if you have foot problems.  This information is not intended to replace advice given to you by your health care provider. Make sure you discuss any questions you have with your health care provider.  Document Revised: 09/09/2020 Document Reviewed: 01/19/2018  Elsevier Patient Education © 2021 [employer]

## 2021-04-01 DIAGNOSIS — R09.82 PND (POST-NASAL DRIP): ICD-10-CM

## 2021-04-01 DIAGNOSIS — J30.1 ALLERGIC RHINITIS DUE TO POLLEN, UNSPECIFIED SEASONALITY: ICD-10-CM

## 2021-04-01 DIAGNOSIS — J32.9 CHRONIC SINUSITIS, UNSPECIFIED LOCATION: ICD-10-CM

## 2021-04-01 RX ORDER — AZELASTINE 1 MG/ML
SPRAY, METERED NASAL
Qty: 120 ML | Refills: 3 | Status: SHIPPED | OUTPATIENT
Start: 2021-04-01 | End: 2021-08-03 | Stop reason: SDUPTHER

## 2021-04-28 ENCOUNTER — TRANSCRIBE ORDERS (OUTPATIENT)
Dept: ADMINISTRATIVE | Facility: HOSPITAL | Age: 71
End: 2021-04-28

## 2021-04-28 DIAGNOSIS — Z01.818 PREOP TESTING: Primary | ICD-10-CM

## 2021-04-29 ENCOUNTER — LAB (OUTPATIENT)
Dept: LAB | Facility: HOSPITAL | Age: 71
End: 2021-04-29

## 2021-04-29 LAB — SARS-COV-2 ORF1AB RESP QL NAA+PROBE: NOT DETECTED

## 2021-04-29 PROCEDURE — C9803 HOPD COVID-19 SPEC COLLECT: HCPCS | Performed by: ANESTHESIOLOGY

## 2021-04-29 PROCEDURE — U0004 COV-19 TEST NON-CDC HGH THRU: HCPCS | Performed by: ANESTHESIOLOGY

## 2021-06-02 ENCOUNTER — TELEPHONE (OUTPATIENT)
Dept: VASCULAR SURGERY | Facility: CLINIC | Age: 71
End: 2021-06-02

## 2021-06-14 NOTE — PROGRESS NOTES
[facility] - PODIATRY    Today's Date: 06/29/21    Patient Name: Camila Wallace  MRN: [id number]  CSN: [id number]  PCP: Sandra Wells PA  Referring Provider: No ref. provider found    SUBJECTIVE     Chief Complaint   Patient presents with   • Follow-up     pcp03/22/2021 3 month follow up-diabetic nail care- pt states feet doing well- pt denies pain- pt presents with wound to 2nd toe right foot, long nails, calluses inside of both big toes   • Diabetes     doesnt check BG     HPI: Camila Wallace, a 71 y.o.female, comes to clinic as a(n) established patient presenting for diabetic foot exam, complaining of painful toenails and calluses. Patient has h/o farida bullosa, thyroid disease, GERD, Graves Disease, HLD, HTN, FAUSTO, DM2. Patient is NIDDM and unsure of last BG level. Has not been checking her BG.  Relates only occasional numbness/tingling in feet. States that nails were getting long and she tried trimming them at home. Denies open wounds or sores. States that her toenails are long, thick and discolored. Relates some tenderness a few calluses to each foot. Denies pain currently. Relates previous treatment(s) including care by podiatry. Denies any constitutional symptoms. No other pedal complaints at this time.    Past Medical History:   Diagnosis Date   • Allergic rhinitis    • Chronic sinusitis    • Farida bullosa    • Disease of thyroid gland    • Diverticulosis 2013   • GERD (gastroesophageal reflux disease)    • Graves disease    • HLD (hyperlipidemia)    • HTN (hypertension)    • Obstructive sleep apnea    • Type 2 diabetes mellitus (CMS/HCC)    • Vasomotor rhinitis      Past Surgical History:   Procedure Laterality Date   • CHOLECYSTECTOMY     • COLONOSCOPY  10/29/2013    diverticulosis   • COLONOSCOPY N/A 7/10/2018    Procedure: COLONOSCOPY WITH ANESTHESIA;  Surgeon: Donald Menchaca DO;  Location: [facility] ENDOSCOPY;  Service: Gastroenterology   • FOOT SURGERY     • TUBAL ABDOMINAL  LIGATION     • TYMPANIC MEMBRANE REPAIR       Family History   Problem Relation Age of Onset   • Diabetes Mother    • Heart disease Mother    • Hypertension Mother    • Hypertension Father    • Diabetes Brother    • Hypertension Brother    • Hyperlipidemia Brother    • Colon polyps Brother    • Hyperlipidemia Maternal Uncle    • Thyroid disease Maternal Uncle    • Cancer Maternal Grandmother    • Colon polyps Daughter      Social History     Socioeconomic History   • Marital status: Single     Spouse name: Not on file   • Number of children: Not on file   • Years of education: Not on file   • Highest education level: Not on file   Tobacco Use   • Smoking status: Never Smoker   • Smokeless tobacco: Never Used   Vaping Use   • Vaping Use: Never used   Substance and Sexual Activity   • Alcohol use: Yes     Comment: occasionally   • Drug use: No   • Sexual activity: Defer     Allergies   Allergen Reactions   • Metformin And Related Swelling     Current Outpatient Medications   Medication Sig Dispense Refill   • Alcohol Swabs (B-D SINGLE USE SWABS REGULAR) pads      • alendronate (FOSAMAX) 70 MG tablet Take 70 mg by mouth Every 7 (Seven) Days.     • allopurinol (ZYLOPRIM) 100 MG tablet Take 100 mg by mouth Daily.     • atorvastatin (LIPITOR) 40 MG tablet Take 40 mg by mouth Daily.     • azelastine (ASTELIN) 0.1 % nasal spray USE 2 SPRAYS IN EACH NOSTRIL TWICE DAILY AS DIRECTED BY YOUR PRESCRIBER 120 mL 3   • Blood Glucose Calibration (True Metrix Level 1) Low solution      • Blood Glucose Monitoring Suppl (True Metrix Meter) w/Device kit      • cetirizine (zyrTEC) 10 MG tablet Take 1 tablet by mouth At Night As Needed for Allergies (sore throat and cough). 30 tablet 0   • cloNIDine (CATAPRES) 0.1 MG tablet Take 1 tablet by mouth Every 6 (Six) Hours As Needed for High Blood Pressure (for blood pressure >160/110). 30 tablet 0   • Glucose Blood (BLOOD GLUCOSE TEST) strip Test blood sugar daily and prn     •  HYDROcodone-acetaminophen (NORCO)  MG per tablet TAKE 1 TABLET BY MOUTH THREE TIMES A DAY  EFFECTIVE DATE 3 21 19  0   • ipratropium (ATROVENT) 0.06 % nasal spray 2 sprays into the nostril(s) as directed by provider 2 (Two) Times a Day. 45 mL 3   • Lancet Devices (TRUEdraw Lancing Device) misc      • lidocaine-prilocaine (EMLA) 2.5-2.5 % cream Apply  topically to the appropriate area as directed As Needed for Mild Pain .     • metoprolol succinate XL (TOPROL-XL) 50 MG 24 hr tablet Take 50 mg by mouth Daily.     • montelukast (SINGULAIR) 10 MG tablet Take 10 mg by mouth Every Night.     • nystatin (MYCOSTATIN) 952300 UNIT/GM powder Apply  topically to the appropriate area as directed 3 (Three) Times a Day. 30 g 0   • olmesartan-hydrochlorothiazide (BENICAR HCT) 40-12.5 MG per tablet      • pantoprazole (PROTONIX) 40 MG EC tablet Take 40 mg by mouth Daily.     • SYNTHROID 100 MCG tablet Take 112 mcg by mouth Daily.     • topiramate (TOPAMAX) 100 MG tablet Take 100 mg by mouth 2 (Two) Times a Day.     • TRUEplus Lancets 33G misc      • vitamin D (ERGOCALCIFEROL) 44842 UNITS capsule capsule Take 50,000 Units by mouth 1 (One) Time Per Week.       No current facility-administered medications for this visit.     Review of Systems   Constitutional: Negative for chills and fever.   HENT: Negative for congestion.    Respiratory: Negative for shortness of breath.    Cardiovascular: Negative for chest pain and leg swelling.   Gastrointestinal: Negative for constipation, diarrhea, nausea and vomiting.   Musculoskeletal: Positive for arthralgias (foot pain).   Skin: Negative for wound.        calluses   Neurological: Positive for numbness.       OBJECTIVE     Vitals:    06/29/21 1111   BP: 144/68   Pulse: 62   SpO2: 97%       PHYSICAL EXAM  GEN:   Accompanied by none.     Foot/Ankle Exam:       General:   Diabetic Foot Exam Performed    Appearance: appears stated age and healthy    Orientation: AAOx3    Affect: appropriate     Gait: unimpaired    Assistance: independent    Shoe Gear:  Casual shoes    VASCULAR      Right Foot Vascularity   Dorsalis pedis:  2+  Posterior tibial:  2+  Skin Temperature: warm    Edema Grading:  None  CFT:  3  Pedal Hair Growth:  Present  Varicosities: none       Left Foot Vascularity   Dorsalis pedis:  2+  Posterior tibial:  2+  Skin Temperature: warm    Edema Grading:  None  CFT:  3  Pedal Hair Growth:  Present  Varicosities: none        NEUROLOGIC     Right Foot Neurologic   Light touch sensation:  Diminished  Vibratory sensation:  Diminished  Hot/Cold sensation: diminished    Protective Sensation using [place] Monofilament:  9     Left Foot Neurologic   Light touch sensation:  Diminished  Vibratory sensation:  Diminished  Hot/cold sensation: diminished    Protective Sensation using [place] Monofilament:  8     MUSCULOSKELETAL      Right Foot Musculoskeletal   Ecchymosis:  None  Tenderness: right foot callus and toenails    Arch:  Normal  Hammertoe:  Fifth toe and second toe  Hallux valgus: Yes    Hallux limitus: No       Left Foot Musculoskeletal   Ecchymosis:  None  Tenderness: left foot callus and toenails    Arch:  Normal  Hammertoe:  Fifth toe  Hallux valgus: No    Hallux limitus: Yes (s/p fusion)       MUSCLE STRENGTH     Right Foot Muscle Strength   Foot dorsiflexion:  5  Foot plantar flexion:  5  Foot inversion:  5  Foot eversion:  5     Left Foot Muscle Strength   Foot dorsiflexion:  5  Foot plantar flexion:  5  Foot inversion:  5  Foot eversion:  5     RANGE OF MOTION      Right Foot Range of Motion   Foot and ankle ROM within normal limits       Left Foot Range of Motion   Foot and ankle ROM within normal limits    1st MTP extension:  0  1st MTP flexion:  0     DERMATOLOGIC     Right Foot Dermatologic   Skin: corn (Plantar medial HIPJ and dorsal lateral 5th toe.)    Nails: onychomycosis, abnormally thick, subungual debris and dystrophic nails       Left Foot Dermatologic    Skin: corn (Plantar medial HIPJ and dorsal lateral 5th toe.)    Nails: onychomycosis, abnormally thick, subungual debris and dystrophic nails        RADIOLOGY/NUCLEAR:  No results found.    LABORATORY/CULTURE RESULTS:      PATHOLOGY RESULTS:       ASSESSMENT/PLAN     Diagnoses and all orders for this visit:    1. Onychomycosis (Primary)    2. Foot callus    3. Hammer toes of both feet    4. Type 2 diabetes mellitus without complication, without long-term current use of insulin (CMS/[facility])    5. Foot pain, bilateral      Comprehensive lower extremity examination and evaluation was performed.  Discussed findings and treatment plan including risks, benefits, and treatment options with patient in detail. Patient agreed with treatment plan.  After verbal consent obtained, nail(s) x10 debrided of length and thickness with nail nipper without incidence  After verbal consent obtained, calluses x4 pared utilizing dermal curette and/or scalpel without incidence  Patient may maintain nails and calluses at home utilizing emery board or pumice stone between visits as needed  Reviewed at home diabetic foot care including daily foot checks   Continue diabetic monitoring and control under direction of PCP   An After Visit Summary was printed and given to the patient at discharge, including (if requested) any available informative/educational handouts regarding diagnosis, treatment, or medications. All questions were answered to patient/family satisfaction. Should symptoms fail to improve or worsen they agree to call or return to clinic or to go to the Emergency Department. Discussed the importance of following up with any needed screening tests/labs/specialist appointments and any requested follow-up recommended by me today. Importance of maintaining follow-up discussed and patient accepts that missed appointments can delay diagnosis and potentially lead to worsening of conditions.  Return in about 3 months (around 9/29/2021)., or  sooner if acute issues arise.    Lab Frequency Next Occurrence       This document has been electronically signed by Charles Hart DPM on June 29, 2021 11:27 CDT

## 2021-06-28 ENCOUNTER — TELEPHONE (OUTPATIENT)
Dept: PODIATRY | Facility: CLINIC | Age: 71
End: 2021-06-28

## 2021-06-29 ENCOUNTER — OFFICE VISIT (OUTPATIENT)
Dept: PODIATRY | Facility: CLINIC | Age: 71
End: 2021-06-29

## 2021-06-29 VITALS
BODY MASS INDEX: 32.53 KG/M2 | OXYGEN SATURATION: 97 % | WEIGHT: 183.6 LBS | HEART RATE: 62 BPM | SYSTOLIC BLOOD PRESSURE: 144 MMHG | DIASTOLIC BLOOD PRESSURE: 68 MMHG | HEIGHT: 63 IN

## 2021-06-29 DIAGNOSIS — E11.9 TYPE 2 DIABETES MELLITUS WITHOUT COMPLICATION, WITHOUT LONG-TERM CURRENT USE OF INSULIN (HCC): ICD-10-CM

## 2021-06-29 DIAGNOSIS — L84 FOOT CALLUS: ICD-10-CM

## 2021-06-29 DIAGNOSIS — B35.1 ONYCHOMYCOSIS: Primary | ICD-10-CM

## 2021-06-29 DIAGNOSIS — M79.671 FOOT PAIN, BILATERAL: ICD-10-CM

## 2021-06-29 DIAGNOSIS — M20.42 HAMMER TOES OF BOTH FEET: ICD-10-CM

## 2021-06-29 DIAGNOSIS — M79.672 FOOT PAIN, BILATERAL: ICD-10-CM

## 2021-06-29 DIAGNOSIS — M20.41 HAMMER TOES OF BOTH FEET: ICD-10-CM

## 2021-06-29 PROCEDURE — 11721 DEBRIDE NAIL 6 OR MORE: CPT | Performed by: PODIATRIST

## 2021-06-29 PROCEDURE — 11056 PARNG/CUTG B9 HYPRKR LES 2-4: CPT | Performed by: PODIATRIST

## 2021-07-29 ENCOUNTER — TELEPHONE (OUTPATIENT)
Dept: PODIATRY | Facility: CLINIC | Age: 71
End: 2021-07-29

## 2021-08-03 ENCOUNTER — OFFICE VISIT (OUTPATIENT)
Dept: OTOLARYNGOLOGY | Facility: CLINIC | Age: 71
End: 2021-08-03

## 2021-08-03 VITALS — HEART RATE: 77 BPM | DIASTOLIC BLOOD PRESSURE: 86 MMHG | TEMPERATURE: 97.5 F | SYSTOLIC BLOOD PRESSURE: 150 MMHG

## 2021-08-03 DIAGNOSIS — J32.9 CHRONIC SINUSITIS, UNSPECIFIED LOCATION: ICD-10-CM

## 2021-08-03 DIAGNOSIS — J30.1 ALLERGIC RHINITIS DUE TO POLLEN, UNSPECIFIED SEASONALITY: Primary | ICD-10-CM

## 2021-08-03 DIAGNOSIS — J06.9 ACUTE UPPER RESPIRATORY INFECTION: ICD-10-CM

## 2021-08-03 DIAGNOSIS — R09.82 PND (POST-NASAL DRIP): ICD-10-CM

## 2021-08-03 PROCEDURE — 99214 OFFICE O/P EST MOD 30 MIN: CPT | Performed by: EMERGENCY MEDICINE

## 2021-08-03 RX ORDER — IPRATROPIUM BROMIDE 42 UG/1
2 SPRAY, METERED NASAL 2 TIMES DAILY
Qty: 15 ML | Refills: 11 | Status: SHIPPED | OUTPATIENT
Start: 2021-08-03 | End: 2022-08-03 | Stop reason: SDUPTHER

## 2021-08-03 RX ORDER — MONTELUKAST SODIUM 10 MG/1
10 TABLET ORAL NIGHTLY
Qty: 30 TABLET | Refills: 11 | Status: SHIPPED | OUTPATIENT
Start: 2021-08-03 | End: 2022-12-24

## 2021-08-03 RX ORDER — AZELASTINE 1 MG/ML
2 SPRAY, METERED NASAL 2 TIMES DAILY
Qty: 30 ML | Refills: 11 | Status: SHIPPED | OUTPATIENT
Start: 2021-08-03 | End: 2021-09-02

## 2021-08-03 RX ORDER — CETIRIZINE HYDROCHLORIDE 10 MG/1
10 TABLET ORAL NIGHTLY PRN
Qty: 30 TABLET | Refills: 11 | Status: SHIPPED | OUTPATIENT
Start: 2021-08-03

## 2021-08-03 NOTE — PATIENT INSTRUCTIONS
CONTACT INFORMATION:  The main office phone number is [phone]. For emergencies after hours and on weekends, this number will convert over to our answering service and the on call provider will answer. Please try to keep non emergent phone calls/ questions to office hours 9am-5pm Monday through Friday.     VCV  As an alternative, you can sign up and use the Epic MyChart system for more direct and quicker access for non emergent questions/ problems.  [facility] VCV allows you to send messages to your doctor, view your test results, renew your prescriptions, schedule appointments, and more. To sign up, go to [employer] and click on the Sign Up Now link in the New User? box. Enter your VCV Activation Code exactly as it appears below along with the last four digits of your Social Security Number and your Date of Birth () to complete the sign-up process. If you do not sign up before the expiration date, you must request a new code.    VCV Activation Code: [email or web address]  Expires: 2021  1:35 PM    If you have questions, you can email [email or web address] or call [phone] to talk to our VCV staff. Remember, VCV is NOT to be used for urgent needs. For medical emergencies, dial 911.

## 2021-08-03 NOTE — PROGRESS NOTES
TONEY Leach     Chief Complaint   Patient presents with   • Allergic Rhinitis          HPI  Camila Wallace is a  71 y.o. female who is here for follow up. She reports her symptoms have remained stable.  She does not consistently use her nasal sprays.  She states when she does use her nasal sprays regularly she has improvement.           Vital Signs:   Temp:  [97.5 °F (36.4 °C)] 97.5 °F (36.4 °C)  Heart Rate:  [77] 77  BP: (150)/(86) 150/86    Physical Exam  Constitutional:       Appearance: She is obese.   HENT:      Head: Normocephalic.      Right Ear: Tympanic membrane, ear canal and external ear normal.      Left Ear: Tympanic membrane, ear canal and external ear normal.      Nose: Septal deviation, mucosal edema and congestion present.      Right Turbinates: Enlarged.      Left Turbinates: Enlarged.      Mouth/Throat:      Lips: Pink.      Mouth: Mucous membranes are dry.      Pharynx: Oropharynx is clear. Uvula midline.   Eyes:      Pupils: Pupils are equal, round, and reactive to light.   Neurological:      Mental Status: She is alert.        Result Review       Procedure        Assessment/Plan     Diagnoses and all orders for this visit:    1. Allergic rhinitis due to pollen, unspecified seasonality (Primary)  -     ipratropium (ATROVENT) 0.06 % nasal spray; 2 sprays into the nostril(s) as directed by provider 2 (Two) Times a Day.  Dispense: 15 mL; Refill: 11  -     azelastine (ASTELIN) 0.1 % nasal spray; 2 sprays into the nostril(s) as directed by provider 2 (Two) Times a Day for 30 days. Use in each nostril as directed  Dispense: 30 mL; Refill: 11    2. Chronic sinusitis, unspecified location  -     ipratropium (ATROVENT) 0.06 % nasal spray; 2 sprays into the nostril(s) as directed by provider 2 (Two) Times a Day.  Dispense: 15 mL; Refill: 11  -     azelastine (ASTELIN) 0.1 % nasal spray; 2 sprays into the nostril(s) as directed by provider 2 (Two) Times a Day for 30 days. Use in each nostril as  directed  Dispense: 30 mL; Refill: 11    3. PND (post-nasal drip)  -     ipratropium (ATROVENT) 0.06 % nasal spray; 2 sprays into the nostril(s) as directed by provider 2 (Two) Times a Day.  Dispense: 15 mL; Refill: 11  -     azelastine (ASTELIN) 0.1 % nasal spray; 2 sprays into the nostril(s) as directed by provider 2 (Two) Times a Day for 30 days. Use in each nostril as directed  Dispense: 30 mL; Refill: 11    4. Acute upper respiratory infection  -     cetirizine (zyrTEC) 10 MG tablet; Take 1 tablet by mouth At Night As Needed for Allergies (sore throat and cough).  Dispense: 30 tablet; Refill: 11    Other orders  -     montelukast (SINGULAIR) 10 MG tablet; Take 1 tablet by mouth Every Night.  Dispense: 30 tablet; Refill: 11            Continue nasal sprays as previously prescribed.     Return in about 1 year (around 8/3/2022) for Follow up with TONEY Dominguez.         TONEY Leach  08/03/21  13:35 CDT

## 2021-09-28 NOTE — PROGRESS NOTES
[facility] - PODIATRY    Today's Date: 09/30/21    Patient Name: Camila Wallace  MRN: [id number]  CSN: [id number]  PCP: Sandra Wells PA  Referring Provider: No ref. provider found    SUBJECTIVE     Chief Complaint   Patient presents with   • Follow-up     pcp06/09/2021 3 month fu diabetic nail care- pt states feet doing ok, has a spot on inside edge of great toe right foot by nail gets hard and hurts, corns hurt- pt denies pain at present- pt presents with long nails, bunnions/corns on inside of great toes   • Diabetes     121mg/dl BG one day last week     HPI: Camila Wallace, a 71 y.o.female, comes to clinic as a(n) established patient presenting for diabetic foot exam, complaining of painful toenails and calluses. Patient has h/o farida bullosa, thyroid disease, GERD, Graves Disease, HLD, HTN, FAUSTO, DM2. Patient is NIDDM with last stated BG level of 121mg/dl.  Relates only occasional numbness/tingling in feet.  Denies open wounds or sores. States that her toenails are long, thick and discolored. Relates some tenderness a few calluses to each foot. Denies pain currently. Relates previous treatment(s) including care by podiatry. Denies any constitutional symptoms. No other pedal complaints at this time.    Past Medical History:   Diagnosis Date   • Allergic rhinitis    • Chronic sinusitis    • Farida bullosa    • Disease of thyroid gland    • Diverticulosis 2013   • GERD (gastroesophageal reflux disease)    • Graves disease    • HLD (hyperlipidemia)    • HTN (hypertension)    • Obstructive sleep apnea    • Type 2 diabetes mellitus (CMS/HCC)    • Vasomotor rhinitis      Past Surgical History:   Procedure Laterality Date   • CHOLECYSTECTOMY     • COLONOSCOPY  10/29/2013    diverticulosis   • COLONOSCOPY N/A 7/10/2018    Procedure: COLONOSCOPY WITH ANESTHESIA;  Surgeon: Donald Menchaca DO;  Location: [facility] ENDOSCOPY;  Service: Gastroenterology   • FOOT SURGERY     • TUBAL ABDOMINAL  LIGATION     • TYMPANIC MEMBRANE REPAIR       Family History   Problem Relation Age of Onset   • Diabetes Mother    • Heart disease Mother    • Hypertension Mother    • Hypertension Father    • Diabetes Brother    • Hypertension Brother    • Hyperlipidemia Brother    • Colon polyps Brother    • Hyperlipidemia Maternal Uncle    • Thyroid disease Maternal Uncle    • Cancer Maternal Grandmother    • Colon polyps Daughter      Social History     Socioeconomic History   • Marital status: Single     Spouse name: Not on file   • Number of children: Not on file   • Years of education: Not on file   • Highest education level: Not on file   Tobacco Use   • Smoking status: Never Smoker   • Smokeless tobacco: Never Used   Vaping Use   • Vaping Use: Never used   Substance and Sexual Activity   • Alcohol use: Yes     Comment: occasionally   • Drug use: No   • Sexual activity: Defer     Allergies   Allergen Reactions   • Metformin And Related Swelling     Current Outpatient Medications   Medication Sig Dispense Refill   • Alcohol Swabs (B-D SINGLE USE SWABS REGULAR) pads      • alendronate (FOSAMAX) 70 MG tablet Take 70 mg by mouth Every 7 (Seven) Days.     • allopurinol (ZYLOPRIM) 100 MG tablet Take 100 mg by mouth Daily.     • atorvastatin (LIPITOR) 80 MG tablet Take 80 mg by mouth Daily.     • Blood Glucose Calibration (True Metrix Level 1) Low solution      • Blood Glucose Monitoring Suppl (True Metrix Meter) w/Device kit      • cetirizine (zyrTEC) 10 MG tablet Take 1 tablet by mouth At Night As Needed for Allergies (sore throat and cough). 30 tablet 11   • cloNIDine (CATAPRES) 0.1 MG tablet Take 1 tablet by mouth Every 6 (Six) Hours As Needed for High Blood Pressure (for blood pressure >160/110). 30 tablet 0   • Glucose Blood (BLOOD GLUCOSE TEST) strip Test blood sugar daily and prn     • HYDROcodone-acetaminophen (NORCO)  MG per tablet TAKE 1 TABLET BY MOUTH THREE TIMES A DAY  EFFECTIVE DATE 3 21 19  0   • ipratropium  (ATROVENT) 0.06 % nasal spray 2 sprays into the nostril(s) as directed by provider 2 (Two) Times a Day. 15 mL 11   • Lancet Devices (TRUEdraw Lancing Device) misc      • lidocaine-prilocaine (EMLA) 2.5-2.5 % cream Apply  topically to the appropriate area as directed As Needed for Mild Pain .     • metoprolol succinate XL (TOPROL-XL) 100 MG 24 hr tablet Take 100 mg by mouth Daily.     • montelukast (SINGULAIR) 10 MG tablet Take 1 tablet by mouth Every Night. 30 tablet 11   • olmesartan-hydrochlorothiazide (BENICAR HCT) 40-12.5 MG per tablet      • pantoprazole (PROTONIX) 40 MG EC tablet Take 40 mg by mouth Daily.     • SYNTHROID 100 MCG tablet Take 112 mcg by mouth Daily.     • topiramate (TOPAMAX) 100 MG tablet Take 100 mg by mouth 2 (Two) Times a Day.     • triamcinolone (KENALOG) 0.1 % cream Apply twice daily as needed for 2 weeks 80 g 0   • TRUEplus Lancets 33G misc      • vitamin D (ERGOCALCIFEROL) 90944 UNITS capsule capsule Take 50,000 Units by mouth 1 (One) Time Per Week.       No current facility-administered medications for this visit.     Review of Systems   Constitutional: Negative for chills and fever.   HENT: Negative for congestion.    Respiratory: Negative for shortness of breath.    Cardiovascular: Negative for chest pain and leg swelling.   Gastrointestinal: Negative for constipation, diarrhea, nausea and vomiting.   Musculoskeletal: Positive for arthralgias (foot pain).   Skin: Negative for wound.        calluses   Neurological: Positive for numbness.       OBJECTIVE     Vitals:    09/30/21 1054   Pulse: 79   SpO2: 99%       PHYSICAL EXAM  GEN:   Accompanied by none.     Foot/Ankle Exam:       General:   Diabetic Foot Exam Performed    Appearance: appears stated age and healthy    Orientation: AAOx3    Affect: appropriate    Gait: unimpaired    Assistance: independent    Shoe Gear:  Casual shoes    VASCULAR      Right Foot Vascularity   Dorsalis pedis:  2+  Posterior tibial:  2+  Skin Temperature:  warm    Edema Grading:  None  CFT:  3  Pedal Hair Growth:  Present  Varicosities: none       Left Foot Vascularity   Dorsalis pedis:  2+  Posterior tibial:  2+  Skin Temperature: warm    Edema Grading:  None  CFT:  3  Pedal Hair Growth:  Present  Varicosities: none        NEUROLOGIC     Right Foot Neurologic   Light touch sensation:  Diminished  Vibratory sensation:  Diminished  Hot/Cold sensation: diminished    Protective Sensation using [place] Monofilament:  8     Left Foot Neurologic   Light touch sensation:  Diminished  Vibratory sensation:  Diminished  Hot/cold sensation: diminished    Protective Sensation using [place]isa Monofilament:  8     MUSCULOSKELETAL      Right Foot Musculoskeletal   Ecchymosis:  None  Tenderness: great toe metatarsophalangeal joint, right foot callus and toenails    Arch:  Normal  Hammertoe:  Fifth toe and second toe (2nd toe is starting override hallux)  Hallux valgus: Yes (Medial bony eminence with abduction of hallux)    Hallux limitus: No       Left Foot Musculoskeletal   Ecchymosis:  None  Tenderness: left foot callus and toenails    Arch:  Normal  Hammertoe:  Fifth toe  Hallux valgus: No    Hallux limitus: Yes (s/p fusion)       MUSCLE STRENGTH     Right Foot Muscle Strength   Foot dorsiflexion:  5  Foot plantar flexion:  5  Foot inversion:  5  Foot eversion:  5     Left Foot Muscle Strength   Foot dorsiflexion:  5  Foot plantar flexion:  5  Foot inversion:  5  Foot eversion:  5     RANGE OF MOTION      Right Foot Range of Motion   Foot and ankle ROM within normal limits       Left Foot Range of Motion   Foot and ankle ROM within normal limits    1st MTP extension:  0  1st MTP flexion:  0     DERMATOLOGIC     Right Foot Dermatologic   Skin: corn (Plantar medial HIPJ and dorsal lateral 5th toe.)    Nails: onychomycosis, abnormally thick, subungual debris and dystrophic nails       Left Foot Dermatologic   Skin: corn (Plantar medial HIPJ and dorsal lateral 5th  toe.)    Nails: onychomycosis, abnormally thick, subungual debris and dystrophic nails        RADIOLOGY/NUCLEAR:  No results found.    LABORATORY/CULTURE RESULTS:      PATHOLOGY RESULTS:       ASSESSMENT/PLAN     Diagnoses and all orders for this visit:    1. Onychomycosis (Primary)    2. Type 2 diabetes mellitus without complication, without long-term current use of insulin (HCC)    3. Foot callus    4. Hallux valgus, right    5. Hammer toes of both feet    6. Foot pain, bilateral      Comprehensive lower extremity examination and evaluation was performed.  Discussed findings and treatment plan including risks, benefits, and treatment options with patient in detail. Patient agreed with treatment plan.  After verbal consent obtained, nail(s) x10 debrided of length and thickness with nail nipper without incidence  After verbal consent obtained, calluses x4 pared utilizing dermal curette and/or scalpel without incidence  Patient may maintain nails and calluses at home utilizing emery board or pumice stone between visits as needed  Reviewed at home diabetic foot care including daily foot checks   Continue diabetic monitoring and control under direction of PCP   Discussed foot deformities and potential surgical options. For now will remain conservative with wider shoes and padding.   An After Visit Summary was printed and given to the patient at discharge, including (if requested) any available informative/educational handouts regarding diagnosis, treatment, or medications. All questions were answered to patient/family satisfaction. Should symptoms fail to improve or worsen they agree to call or return to clinic or to go to the Emergency Department. Discussed the importance of following up with any needed screening tests/labs/specialist appointments and any requested follow-up recommended by me today. Importance of maintaining follow-up discussed and patient accepts that missed appointments can delay diagnosis and  potentially lead to worsening of conditions.  Return in about 3 months (around 12/30/2021) for Follow-up with Avery ZIEGLER, Diabetic Foot Exam., or sooner if acute issues arise.    Lab Frequency Next Occurrence       This document has been electronically signed by Charles Hart DPM on September 30, 2021 11:22 CDT

## 2021-09-30 ENCOUNTER — OFFICE VISIT (OUTPATIENT)
Dept: PODIATRY | Facility: CLINIC | Age: 71
End: 2021-09-30

## 2021-09-30 VITALS — WEIGHT: 182.8 LBS | BODY MASS INDEX: 32.39 KG/M2 | HEIGHT: 63 IN | HEART RATE: 79 BPM | OXYGEN SATURATION: 99 %

## 2021-09-30 DIAGNOSIS — M79.671 FOOT PAIN, BILATERAL: ICD-10-CM

## 2021-09-30 DIAGNOSIS — M20.41 HAMMER TOES OF BOTH FEET: ICD-10-CM

## 2021-09-30 DIAGNOSIS — B35.1 ONYCHOMYCOSIS: Primary | ICD-10-CM

## 2021-09-30 DIAGNOSIS — M79.672 FOOT PAIN, BILATERAL: ICD-10-CM

## 2021-09-30 DIAGNOSIS — M20.11 HALLUX VALGUS, RIGHT: ICD-10-CM

## 2021-09-30 DIAGNOSIS — M20.42 HAMMER TOES OF BOTH FEET: ICD-10-CM

## 2021-09-30 DIAGNOSIS — L84 FOOT CALLUS: ICD-10-CM

## 2021-09-30 DIAGNOSIS — E11.9 TYPE 2 DIABETES MELLITUS WITHOUT COMPLICATION, WITHOUT LONG-TERM CURRENT USE OF INSULIN (HCC): ICD-10-CM

## 2021-09-30 PROCEDURE — 11056 PARNG/CUTG B9 HYPRKR LES 2-4: CPT | Performed by: PODIATRIST

## 2021-09-30 PROCEDURE — 99213 OFFICE O/P EST LOW 20 MIN: CPT | Performed by: PODIATRIST

## 2021-09-30 PROCEDURE — 11721 DEBRIDE NAIL 6 OR MORE: CPT | Performed by: PODIATRIST

## 2021-09-30 NOTE — PATIENT INSTRUCTIONS
Bunion  A bunion (hallux valgus) is a bump that forms slowly on the inner side of the big toe joint. It occurs when the big toe turns toward the second toe. Bunions may be small at first, but they often get larger over time. They can make walking painful.  What are the causes?  This condition may be caused by:  · Wearing narrow or pointed shoes that force the big toe to press against the other toes.  · Abnormal foot development that causes the foot to roll inward.  · Changes in the foot that are caused by certain diseases, such as rheumatoid arthritis or polio.  · A foot injury.  What increases the risk?  The following factors may make you more likely to develop this condition:  · Wearing shoes that squeeze the toes together.  · Having certain diseases, such as:  ? Rheumatoid arthritis.  ? Polio.  ? Cerebral palsy.  · Having family members who have bunions.  · Being born with abnormally shaped feet (a foot deformity), such as flat feet or low arches.  · Doing activities that put a lot of pressure on the feet, such as ballet dancing.  What are the signs or symptoms?    The main symptom of this condition is a bump on your big toe that you can notice.  Other symptoms may include:  · Pain.  · Redness and inflammation around your big toe.  · Thick or hardened skin on your big toe or between your toes.  · Stiffness or loss of motion in your big toe.  · Trouble with walking.  How is this diagnosed?  This condition may be diagnosed based on your symptoms, medical history, and activities. You may also have tests and imaging, such as:  · X-rays. These allow your health care provider to check the position of the bones in your foot and look for damage to your joint. They also help your health care provider determine the severity of your bunion and the best way to treat it.  · Joint aspiration. In this test, a sample of fluid is removed from the toe joint. This test may be done if you are in a lot of pain. It helps rule out  diseases that cause painful swelling of the joints, such as arthritis or gout.  How is this treated?  Treatment depends on the severity of your symptoms. The goal of treatment is to relieve symptoms and prevent your bunion from getting worse. Your health care provider may recommend:  · Wearing shoes that have a wide toe box, or using bunion pads to cushion the affected area.  · Taping your toes together to keep them in a normal position.  · Placing a device inside your shoe (orthotic device) to help reduce pressure on your toe joint.  · Taking medicine to ease pain and inflammation.  · Putting ice or heat on the affected area.  · Doing stretching exercises.  · Surgery, for severe cases.  Follow these instructions at home:  Managing pain, stiffness, and swelling         · If directed, put ice on the painful area. To do this:  ? Put ice in a plastic bag.  ? Place a towel between your skin and the bag.  ? Leave the ice on for 20 minutes, 2-3 times a day.  ? Remove the ice if your skin turns bright red. This is very important. If you cannot feel pain, heat, or cold, you have a greater risk of damage to the area.  · If directed, apply heat to the affected area before you exercise. Use the heat source that your health care provider recommends, such as a moist heat pack or a heating pad.  ? Place a towel between your skin and the heat source.  ? Leave the heat on for 20-30 minutes.  ? Remove the heat if your skin turns bright red. This is especially important if you are unable to feel pain, heat, or cold. You have a greater risk of getting burned.  General instructions  · Do exercises as told by your health care provider.  · Support your toe joint with proper footwear, shoe padding, or taping as told by your health care provider.  · Take over-the-counter and prescription medicines only as told by your health care provider.  · Do not use any products that contain nicotine or tobacco, such as cigarettes, e-cigarettes, and  chewing tobacco. If you need help quitting, ask your health care provider.  · Keep all follow-up visits. This is important.  Contact a health care provider if:  · Your symptoms get worse.  · Your symptoms do not improve in 2 weeks.  Get help right away if:  · You have severe pain and trouble with walking.  Summary  · A bunion is a bump on the inner side of the big toe joint that forms when the big toe turns toward the second toe.  · Bunions can make walking painful.  · Treatment depends on the severity of your symptoms.  · Support your toe joint with proper footwear, shoe padding, or taping as told by your health care provider.  This information is not intended to replace advice given to you by your health care provider. Make sure you discuss any questions you have with your health care provider.  Document Revised: 04/23/2021 Document Reviewed: 04/23/2021  [employer]vier Patient Education © 2021 [employer]

## 2021-10-01 ENCOUNTER — TELEPHONE (OUTPATIENT)
Dept: PODIATRY | Facility: CLINIC | Age: 71
End: 2021-10-01

## 2021-10-01 NOTE — TELEPHONE ENCOUNTER
Voice mail was not available. Pt's appointment was scheduled for 1/3/22 it had to be moved to 01/7/22.

## 2022-01-06 ENCOUNTER — TELEPHONE (OUTPATIENT)
Dept: PODIATRY | Facility: CLINIC | Age: 72
End: 2022-01-06

## 2022-01-08 PROCEDURE — 87491 CHLMYD TRACH DNA AMP PROBE: CPT | Performed by: NURSE PRACTITIONER

## 2022-01-08 PROCEDURE — 87661 TRICHOMONAS VAGINALIS AMPLIF: CPT | Performed by: NURSE PRACTITIONER

## 2022-01-08 PROCEDURE — 87086 URINE CULTURE/COLONY COUNT: CPT | Performed by: NURSE PRACTITIONER

## 2022-01-08 PROCEDURE — 87591 N.GONORRHOEAE DNA AMP PROB: CPT | Performed by: NURSE PRACTITIONER

## 2022-01-28 ENCOUNTER — OFFICE VISIT (OUTPATIENT)
Dept: PODIATRY | Facility: CLINIC | Age: 72
End: 2022-01-28

## 2022-01-28 VITALS
HEIGHT: 63 IN | HEART RATE: 66 BPM | BODY MASS INDEX: 33.38 KG/M2 | WEIGHT: 188.4 LBS | SYSTOLIC BLOOD PRESSURE: 130 MMHG | DIASTOLIC BLOOD PRESSURE: 66 MMHG | OXYGEN SATURATION: 100 %

## 2022-01-28 DIAGNOSIS — B35.1 ONYCHOMYCOSIS: Primary | ICD-10-CM

## 2022-01-28 DIAGNOSIS — M79.671 FOOT PAIN, BILATERAL: ICD-10-CM

## 2022-01-28 DIAGNOSIS — M79.672 FOOT PAIN, BILATERAL: ICD-10-CM

## 2022-01-28 DIAGNOSIS — M19.071 ARTHRITIS OF RIGHT FOOT: ICD-10-CM

## 2022-01-28 DIAGNOSIS — E11.9 TYPE 2 DIABETES MELLITUS WITHOUT COMPLICATION, WITHOUT LONG-TERM CURRENT USE OF INSULIN: ICD-10-CM

## 2022-01-28 DIAGNOSIS — M20.41 HAMMER TOES OF BOTH FEET: ICD-10-CM

## 2022-01-28 DIAGNOSIS — E11.9 ENCOUNTER FOR DIABETIC FOOT EXAM: ICD-10-CM

## 2022-01-28 DIAGNOSIS — M20.11 HALLUX VALGUS, RIGHT: ICD-10-CM

## 2022-01-28 DIAGNOSIS — M20.42 HAMMER TOES OF BOTH FEET: ICD-10-CM

## 2022-01-28 DIAGNOSIS — L84 FOOT CALLUS: ICD-10-CM

## 2022-01-28 PROCEDURE — 11056 PARNG/CUTG B9 HYPRKR LES 2-4: CPT | Performed by: NURSE PRACTITIONER

## 2022-01-28 PROCEDURE — 99213 OFFICE O/P EST LOW 20 MIN: CPT | Performed by: NURSE PRACTITIONER

## 2022-01-28 PROCEDURE — 11721 DEBRIDE NAIL 6 OR MORE: CPT | Performed by: NURSE PRACTITIONER

## 2022-01-28 NOTE — PROGRESS NOTES
[facility] - PODIATRY    Today's Date: 01/28/22    Patient Name: Camila Wallace  MRN: [id number]  CSN: [id number]  PCP: Sandra Wells PA  Referring Provider: No ref. provider found    SUBJECTIVE     Chief Complaint   Patient presents with   • Follow-up     Sandra Wells PA pcp06/09/2021 3 month diabetic foot/nail care- pt states 2nd toe right foot been hurting a little ddue to hammer toe, left foot 2nd toe hurts from previous surgery by dr guerra, nails need trimmed , corns hurt- pt denies pain at present. pain sometimes when walking in shoes with corns and callusis- pt presents with long thick nails, corn/callusis on outside of both small toes and inside of both great toes, bunion on right foot inside great toe   • Diabetes     hasnt checked     HPI: Camila Wallace, a 71 y.o.female, comes to clinic as a(n) established patient presenting for diabetic foot exam, complaining of foot pain, complaining of painful toenails and calluses. Patient has h/o farida bullosa, thyroid disease, GERD, Graves Disease, HLD, HTN, FAUSTO, DM2. Patient is NIDDM and unsure of last BG level.  Relates only occasional numbness/tingling in feet.  Denies open wounds or sores. States that her toenails are long, thick and discolored. Relates recurrence of tenderness calluses to each foot. States that she occasionally has pain from the 2nd toe of the right foot within MTPJ and IPJ.  Denies pain currently. Relates previous treatment(s) including care by podiatry. Tries tow work on calluses at home between visits. Denies any constitutional symptoms. No other pedal complaints at this time.    Past Medical History:   Diagnosis Date   • Allergic rhinitis    • Chronic sinusitis    • Farida bullosa    • Disease of thyroid gland    • Diverticulosis 2013   • GERD (gastroesophageal reflux disease)    • Graves disease    • HLD (hyperlipidemia)    • HTN (hypertension)    • Obstructive sleep apnea    • Type 2 diabetes  mellitus (HCC)    • Vasomotor rhinitis      Past Surgical History:   Procedure Laterality Date   • CHOLECYSTECTOMY     • COLONOSCOPY  10/29/2013    diverticulosis   • COLONOSCOPY N/A 7/10/2018    Procedure: COLONOSCOPY WITH ANESTHESIA;  Surgeon: Donald Menchaca DO;  Location: [facility] ENDOSCOPY;  Service: Gastroenterology   • FOOT SURGERY     • TUBAL ABDOMINAL LIGATION     • TYMPANIC MEMBRANE REPAIR       Family History   Problem Relation Age of Onset   • Diabetes Mother    • Heart disease Mother    • Hypertension Mother    • Hypertension Father    • Diabetes Brother    • Hypertension Brother    • Hyperlipidemia Brother    • Colon polyps Brother    • Hyperlipidemia Maternal Uncle    • Thyroid disease Maternal Uncle    • Cancer Maternal Grandmother    • Colon polyps Daughter      Social History     Socioeconomic History   • Marital status: Single   Tobacco Use   • Smoking status: Never Smoker   • Smokeless tobacco: Never Used   Vaping Use   • Vaping Use: Never used   Substance and Sexual Activity   • Alcohol use: Yes     Comment: occasionally   • Drug use: No   • Sexual activity: Defer     Allergies   Allergen Reactions   • Metformin And Related Swelling     Current Outpatient Medications   Medication Sig Dispense Refill   • Alcohol Swabs (B-D SINGLE USE SWABS REGULAR) pads      • alendronate (FOSAMAX) 70 MG tablet Take 70 mg by mouth Every 7 (Seven) Days.     • allopurinol (ZYLOPRIM) 100 MG tablet Take 100 mg by mouth Daily.     • atorvastatin (LIPITOR) 80 MG tablet Take 80 mg by mouth Daily.     • Blood Glucose Calibration (True Metrix Level 1) Low solution      • Blood Glucose Monitoring Suppl (True Metrix Meter) w/Device kit      • cetirizine (zyrTEC) 10 MG tablet Take 1 tablet by mouth At Night As Needed for Allergies (sore throat and cough). 30 tablet 11   • cloNIDine (CATAPRES) 0.1 MG tablet Take 1 tablet by mouth Every 6 (Six) Hours As Needed for High Blood Pressure (for blood pressure >160/110). 30 tablet 0    • Glucose Blood (BLOOD GLUCOSE TEST) strip Test blood sugar daily and prn     • HYDROcodone-acetaminophen (NORCO)  MG per tablet TAKE 1 TABLET BY MOUTH THREE TIMES A DAY  EFFECTIVE DATE 3 21 19  0   • ipratropium (ATROVENT) 0.06 % nasal spray 2 sprays into the nostril(s) as directed by provider 2 (Two) Times a Day. 15 mL 11   • Lancet Devices (TRUEdraw Lancing Device) misc      • lidocaine-prilocaine (EMLA) 2.5-2.5 % cream Apply  topically to the appropriate area as directed As Needed for Mild Pain .     • metoprolol succinate XL (TOPROL-XL) 100 MG 24 hr tablet Take 100 mg by mouth Daily.     • montelukast (SINGULAIR) 10 MG tablet Take 1 tablet by mouth Every Night. 30 tablet 11   • olmesartan-hydrochlorothiazide (BENICAR HCT) 40-12.5 MG per tablet      • pantoprazole (PROTONIX) 40 MG EC tablet Take 40 mg by mouth Daily.     • SYNTHROID 100 MCG tablet Take 112 mcg by mouth Daily.     • topiramate (TOPAMAX) 100 MG tablet Take 100 mg by mouth 2 (Two) Times a Day.     • triamcinolone (KENALOG) 0.1 % cream Apply twice daily as needed for 2 weeks 80 g 0   • TRUEplus Lancets 33G misc      • vitamin D (ERGOCALCIFEROL) 34423 UNITS capsule capsule Take 50,000 Units by mouth 1 (One) Time Per Week.       No current facility-administered medications for this visit.     Review of Systems   Constitutional: Negative for chills and fever.   HENT: Negative for congestion.    Respiratory: Negative for shortness of breath.    Cardiovascular: Negative for chest pain and leg swelling.   Gastrointestinal: Negative for constipation, diarrhea, nausea and vomiting.   Musculoskeletal: Positive for arthralgias (foot pain).   Skin: Negative for wound.        calluses   Neurological: Positive for numbness.       OBJECTIVE     Vitals:    01/28/22 1055   BP: 130/66   Pulse: 66   SpO2: 100%       PHYSICAL EXAM  GEN:   Accompanied by none.     Foot/Ankle Exam:       General:   Diabetic Foot Exam Performed    Appearance: appears stated age  and healthy    Orientation: AAOx3    Affect: appropriate    Gait: unimpaired    Assistance: independent    Shoe Gear:  Casual shoes    VASCULAR      Right Foot Vascularity   Dorsalis pedis:  2+  Posterior tibial:  2+  Skin Temperature: warm    Edema Grading:  None  CFT:  3  Pedal Hair Growth:  Present  Varicosities: none       Left Foot Vascularity   Dorsalis pedis:  2+  Posterior tibial:  2+  Skin Temperature: warm    Edema Grading:  None  CFT:  3  Pedal Hair Growth:  Present  Varicosities: none        NEUROLOGIC     Right Foot Neurologic   Light touch sensation:  Diminished  Vibratory sensation:  Diminished  Hot/Cold sensation: diminished    Protective Sensation using [place]arcisa Monofilament:  7     Left Foot Neurologic   Light touch sensation:  Diminished  Vibratory sensation:  Diminished  Hot/cold sensation: diminished    Protective Sensation using [place]-Narcisa Monofilament:  7     MUSCULOSKELETAL      Right Foot Musculoskeletal   Ecchymosis:  None  Tenderness: right foot callus, toenails and toe 2    Arch:  Normal  Hammertoe:  Fifth toe and second toe (2nd toe is starting override hallux)  Hallux valgus: Yes (Medial bony eminence with abduction of hallux)    Hallux limitus: No       Left Foot Musculoskeletal   Ecchymosis:  None  Tenderness: left foot callus and toenails    Arch:  Normal  Hammertoe:  Fifth toe  Hallux valgus: No    Hallux limitus: Yes (s/p fusion)       MUSCLE STRENGTH     Right Foot Muscle Strength   Foot dorsiflexion:  5  Foot plantar flexion:  5  Foot inversion:  5  Foot eversion:  5     Left Foot Muscle Strength   Foot dorsiflexion:  5  Foot plantar flexion:  5  Foot inversion:  5  Foot eversion:  5     RANGE OF MOTION      Right Foot Range of Motion   Foot and ankle ROM within normal limits       Left Foot Range of Motion   Foot and ankle ROM within normal limits    1st MTP extension:  0  1st MTP flexion:  0     DERMATOLOGIC     Right Foot Dermatologic   Skin: corn (Plantar medial  HIPJ and dorsal lateral 5th toe.)    Nails: onychomycosis, abnormally thick, subungual debris and dystrophic nails       Left Foot Dermatologic   Skin: corn (Plantar medial HIPJ and dorsal lateral 5th toe.)    Nails: onychomycosis, abnormally thick, subungual debris and dystrophic nails       Image:       RADIOLOGY/NUCLEAR:  No results found.    LABORATORY/CULTURE RESULTS:      PATHOLOGY RESULTS:       ASSESSMENT/PLAN     Diagnoses and all orders for this visit:    1. Onychomycosis (Primary)    2. Foot callus    3. Foot pain, bilateral    4. Type 2 diabetes mellitus without complication, without long-term current use of insulin ([facility])    5. Encounter for diabetic foot exam ([facility])    6. Hallux valgus, right    7. Hammer toes of both feet    8. Arthritis of right foot      Comprehensive lower extremity examination and evaluation was performed.  Discussed findings and treatment plan including risks, benefits, and treatment options with patient in detail. Patient agreed with treatment plan.  Diabetic foot exam performed.  After verbal consent obtained, nail(s) x10 debrided of length and thickness with nail nipper without incidence  After verbal consent obtained, calluses x4 pared utilizing dermal curette and/or scalpel without incidence  Patient may maintain nails and calluses at home utilizing emery board or pumice stone between visits as needed  Reviewed at home diabetic foot care including daily foot checks   Continue diabetic monitoring and control under direction of PCP   Remain conservative with wider shoes and padding.   May use voltaren gel on tender areas of the right foot from arthritic changes.  An After Visit Summary was printed and given to the patient at discharge, including (if requested) any available informative/educational handouts regarding diagnosis, treatment, or medications. All questions were answered to patient/family satisfaction. Should symptoms fail to improve or worsen they agree to call or  return to clinic or to go to the Emergency Department. Discussed the importance of following up with any needed screening tests/labs/specialist appointments and any requested follow-up recommended by me today. Importance of maintaining follow-up discussed and patient accepts that missed appointments can delay diagnosis and potentially lead to worsening of conditions.  Return in about 3 months (around 4/28/2022)., or sooner if acute issues arise.    Lab Frequency Next Occurrence       This document has been electronically signed by TONEY Connor on January 28, 2022 11:13 CST

## 2022-04-28 NOTE — PROGRESS NOTES
[facility] - PODIATRY    Today's Date: 04/29/22    Patient Name: Camila Wallace  MRN: [id number]  CSN: [id number]  PCP: Sandra Wells PA  Referring Provider: No ref. provider found    SUBJECTIVE     Chief Complaint   Patient presents with   • Follow-up     Sandra Wells PA pcp06/09/2021 3 MO FU DIABETIC FOOT CARE - pt states callus on the side of great left toe is causing some pain but doing ok - pt denies pain - pt presents routine diabetic nail and foot care, thick toenails    • Diabetes     98mg/dl couple days ago      HPI: Camila Wallace, a 72 y.o.female, comes to clinic as a(n) established patient presenting for diabetic foot exam, complaining of foot pain, complaining of painful toenails and calluses. Patient has h/o farida bullosa, thyroid disease, GERD, Graves Disease, HLD, HTN, FAUSTO, DM2. Patient is NIDDM with last stated BG level of 98mg/dl.  Relates only occasional numbness/tingling in feet.  Denies open wounds or sores. States that her toenails are long, thick and discolored.  Notes recurrence of tenderness calluses to each foot.  Denies pain currently. Relates previous treatment(s) including care by podiatry. Denies any constitutional symptoms. No other pedal complaints at this time.    Past Medical History:   Diagnosis Date   • Allergic rhinitis    • Chronic sinusitis    • Farida bullosa    • Disease of thyroid gland    • Diverticulosis 2013   • GERD (gastroesophageal reflux disease)    • Graves disease    • HLD (hyperlipidemia)    • HTN (hypertension)    • Obstructive sleep apnea    • Type 2 diabetes mellitus (HCC)    • Vasomotor rhinitis      Past Surgical History:   Procedure Laterality Date   • CHOLECYSTECTOMY     • COLONOSCOPY  10/29/2013    diverticulosis   • COLONOSCOPY N/A 7/10/2018    Procedure: COLONOSCOPY WITH ANESTHESIA;  Surgeon: Donald Menchaca DO;  Location: [facility] ENDOSCOPY;  Service: Gastroenterology   • FOOT SURGERY     • TUBAL ABDOMINAL LIGATION      • TYMPANIC MEMBRANE REPAIR       Family History   Problem Relation Age of Onset   • Diabetes Mother    • Heart disease Mother    • Hypertension Mother    • Hypertension Father    • Diabetes Brother    • Hypertension Brother    • Hyperlipidemia Brother    • Colon polyps Brother    • Hyperlipidemia Maternal Uncle    • Thyroid disease Maternal Uncle    • Cancer Maternal Grandmother    • Colon polyps Daughter      Social History     Socioeconomic History   • Marital status: Single   Tobacco Use   • Smoking status: Never Smoker   • Smokeless tobacco: Never Used   Vaping Use   • Vaping Use: Never used   Substance and Sexual Activity   • Alcohol use: Yes     Comment: occasionally   • Drug use: No   • Sexual activity: Defer     Allergies   Allergen Reactions   • Metformin And Related Swelling     Current Outpatient Medications   Medication Sig Dispense Refill   • Alcohol Swabs (B-D SINGLE USE SWABS REGULAR) pads      • alendronate (FOSAMAX) 70 MG tablet Take 70 mg by mouth Every 7 (Seven) Days.     • allopurinol (ZYLOPRIM) 100 MG tablet Take 100 mg by mouth Daily.     • atorvastatin (LIPITOR) 80 MG tablet Take 80 mg by mouth Daily.     • Blood Glucose Calibration (True Metrix Level 1) Low solution      • Blood Glucose Monitoring Suppl (True Metrix Meter) w/Device kit      • cetirizine (zyrTEC) 10 MG tablet Take 1 tablet by mouth At Night As Needed for Allergies (sore throat and cough). 30 tablet 11   • cloNIDine (CATAPRES) 0.1 MG tablet Take 1 tablet by mouth Every 6 (Six) Hours As Needed for High Blood Pressure (for blood pressure >160/110). 30 tablet 0   • Glucose Blood (BLOOD GLUCOSE TEST) strip Test blood sugar daily and prn     • HYDROcodone-acetaminophen (NORCO)  MG per tablet TAKE 1 TABLET BY MOUTH THREE TIMES A DAY  EFFECTIVE DATE 3 21 19  0   • ipratropium (ATROVENT) 0.06 % nasal spray 2 sprays into the nostril(s) as directed by provider 2 (Two) Times a Day. 15 mL 11   • Lancet Devices (docTrackrdraw Lancing  Device) misc      • lidocaine-prilocaine (EMLA) 2.5-2.5 % cream Apply  topically to the appropriate area as directed As Needed for Mild Pain .     • metoprolol succinate XL (TOPROL-XL) 100 MG 24 hr tablet Take 100 mg by mouth Daily.     • montelukast (SINGULAIR) 10 MG tablet Take 1 tablet by mouth Every Night. 30 tablet 11   • olmesartan-hydrochlorothiazide (BENICAR HCT) 40-12.5 MG per tablet      • pantoprazole (PROTONIX) 40 MG EC tablet Take 40 mg by mouth Daily.     • SYNTHROID 100 MCG tablet Take 112 mcg by mouth Daily.     • topiramate (TOPAMAX) 100 MG tablet Take 100 mg by mouth 2 (Two) Times a Day.     • triamcinolone (KENALOG) 0.1 % cream Apply twice daily as needed for 2 weeks 80 g 0   • TRUEplus Lancets 33G misc      • vitamin D (ERGOCALCIFEROL) 28761 UNITS capsule capsule Take 50,000 Units by mouth 1 (One) Time Per Week.       No current facility-administered medications for this visit.     Review of Systems   Constitutional: Negative for chills and fever.   HENT: Negative for congestion.    Respiratory: Negative for shortness of breath.    Cardiovascular: Negative for chest pain and leg swelling.   Gastrointestinal: Negative for constipation, diarrhea, nausea and vomiting.   Musculoskeletal: Positive for arthralgias.   Skin: Negative for wound.        calluses   Neurological: Positive for numbness.       OBJECTIVE     Vitals:    04/29/22 1058   BP: 112/70       PHYSICAL EXAM  GEN:   Accompanied by none.     Foot/Ankle Exam:       General:   Diabetic Foot Exam Performed    Appearance: appears stated age and healthy    Orientation: AAOx3    Affect: appropriate    Gait: unimpaired    Assistance: independent    Shoe Gear:  Casual shoes    VASCULAR      Right Foot Vascularity   Dorsalis pedis:  2+  Posterior tibial:  2+  Skin Temperature: warm    Edema Grading:  None  CFT:  3  Pedal Hair Growth:  Present  Varicosities: none       Left Foot Vascularity   Dorsalis pedis:  2+  Posterior tibial:  2+  Skin  Temperature: warm    Edema Grading:  None  CFT:  3  Pedal Hair Growth:  Present  Varicosities: none        NEUROLOGIC     Right Foot Neurologic   Light touch sensation:  Diminished  Vibratory sensation:  Diminished  Hot/Cold sensation: diminished    Protective Sensation using [place]-Narcisa Monofilament:  7     Left Foot Neurologic   Light touch sensation:  Diminished  Vibratory sensation:  Diminished  Hot/cold sensation: diminished    Protective Sensation using [place]-Narcisa Monofilament:  7     MUSCULOSKELETAL      Right Foot Musculoskeletal   Ecchymosis:  None  Tenderness: right foot callus and toenails    Arch:  Normal  Hammertoe:  Fifth toe and second toe (2nd toe is starting override hallux)  Hallux valgus: Yes (Medial bony eminence with abduction of hallux)    Hallux limitus: No       Left Foot Musculoskeletal   Ecchymosis:  None  Tenderness: left foot callus and toenails    Arch:  Normal  Hammertoe:  Fifth toe  Hallux valgus: No    Hallux limitus: Yes (s/p fusion)       MUSCLE STRENGTH     Right Foot Muscle Strength   Foot dorsiflexion:  5  Foot plantar flexion:  5  Foot inversion:  5  Foot eversion:  5     Left Foot Muscle Strength   Foot dorsiflexion:  5  Foot plantar flexion:  5  Foot inversion:  5  Foot eversion:  5     RANGE OF MOTION      Right Foot Range of Motion   Foot and ankle ROM within normal limits       Left Foot Range of Motion   Foot and ankle ROM within normal limits    1st MTP extension:  0  1st MTP flexion:  0     DERMATOLOGIC     Right Foot Dermatologic   Skin: corn (Plantar medial HIP)    Nails: onychomycosis, abnormally thick, subungual debris and dystrophic nails       Left Foot Dermatologic   Skin: corn (Plantar medial HIPJ and dorsal lateral 5th toe.)    Nails: onychomycosis, abnormally thick, subungual debris and dystrophic nails       Image:       RADIOLOGY/NUCLEAR:  No results found.    LABORATORY/CULTURE RESULTS:      PATHOLOGY RESULTS:       ASSESSMENT/PLAN     Diagnoses  and all orders for this visit:    1. Onychomycosis (Primary)    2. Foot callus    3. Foot pain, bilateral    4. Type 2 diabetes mellitus without complication, without long-term current use of insulin (HCC)    5. Hallux valgus, right    6. Arthritis of right foot    7. Hammer toes of both feet      Comprehensive lower extremity examination and evaluation was performed.  Discussed findings and treatment plan including risks, benefits, and treatment options with patient in detail. Patient agreed with treatment plan.  After verbal consent obtained, nail(s) x10 debrided of length and thickness with nail nipper without incidence  After verbal consent obtained, calluses x3 pared utilizing dermal curette and/or scalpel without incidence  Patient may maintain nails and calluses at home utilizing emery board or pumice stone between visits as needed  Reviewed at home diabetic foot care including daily foot checks   Continue diabetic monitoring and control under direction of PCP   Remain conservative with foot deformities   An After Visit Summary was printed and given to the patient at discharge, including (if requested) any available informative/educational handouts regarding diagnosis, treatment, or medications. All questions were answered to patient/family satisfaction. Should symptoms fail to improve or worsen they agree to call or return to clinic or to go to the Emergency Department. Discussed the importance of following up with any needed screening tests/labs/specialist appointments and any requested follow-up recommended by me today. Importance of maintaining follow-up discussed and patient accepts that missed appointments can delay diagnosis and potentially lead to worsening of conditions.  Return in about 3 months (around 7/29/2022)., or sooner if acute issues arise.    Lab Frequency Next Occurrence       This document has been electronically signed by TONEY Connor on April 29, 2022 11:53 CDT

## 2022-04-29 ENCOUNTER — OFFICE VISIT (OUTPATIENT)
Dept: PODIATRY | Facility: CLINIC | Age: 72
End: 2022-04-29

## 2022-04-29 VITALS
HEIGHT: 63 IN | BODY MASS INDEX: 33.84 KG/M2 | DIASTOLIC BLOOD PRESSURE: 70 MMHG | WEIGHT: 191 LBS | SYSTOLIC BLOOD PRESSURE: 112 MMHG

## 2022-04-29 DIAGNOSIS — M20.42 HAMMER TOES OF BOTH FEET: ICD-10-CM

## 2022-04-29 DIAGNOSIS — M20.11 HALLUX VALGUS, RIGHT: ICD-10-CM

## 2022-04-29 DIAGNOSIS — M19.071 ARTHRITIS OF RIGHT FOOT: ICD-10-CM

## 2022-04-29 DIAGNOSIS — M79.671 FOOT PAIN, BILATERAL: ICD-10-CM

## 2022-04-29 DIAGNOSIS — E11.9 TYPE 2 DIABETES MELLITUS WITHOUT COMPLICATION, WITHOUT LONG-TERM CURRENT USE OF INSULIN: ICD-10-CM

## 2022-04-29 DIAGNOSIS — B35.1 ONYCHOMYCOSIS: Primary | ICD-10-CM

## 2022-04-29 DIAGNOSIS — L84 FOOT CALLUS: ICD-10-CM

## 2022-04-29 DIAGNOSIS — M20.41 HAMMER TOES OF BOTH FEET: ICD-10-CM

## 2022-04-29 DIAGNOSIS — M79.672 FOOT PAIN, BILATERAL: ICD-10-CM

## 2022-04-29 PROCEDURE — 11056 PARNG/CUTG B9 HYPRKR LES 2-4: CPT | Performed by: NURSE PRACTITIONER

## 2022-04-29 PROCEDURE — 11721 DEBRIDE NAIL 6 OR MORE: CPT | Performed by: NURSE PRACTITIONER

## 2022-06-14 ENCOUNTER — HOSPITAL ENCOUNTER (OUTPATIENT)
Dept: GENERAL RADIOLOGY | Facility: HOSPITAL | Age: 72
Discharge: HOME | End: 2022-06-14
Admitting: NURSE PRACTITIONER

## 2022-06-14 PROCEDURE — 87591 N.GONORRHOEAE DNA AMP PROB: CPT | Performed by: NURSE PRACTITIONER

## 2022-06-14 PROCEDURE — 74018 RADEX ABDOMEN 1 VIEW: CPT

## 2022-06-14 PROCEDURE — 87491 CHLMYD TRACH DNA AMP PROBE: CPT | Performed by: NURSE PRACTITIONER

## 2022-06-14 PROCEDURE — 87661 TRICHOMONAS VAGINALIS AMPLIF: CPT | Performed by: NURSE PRACTITIONER

## 2022-07-27 NOTE — PROGRESS NOTES
[facility] - PODIATRY    Today's Date: 07/29/22    Patient Name: Camila Wallace  MRN: [id number]  CSN: [id number]  PCP: Sandra Wells PA  Referring Provider: No ref. provider found    SUBJECTIVE     Chief Complaint   Patient presents with   • Follow-up     Sandra Wells PA pcp06/09/20213 month f/u diabetic foot care- pt states feet doing ok, 2nd toe right foot is lifting and hurts under it at times- pt denies pain- pt presents with long thick nails, bunion inside right great toe   • Diabetes     Hasnt checked recently     HPI: Camila Wallace, a 72 y.o.female, comes to clinic as a(n) established patient presenting for diabetic foot exam, complaining of foot pain, complaining of painful toenails and calluses. Patient has h/o farida bullosa, thyroid disease, GERD, Graves Disease, HLD, HTN, FAUSTO, DM2. Patient is NIDDM and unsure of last BG level. Hasn't checked recently.  Notes occasional numbness/tingling in feet.  Denies open wounds or sores. States that her toenails are long, thick and discolored.  Calluses to each foot have returned.  Denies pain currently. Relates previous treatment(s) including care by podiatry. Denies any constitutional symptoms. No other pedal complaints at this time.    Past Medical History:   Diagnosis Date   • Allergic rhinitis    • Chronic sinusitis    • Farida bullosa    • Disease of thyroid gland    • Diverticulosis 2013   • GERD (gastroesophageal reflux disease)    • Graves disease    • HLD (hyperlipidemia)    • HTN (hypertension)    • Obstructive sleep apnea    • Type 2 diabetes mellitus (HCC)    • Vasomotor rhinitis      Past Surgical History:   Procedure Laterality Date   • CHOLECYSTECTOMY     • COLONOSCOPY  10/29/2013    diverticulosis   • COLONOSCOPY N/A 7/10/2018    Procedure: COLONOSCOPY WITH ANESTHESIA;  Surgeon: Donald Menchaca DO;  Location: [facility] ENDOSCOPY;  Service: Gastroenterology   • FOOT SURGERY     • TUBAL ABDOMINAL LIGATION     •  TYMPANIC MEMBRANE REPAIR       Family History   Problem Relation Age of Onset   • Diabetes Mother    • Heart disease Mother    • Hypertension Mother    • Hypertension Father    • Diabetes Brother    • Hypertension Brother    • Hyperlipidemia Brother    • Colon polyps Brother    • Hyperlipidemia Maternal Uncle    • Thyroid disease Maternal Uncle    • Cancer Maternal Grandmother    • Colon polyps Daughter      Social History     Socioeconomic History   • Marital status: Single   Tobacco Use   • Smoking status: Never Smoker   • Smokeless tobacco: Never Used   Vaping Use   • Vaping Use: Never used   Substance and Sexual Activity   • Alcohol use: Yes     Comment: occasionally   • Drug use: No   • Sexual activity: Yes     Allergies   Allergen Reactions   • Metformin And Related Swelling     Current Outpatient Medications   Medication Sig Dispense Refill   • Alcohol Swabs (B-D SINGLE USE SWABS REGULAR) pads      • alendronate (FOSAMAX) 70 MG tablet Take 70 mg by mouth Every 7 (Seven) Days.     • allopurinol (ZYLOPRIM) 100 MG tablet Take 100 mg by mouth Daily.     • atorvastatin (LIPITOR) 80 MG tablet Take 80 mg by mouth Daily.     • bisacodyl (Dulcolax) 5 MG EC tablet Take 2 each evening for 3 days 6 tablet 0   • Blood Glucose Calibration (True Metrix Level 1) Low solution      • Blood Glucose Monitoring Suppl (True Metrix Meter) w/Device kit      • cetirizine (zyrTEC) 10 MG tablet Take 1 tablet by mouth At Night As Needed for Allergies (sore throat and cough). 30 tablet 11   • cloNIDine (CATAPRES) 0.1 MG tablet Take 1 tablet by mouth Every 6 (Six) Hours As Needed for High Blood Pressure (for blood pressure >160/110). 30 tablet 0   • Glucose Blood (BLOOD GLUCOSE TEST) strip Test blood sugar daily and prn     • HYDROcodone-acetaminophen (NORCO)  MG per tablet TAKE 1 TABLET BY MOUTH THREE TIMES A DAY  EFFECTIVE DATE 3 21 19  0   • ipratropium (ATROVENT) 0.06 % nasal spray 2 sprays into the nostril(s) as directed by  provider 2 (Two) Times a Day. 15 mL 11   • Lancet Devices (TRUEdraw Lancing Device) misc      • lidocaine-prilocaine (EMLA) 2.5-2.5 % cream Apply  topically to the appropriate area as directed As Needed for Mild Pain .     • metoprolol succinate XL (TOPROL-XL) 100 MG 24 hr tablet Take 100 mg by mouth Daily.     • montelukast (SINGULAIR) 10 MG tablet Take 1 tablet by mouth Every Night. 30 tablet 11   • olmesartan-hydrochlorothiazide (BENICAR HCT) 40-12.5 MG per tablet      • pantoprazole (PROTONIX) 40 MG EC tablet Take 40 mg by mouth Daily.     • polyethylene glycol (MiraLax) 17 GM/SCOOP powder Take 2 capfuls (in 2 cups of juice or drink of choice)  3 times daily for 3 days, then 1 capful daily in 1 cup drink of choice 850 g 0   • SYNTHROID 100 MCG tablet Take 112 mcg by mouth Daily.     • topiramate (TOPAMAX) 100 MG tablet Take 100 mg by mouth 2 (Two) Times a Day.     • triamcinolone (KENALOG) 0.1 % cream Apply twice daily as needed for 2 weeks 80 g 0   • TRUEplus Lancets 33G misc      • vitamin D (ERGOCALCIFEROL) 98977 UNITS capsule capsule Take 50,000 Units by mouth 1 (One) Time Per Week.       No current facility-administered medications for this visit.     Review of Systems   Constitutional: Negative for chills and fever.   HENT: Negative for congestion.    Respiratory: Negative for shortness of breath.    Cardiovascular: Negative for chest pain and leg swelling.   Gastrointestinal: Negative for constipation, diarrhea, nausea and vomiting.   Musculoskeletal: Positive for arthralgias.   Skin: Negative for wound.        calluses   Neurological: Positive for numbness.       OBJECTIVE     Vitals:    07/29/22 1307   BP: 120/72   Pulse: 68   SpO2: 94%       PHYSICAL EXAM  GEN:   Accompanied by none.     Foot/Ankle Exam:       General:   Appearance: appears stated age and healthy    Orientation: AAOx3    Affect: appropriate    Gait: unimpaired    Assistance: independent    Shoe Gear:  Casual shoes    VASCULAR       Right Foot Vascularity   Dorsalis pedis:  2+  Posterior tibial:  2+  Skin Temperature: warm    Edema Grading:  None  CFT:  3  Pedal Hair Growth:  Present  Varicosities: none       Left Foot Vascularity   Dorsalis pedis:  2+  Posterior tibial:  2+  Skin Temperature: warm    Edema Grading:  None  CFT:  3  Pedal Hair Growth:  Present  Varicosities: none        NEUROLOGIC     Right Foot Neurologic   Light touch sensation:  Diminished  Vibratory sensation:  Diminished  Hot/Cold sensation: diminished    Protective Sensation using [place] Monofilament:  7     Left Foot Neurologic   Light touch sensation:  Diminished  Vibratory sensation:  Diminished  Hot/cold sensation: diminished    Protective Sensation using [place]arcisa Monofilament:  7     MUSCULOSKELETAL      Right Foot Musculoskeletal   Ecchymosis:  None  Tenderness: right foot callus and toenails    Arch:  Normal  Hammertoe:  Fifth toe and second toe (2nd toe is starting override hallux)  Hallux valgus: Yes (Medial bony eminence with abduction of hallux)    Hallux limitus: No       Left Foot Musculoskeletal   Ecchymosis:  None  Tenderness: left foot callus and toenails    Arch:  Normal  Hammertoe:  Fifth toe  Hallux valgus: No    Hallux limitus: Yes (s/p fusion)       MUSCLE STRENGTH     Right Foot Muscle Strength   Foot dorsiflexion:  5  Foot plantar flexion:  5  Foot inversion:  5  Foot eversion:  5     Left Foot Muscle Strength   Foot dorsiflexion:  5  Foot plantar flexion:  5  Foot inversion:  5  Foot eversion:  5     RANGE OF MOTION      Right Foot Range of Motion   Foot and ankle ROM within normal limits       Left Foot Range of Motion   Foot and ankle ROM within normal limits    1st MTP extension:  0  1st MTP flexion:  0     DERMATOLOGIC     Right Foot Dermatologic   Skin: corn (Plantar medial HIP)    Nails: onychomycosis, abnormally thick, subungual debris and dystrophic nails       Left Foot Dermatologic   Skin: corn (Plantar medial HIPJ and  dorsal lateral 5th toe.)    Nails: onychomycosis, abnormally thick, subungual debris and dystrophic nails       Image:       RADIOLOGY/NUCLEAR:  No results found.    LABORATORY/CULTURE RESULTS:      PATHOLOGY RESULTS:       ASSESSMENT/PLAN     Diagnoses and all orders for this visit:    1. Onychomycosis (Primary)    2. Foot callus    3. Foot pain, bilateral    4. Type 2 diabetes mellitus without complication, without long-term current use of insulin (HCC)    5. Hallux valgus, right    6. Arthritis of right foot    7. Hammer toes of both feet      Comprehensive lower extremity examination and evaluation was performed.  Discussed findings and treatment plan including risks, benefits, and treatment options with patient in detail. Patient agreed with treatment plan.  After verbal consent obtained, nail(s) x10 debrided of length and thickness with nail nipper without incidence  After verbal consent obtained, calluses x3 pared utilizing dermal curette and/or scalpel without incidence  Patient may maintain nails and calluses at home utilizing emery board or pumice stone between visits as needed  Reviewed at home diabetic foot care including daily foot checks   Continue diabetic monitoring and control under direction of PCP   Remain conservative with foot deformities   An After Visit Summary was printed and given to the patient at discharge, including (if requested) any available informative/educational handouts regarding diagnosis, treatment, or medications. All questions were answered to patient/family satisfaction. Should symptoms fail to improve or worsen they agree to call or return to clinic or to go to the Emergency Department. Discussed the importance of following up with any needed screening tests/labs/specialist appointments and any requested follow-up recommended by me today. Importance of maintaining follow-up discussed and patient accepts that missed appointments can delay diagnosis and potentially lead to  worsening of conditions.  Return in about 3 months (around 10/29/2022)., or sooner if acute issues arise.    Lab Frequency Next Occurrence       This document has been electronically signed by TONEY Connor on July 29, 2022 13:36 CDT

## 2022-07-29 ENCOUNTER — OFFICE VISIT (OUTPATIENT)
Dept: PODIATRY | Facility: CLINIC | Age: 72
End: 2022-07-29

## 2022-07-29 VITALS
WEIGHT: 196 LBS | BODY MASS INDEX: 36.07 KG/M2 | OXYGEN SATURATION: 94 % | SYSTOLIC BLOOD PRESSURE: 120 MMHG | HEIGHT: 62 IN | HEART RATE: 68 BPM | DIASTOLIC BLOOD PRESSURE: 72 MMHG

## 2022-07-29 DIAGNOSIS — L84 FOOT CALLUS: ICD-10-CM

## 2022-07-29 DIAGNOSIS — M79.671 FOOT PAIN, BILATERAL: ICD-10-CM

## 2022-07-29 DIAGNOSIS — M19.071 ARTHRITIS OF RIGHT FOOT: ICD-10-CM

## 2022-07-29 DIAGNOSIS — B35.1 ONYCHOMYCOSIS: Primary | ICD-10-CM

## 2022-07-29 DIAGNOSIS — E11.9 TYPE 2 DIABETES MELLITUS WITHOUT COMPLICATION, WITHOUT LONG-TERM CURRENT USE OF INSULIN: ICD-10-CM

## 2022-07-29 DIAGNOSIS — M20.11 HALLUX VALGUS, RIGHT: ICD-10-CM

## 2022-07-29 DIAGNOSIS — M79.672 FOOT PAIN, BILATERAL: ICD-10-CM

## 2022-07-29 DIAGNOSIS — M20.41 HAMMER TOES OF BOTH FEET: ICD-10-CM

## 2022-07-29 DIAGNOSIS — M20.42 HAMMER TOES OF BOTH FEET: ICD-10-CM

## 2022-07-29 PROCEDURE — 11721 DEBRIDE NAIL 6 OR MORE: CPT | Performed by: NURSE PRACTITIONER

## 2022-07-29 PROCEDURE — 11056 PARNG/CUTG B9 HYPRKR LES 2-4: CPT | Performed by: NURSE PRACTITIONER

## 2022-08-03 ENCOUNTER — OFFICE VISIT (OUTPATIENT)
Dept: OTOLARYNGOLOGY | Facility: CLINIC | Age: 72
End: 2022-08-03

## 2022-08-03 VITALS
DIASTOLIC BLOOD PRESSURE: 76 MMHG | TEMPERATURE: 97.5 F | SYSTOLIC BLOOD PRESSURE: 124 MMHG | BODY MASS INDEX: 35.33 KG/M2 | HEIGHT: 62 IN | WEIGHT: 192 LBS | HEART RATE: 86 BPM

## 2022-08-03 DIAGNOSIS — J30.1 ALLERGIC RHINITIS DUE TO POLLEN, UNSPECIFIED SEASONALITY: ICD-10-CM

## 2022-08-03 DIAGNOSIS — G47.33 OSA (OBSTRUCTIVE SLEEP APNEA): ICD-10-CM

## 2022-08-03 DIAGNOSIS — J32.9 CHRONIC SINUSITIS, UNSPECIFIED LOCATION: ICD-10-CM

## 2022-08-03 DIAGNOSIS — R09.82 PND (POST-NASAL DRIP): Primary | ICD-10-CM

## 2022-08-03 DIAGNOSIS — H61.22 IMPACTED CERUMEN OF LEFT EAR: ICD-10-CM

## 2022-08-03 PROCEDURE — 99213 OFFICE O/P EST LOW 20 MIN: CPT | Performed by: NURSE PRACTITIONER

## 2022-08-03 PROCEDURE — 69210 REMOVE IMPACTED EAR WAX UNI: CPT | Performed by: NURSE PRACTITIONER

## 2022-08-03 RX ORDER — AZELASTINE 1 MG/ML
2 SPRAY, METERED NASAL 2 TIMES DAILY
Qty: 30 ML | Refills: 11 | Status: SHIPPED | OUTPATIENT
Start: 2022-08-03 | End: 2023-03-27

## 2022-08-03 RX ORDER — IPRATROPIUM BROMIDE 42 UG/1
2 SPRAY, METERED NASAL 2 TIMES DAILY
Qty: 15 ML | Refills: 11 | Status: SHIPPED | OUTPATIENT
Start: 2022-08-03 | End: 2023-01-09

## 2022-08-03 NOTE — PATIENT INSTRUCTIONS
For the best response, use your nasal sprays every day without skipping doses. It may take several weeks before the full effect is acheived.     Pt aware and stated understanding. SM

## 2022-08-03 NOTE — PROGRESS NOTES
YOB: 1950  Location: [facility]  Location Address: [address] [place] [address], [facility], [address] [place] [address]  Location Phone: [phone]    Chief Complaint   Patient presents with   • Allergic Rhinitis     Left ear pain        History of Present Illness  Camila Wallace is a 72 y.o. female.  Camila Wallace is here for follow up of ENT complaints. The patient has had problems with nasal congestion, postnasal drainge and allergy symptoms  The symptoms are not localized to a particular location. The patient has had mild and improving symptoms. The symptoms have been present for the last several years The symptoms are aggravated by  allergy and weather change. The symptoms are improved by nasal sprays.  Patient does complain of some intermittent left ear pain that started a few days ago        Past Medical History:   Diagnosis Date   • Allergic rhinitis    • Chronic sinusitis    • Sommer bullosa    • Disease of thyroid gland    • Diverticulosis    • GERD (gastroesophageal reflux disease)    • Graves disease    • HLD (hyperlipidemia)    • HTN (hypertension)    • Obstructive sleep apnea    • Type 2 diabetes mellitus (HCC)    • Vasomotor rhinitis        Past Surgical History:   Procedure Laterality Date   • CHOLECYSTECTOMY     • COLONOSCOPY  10/29/2013    diverticulosis   • COLONOSCOPY N/A 7/10/2018    Procedure: COLONOSCOPY WITH ANESTHESIA;  Surgeon: Donald Menchaca DO;  Location: [facility] ENDOSCOPY;  Service: Gastroenterology   • FOOT SURGERY     • TUBAL ABDOMINAL LIGATION     • TYMPANIC MEMBRANE REPAIR         Outpatient Medications Marked as Taking for the 8/3/22 encounter (Office Visit) with Sweta Hebert APRN   Medication Sig Dispense Refill   • Alcohol Swabs (B-D SINGLE USE SWABS REGULAR) pads      • alendronate (FOSAMAX) 70 MG tablet Take 70 mg by mouth Every 7 (Seven) Days.     • allopurinol (ZYLOPRIM) 100 MG tablet Take 100 mg by mouth Daily.     • atorvastatin (LIPITOR) 80 MG tablet Take 80 mg  by mouth Daily.     • bisacodyl (Dulcolax) 5 MG EC tablet Take 2 each evening for 3 days 6 tablet 0   • Blood Glucose Calibration (True Metrix Level 1) Low solution      • Blood Glucose Monitoring Suppl (True Metrix Meter) w/Device kit      • cetirizine (zyrTEC) 10 MG tablet Take 1 tablet by mouth At Night As Needed for Allergies (sore throat and cough). 30 tablet 11   • cloNIDine (CATAPRES) 0.1 MG tablet Take 1 tablet by mouth Every 6 (Six) Hours As Needed for High Blood Pressure (for blood pressure >160/110). 30 tablet 0   • Glucose Blood (BLOOD GLUCOSE TEST) strip Test blood sugar daily and prn     • HYDROcodone-acetaminophen (NORCO)  MG per tablet TAKE 1 TABLET BY MOUTH THREE TIMES A DAY  EFFECTIVE DATE 3 21 19  0   • ipratropium (ATROVENT) 0.06 % nasal spray 2 sprays into the nostril(s) as directed by provider 2 (Two) Times a Day. 15 mL 11   • Lancet Devices (TRUEdraw Lancing Device) misc      • lidocaine-prilocaine (EMLA) 2.5-2.5 % cream Apply  topically to the appropriate area as directed As Needed for Mild Pain .     • metoprolol succinate XL (TOPROL-XL) 100 MG 24 hr tablet Take 100 mg by mouth Daily.     • montelukast (SINGULAIR) 10 MG tablet Take 1 tablet by mouth Every Night. 30 tablet 11   • olmesartan-hydrochlorothiazide (BENICAR HCT) 40-12.5 MG per tablet      • pantoprazole (PROTONIX) 40 MG EC tablet Take 40 mg by mouth Daily.     • polyethylene glycol (MiraLax) 17 GM/SCOOP powder Take 2 capfuls (in 2 cups of juice or drink of choice)  3 times daily for 3 days, then 1 capful daily in 1 cup drink of choice 850 g 0   • SYNTHROID 100 MCG tablet Take 112 mcg by mouth Daily.     • topiramate (TOPAMAX) 100 MG tablet Take 100 mg by mouth 2 (Two) Times a Day.     • triamcinolone (KENALOG) 0.1 % cream Apply twice daily as needed for 2 weeks 80 g 0   • TRUEplus Lancets 33G misc      • vitamin D (ERGOCALCIFEROL) 47789 UNITS capsule capsule Take 50,000 Units by mouth 1 (One) Time Per Week.     •  [DISCONTINUED] ipratropium (ATROVENT) 0.06 % nasal spray 2 sprays into the nostril(s) as directed by provider 2 (Two) Times a Day. 15 mL 11       Metformin and related    Family History   Problem Relation Age of Onset   • Diabetes Mother    • Heart disease Mother    • Hypertension Mother    • Hypertension Father    • Diabetes Brother    • Hypertension Brother    • Hyperlipidemia Brother    • Colon polyps Brother    • Hyperlipidemia Maternal Uncle    • Thyroid disease Maternal Uncle    • Cancer Maternal Grandmother    • Colon polyps Daughter        Social History     Socioeconomic History   • Marital status: Single   Tobacco Use   • Smoking status: Never Smoker   • Smokeless tobacco: Never Used   Vaping Use   • Vaping Use: Never used   Substance and Sexual Activity   • Alcohol use: Yes     Comment: occasionally   • Drug use: No   • Sexual activity: Yes       Review of Systems   Constitutional: Negative.    HENT: Positive for ear pain. Negative for congestion.    Respiratory: Negative.    Cardiovascular: Negative.    Gastrointestinal: Negative.    Genitourinary: Negative.    Musculoskeletal: Negative.        Vitals:    08/03/22 1324   BP: 124/76   Pulse: 86   Temp: 97.5 °F (36.4 °C)       Body mass index is 35.12 kg/m².    Objective       Physical Exam  Vitals reviewed.   Constitutional:       Appearance: She is obese.   HENT:      Head: Normocephalic.      Right Ear: Hearing, tympanic membrane, ear canal and external ear normal.      Left Ear: External ear normal. There is impacted cerumen. Tympanic membrane is retracted.      Nose: Nose normal.      Mouth/Throat:      Lips: Pink.      Mouth: Mucous membranes are moist.      Pharynx: Uvula midline.   Musculoskeletal:      Cervical back: Full passive range of motion without pain and normal range of motion.   Neurological:      Mental Status: She is alert.       Ear Cerumen Removal    Date/Time: 8/3/2022 1:41 PM  Performed by: Sweta Hebert APRN  Authorized by:  Sweta Hebert APRN   Consent: Verbal consent obtained.  Consent given by: patient  Patient understanding: patient states understanding of the procedure being performed  Patient identity confirmed: verbally with patient    Anesthesia:  Local Anesthetic: none  Location details: left ear  Patient tolerance: patient tolerated the procedure well with no immediate complications  Procedure type: instrumentation, curette       Assessment & Plan      Diagnoses and all orders for this visit:    1. PND (post-nasal drip) (Primary)  -     ipratropium (ATROVENT) 0.06 % nasal spray; 2 sprays into the nostril(s) as directed by provider 2 (Two) Times a Day.  Dispense: 15 mL; Refill: 11    2. FAUSTO (obstructive sleep apnea)    3. Allergic rhinitis due to pollen, unspecified seasonality  -     ipratropium (ATROVENT) 0.06 % nasal spray; 2 sprays into the nostril(s) as directed by provider 2 (Two) Times a Day.  Dispense: 15 mL; Refill: 11    4. Chronic sinusitis, unspecified location  -     ipratropium (ATROVENT) 0.06 % nasal spray; 2 sprays into the nostril(s) as directed by provider 2 (Two) Times a Day.  Dispense: 15 mL; Refill: 11    5. Impacted cerumen of left ear    Other orders  -     azelastine (ASTELIN) 0.1 % nasal spray; 2 sprays into the nostril(s) as directed by provider 2 (Two) Times a Day. Use in each nostril as directed  Dispense: 30 mL; Refill: 11  -     Ear Cerumen Removal    continue nasal sprays as directed   Call for new/worsening problems     * Surgery not found *  Orders Placed This Encounter   Procedures   • Ear Cerumen Removal     This order was created via procedure documentation     Order Specific Question:   Release to patient     Answer:   Immediate     Return in about 1 year (around 8/3/2023) for Recheck.       Patient Instructions   For the best response, use your nasal sprays every day without skipping doses. It may take several weeks before the full effect is acheived.

## 2022-10-21 PROCEDURE — 87591 N.GONORRHOEAE DNA AMP PROB: CPT | Performed by: NURSE PRACTITIONER

## 2022-10-21 PROCEDURE — 87661 TRICHOMONAS VAGINALIS AMPLIF: CPT | Performed by: NURSE PRACTITIONER

## 2022-10-21 PROCEDURE — 87491 CHLMYD TRACH DNA AMP PROBE: CPT | Performed by: NURSE PRACTITIONER

## 2022-10-21 PROCEDURE — 87086 URINE CULTURE/COLONY COUNT: CPT | Performed by: NURSE PRACTITIONER

## 2022-11-03 NOTE — PROGRESS NOTES
[facility] - PODIATRY    Today's Date: 11/08/22    Patient Name: Camila Wallace  MRN: [id number]  CSN: [id number]  PCP: Sandra Wells PA  Referring Provider: No ref. provider found    SUBJECTIVE     Chief Complaint   Patient presents with   • Follow-up     Sandra Wells 07/13/2022-3 MO FU DIABETIC FOOT CARE- pt states feet doing ok other than calluses need some work- pt denies pain- pt presents with long thick nails, calluses inside scot great toes, outside 5th toes   • Diabetes     Hasnt checked     HPI: Camila Wallace, a 72 y.o.female, comes to clinic as a(n) established patient presenting for diabetic foot exam, complaining of foot pain, complaining of ankle pain, complaining of painful toenails and calluses. Patient has h/o farida bullosa, thyroid disease, GERD, Graves Disease, HLD, HTN, FAUSTO, DM2. Patient is NIDDM and unsure of last BG level. Hasn't checked recently.  Notes occasional numbness/tingling in feet. States that she recently had an inversion injury of the right foot/ankle in her garage and has been having some pain in the ankle and heel. States that she had been wearing CAM for left foot as given by [facility] but has not attempted treatment for right foot/ankle. Denies open wounds or sores. States that her toenails are long, thick and discolored.  Calluses to each foot have returned.  Denies pain currently. Relates previous treatment(s) including care by podiatry. Denies any constitutional symptoms. No other pedal complaints at this time.    Past Medical History:   Diagnosis Date   • Allergic rhinitis    • Chronic sinusitis    • Farida bullosa    • Disease of thyroid gland    • Diverticulosis 2013   • GERD (gastroesophageal reflux disease)    • Graves disease    • HLD (hyperlipidemia)    • HTN (hypertension)    • Obstructive sleep apnea    • Type 2 diabetes mellitus (HCC)    • Vasomotor rhinitis      Past Surgical History:   Procedure Laterality Date   •  CHOLECYSTECTOMY     • COLONOSCOPY  10/29/2013    diverticulosis   • COLONOSCOPY N/A 7/10/2018    Procedure: COLONOSCOPY WITH ANESTHESIA;  Surgeon: Donald Menchaca DO;  Location: [facility] ENDOSCOPY;  Service: Gastroenterology   • FOOT SURGERY     • TUBAL ABDOMINAL LIGATION     • TYMPANIC MEMBRANE REPAIR       Family History   Problem Relation Age of Onset   • Diabetes Mother    • Heart disease Mother    • Hypertension Mother    • Hypertension Father    • Diabetes Brother    • Hypertension Brother    • Hyperlipidemia Brother    • Colon polyps Brother    • Hyperlipidemia Maternal Uncle    • Thyroid disease Maternal Uncle    • Cancer Maternal Grandmother    • Colon polyps Daughter      Social History     Socioeconomic History   • Marital status: Single   Tobacco Use   • Smoking status: Never   • Smokeless tobacco: Never   Vaping Use   • Vaping Use: Never used   Substance and Sexual Activity   • Alcohol use: Yes     Comment: occasionally   • Drug use: No   • Sexual activity: Yes     Allergies   Allergen Reactions   • Metformin And Related Swelling     Current Outpatient Medications   Medication Sig Dispense Refill   • Alcohol Swabs (B-D SINGLE USE SWABS REGULAR) pads      • alendronate (FOSAMAX) 70 MG tablet Take 70 mg by mouth Every 7 (Seven) Days.     • allopurinol (ZYLOPRIM) 100 MG tablet Take 100 mg by mouth Daily.     • atorvastatin (LIPITOR) 80 MG tablet Take 80 mg by mouth Daily.     • azelastine (ASTELIN) 0.1 % nasal spray 2 sprays into the nostril(s) as directed by provider 2 (Two) Times a Day. Use in each nostril as directed 30 mL 11   • bisacodyl (Dulcolax) 5 MG EC tablet Take 2 each evening for 3 days 6 tablet 0   • Blood Glucose Calibration (True Metrix Level 1) Low solution      • Blood Glucose Monitoring Suppl (True Metrix Meter) w/Device kit      • cetirizine (zyrTEC) 10 MG tablet Take 1 tablet by mouth At Night As Needed for Allergies (sore throat and cough). 30 tablet 11   • cloNIDine (CATAPRES) 0.1 MG  tablet Take 1 tablet by mouth Every 6 (Six) Hours As Needed for High Blood Pressure (for blood pressure >160/110). 30 tablet 0   • Glucose Blood (BLOOD GLUCOSE TEST) strip Test blood sugar daily and prn     • HYDROcodone-acetaminophen (NORCO)  MG per tablet TAKE 1 TABLET BY MOUTH THREE TIMES A DAY  EFFECTIVE DATE 3 21 19  0   • ipratropium (ATROVENT) 0.06 % nasal spray 2 sprays into the nostril(s) as directed by provider 2 (Two) Times a Day. 15 mL 11   • Lancet Devices (TRUEdraw Lancing Device) misc      • lidocaine-prilocaine (EMLA) 2.5-2.5 % cream Apply  topically to the appropriate area as directed As Needed for Mild Pain .     • metoprolol succinate XL (TOPROL-XL) 100 MG 24 hr tablet Take 100 mg by mouth Daily.     • montelukast (SINGULAIR) 10 MG tablet Take 1 tablet by mouth Every Night. 30 tablet 11   • olmesartan-hydrochlorothiazide (BENICAR HCT) 40-12.5 MG per tablet      • pantoprazole (PROTONIX) 40 MG EC tablet Take 40 mg by mouth Daily.     • polyethylene glycol (MiraLax) 17 GM/SCOOP powder Take 2 capfuls (in 2 cups of juice or drink of choice)  3 times daily for 3 days, then 1 capful daily in 1 cup drink of choice 850 g 0   • SYNTHROID 100 MCG tablet Take 112 mcg by mouth Daily.     • topiramate (TOPAMAX) 100 MG tablet Take 100 mg by mouth 2 (Two) Times a Day.     • triamcinolone (KENALOG) 0.1 % cream Apply twice daily as needed for 2 weeks 80 g 0   • TRUEplus Lancets 33G misc      • vitamin D (ERGOCALCIFEROL) 19165 UNITS capsule capsule Take 50,000 Units by mouth 1 (One) Time Per Week.       No current facility-administered medications for this visit.     Review of Systems   Constitutional: Negative for chills and fever.   HENT: Negative for congestion.    Respiratory: Negative for shortness of breath.    Cardiovascular: Positive for leg swelling. Negative for chest pain.   Gastrointestinal: Negative for constipation, diarrhea, nausea and vomiting.   Musculoskeletal: Positive for arthralgias and  joint swelling.   Skin: Negative for wound.        calluses   Neurological: Positive for numbness.       OBJECTIVE     Vitals:    22 1110   BP: 118/68   Pulse: 80   SpO2: 98%       PHYSICAL EXAM  GEN:   Accompanied by none.     Foot/Ankle Exam:       General:   Appearance: appears stated age and healthy    Orientation: AAOx3    Affect: appropriate    Gait: antalgic    Assistance: independent    Shoe Gear:  Casual shoes    VASCULAR      Right Foot Vascularity   Dorsalis pedis:  2+  Posterior tibial:  2+  Skin Temperature: warm    Edema Gradin+ and pitting (focal to lateral malleolus)  CFT:  3  Pedal Hair Growth:  Present  Varicosities: none       Left Foot Vascularity   Dorsalis pedis:  2+  Posterior tibial:  2+  Skin Temperature: warm    Edema Grading:  None  CFT:  3  Pedal Hair Growth:  Present  Varicosities: none        NEUROLOGIC     Right Foot Neurologic   Light touch sensation:  Diminished  Vibratory sensation:  Diminished  Hot/Cold sensation: diminished    Protective Sensation using Cortez-Narcisa Monofilament:  7     Left Foot Neurologic   Light touch sensation:  Diminished  Vibratory sensation:  Diminished  Hot/cold sensation: diminished    Protective Sensation using Cortez-Narcisa Monofilament:  7     MUSCULOSKELETAL      Right Foot Musculoskeletal   Ecchymosis:  None  Tenderness: right foot callus, toenails, lateral malleolus, peroneal tendon and posterior tibial tendon    Tenderness comment:  ATFL  Arch:  Normal  Hammertoe:  Fifth toe and second toe (2nd toe is starting override hallux)  Hallux valgus: Yes (Medial bony eminence with abduction of hallux)    Hallux limitus: No       Left Foot Musculoskeletal   Ecchymosis:  None  Tenderness: left foot callus and toenails    Arch:  Normal  Hammertoe:  Fifth toe  Hallux valgus: No    Hallux limitus: Yes (s/p fusion)       MUSCLE STRENGTH     Right Foot Muscle Strength   Foot dorsiflexion:  5  Foot plantar flexion:  5  Foot inversion:  5  Foot  eversion:  5     Left Foot Muscle Strength   Foot dorsiflexion:  5  Foot plantar flexion:  5  Foot inversion:  5  Foot eversion:  5     RANGE OF MOTION      Right Foot Range of Motion   Foot and ankle ROM within normal limits    foot eversion pain       Left Foot Range of Motion   Foot and ankle ROM within normal limits    1st MTP extension:  0  1st MTP flexion:  0     DERMATOLOGIC     Right Foot Dermatologic   Skin: corn (Plantar medial HIP)    Nails: onychomycosis, abnormally thick, subungual debris and dystrophic nails       Left Foot Dermatologic   Skin: corn (Plantar medial HIPJ and dorsal lateral 5th toe.)    Nails: onychomycosis, abnormally thick, subungual debris and dystrophic nails       Image:       RADIOLOGY/NUCLEAR:  No results found.    LABORATORY/CULTURE RESULTS:      PATHOLOGY RESULTS:       ASSESSMENT/PLAN     Diagnoses and all orders for this visit:    1. Sprain of anterior talofibular ligament of right ankle, initial encounter (Primary)    2. Onychomycosis    3. Foot callus    4. Foot pain, bilateral    5. Type 2 diabetes mellitus without complication, without long-term current use of insulin (HCC)    6. Hallux valgus, right    7. Arthritis of right foot    8. Hammer toes of both feet      Comprehensive lower extremity examination and evaluation was performed.  Discussed findings and treatment plan including risks, benefits, and treatment options with patient in detail. Patient agreed with treatment plan.  After verbal consent obtained, nail(s) x10 debrided of length and thickness with nail nipper without incidence  After verbal consent obtained, calluses x3 pared utilizing dermal curette and/or scalpel without incidence  Patient may maintain nails and calluses at home utilizing emery board or pumice stone between visits as needed  Reviewed at home diabetic foot care including daily foot checks   Continue diabetic monitoring and control under direction of PCP   Remain conservative with foot  deformities   Ankle pain consistent with mild ankle sprain of ATFL given location of pain and description of injury. Recommend patient utilize CAM boot that she has at home for a period of at least 2 weeks for RLE to allow for immobilization.   An After Visit Summary was printed and given to the patient at discharge, including (if requested) any available informative/educational handouts regarding diagnosis, treatment, or medications. All questions were answered to patient/family satisfaction. Should symptoms fail to improve or worsen they agree to call or return to clinic or to go to the Emergency Department. Discussed the importance of following up with any needed screening tests/labs/specialist appointments and any requested follow-up recommended by me today. Importance of maintaining follow-up discussed and patient accepts that missed appointments can delay diagnosis and potentially lead to worsening of conditions.  Return in about 3 months (around 2/7/2023)., or sooner if acute issues arise.    Lab Frequency Next Occurrence       This document has been electronically signed by TONEY Connor on November 8, 2022 07:52 CST

## 2022-11-07 ENCOUNTER — OFFICE VISIT (OUTPATIENT)
Dept: PODIATRY | Facility: CLINIC | Age: 72
End: 2022-11-07

## 2022-11-07 VITALS
BODY MASS INDEX: 36.07 KG/M2 | HEART RATE: 80 BPM | DIASTOLIC BLOOD PRESSURE: 68 MMHG | OXYGEN SATURATION: 98 % | SYSTOLIC BLOOD PRESSURE: 118 MMHG | WEIGHT: 196 LBS | HEIGHT: 62 IN

## 2022-11-07 DIAGNOSIS — B35.1 ONYCHOMYCOSIS: ICD-10-CM

## 2022-11-07 DIAGNOSIS — M20.42 HAMMER TOES OF BOTH FEET: ICD-10-CM

## 2022-11-07 DIAGNOSIS — M20.11 HALLUX VALGUS, RIGHT: ICD-10-CM

## 2022-11-07 DIAGNOSIS — M19.071 ARTHRITIS OF RIGHT FOOT: ICD-10-CM

## 2022-11-07 DIAGNOSIS — M79.671 FOOT PAIN, BILATERAL: ICD-10-CM

## 2022-11-07 DIAGNOSIS — L84 FOOT CALLUS: ICD-10-CM

## 2022-11-07 DIAGNOSIS — M20.41 HAMMER TOES OF BOTH FEET: ICD-10-CM

## 2022-11-07 DIAGNOSIS — E11.9 TYPE 2 DIABETES MELLITUS WITHOUT COMPLICATION, WITHOUT LONG-TERM CURRENT USE OF INSULIN: ICD-10-CM

## 2022-11-07 DIAGNOSIS — S93.491A SPRAIN OF ANTERIOR TALOFIBULAR LIGAMENT OF RIGHT ANKLE, INITIAL ENCOUNTER: Primary | ICD-10-CM

## 2022-11-07 DIAGNOSIS — M79.672 FOOT PAIN, BILATERAL: ICD-10-CM

## 2022-11-07 PROCEDURE — 11721 DEBRIDE NAIL 6 OR MORE: CPT | Performed by: NURSE PRACTITIONER

## 2022-11-07 PROCEDURE — 11056 PARNG/CUTG B9 HYPRKR LES 2-4: CPT | Performed by: NURSE PRACTITIONER

## 2022-11-07 PROCEDURE — 99213 OFFICE O/P EST LOW 20 MIN: CPT | Performed by: NURSE PRACTITIONER

## 2022-12-29 ENCOUNTER — TELEPHONE (OUTPATIENT)
Dept: OTOLARYNGOLOGY | Facility: CLINIC | Age: 72
End: 2022-12-29

## 2022-12-29 NOTE — TELEPHONE ENCOUNTER
Patient called requesting allergy medication.  Patient just finished an antibiotic that was given to her at Urgent Care.  Advised patient to get some Zyrtec or Claritin  and not to take it everyday because it will dry pt out too much.  Patient proceeded to tell me that Claritin didn't help and she would try Zyrtec and hung up on me.

## 2023-01-09 DIAGNOSIS — J32.9 CHRONIC SINUSITIS, UNSPECIFIED LOCATION: ICD-10-CM

## 2023-01-09 DIAGNOSIS — R09.82 PND (POST-NASAL DRIP): ICD-10-CM

## 2023-01-09 DIAGNOSIS — J30.1 ALLERGIC RHINITIS DUE TO POLLEN, UNSPECIFIED SEASONALITY: ICD-10-CM

## 2023-01-09 RX ORDER — IPRATROPIUM BROMIDE 42 UG/1
SPRAY, METERED NASAL
Qty: 75 ML | Refills: 2 | Status: SHIPPED | OUTPATIENT
Start: 2023-01-09

## 2023-01-25 PROCEDURE — 87636 SARSCOV2 & INF A&B AMP PRB: CPT | Performed by: NURSE PRACTITIONER

## 2023-02-03 ENCOUNTER — TELEPHONE (OUTPATIENT)
Dept: PODIATRY | Facility: CLINIC | Age: 73
End: 2023-02-03
Payer: MEDICARE

## 2023-02-06 ENCOUNTER — OFFICE VISIT (OUTPATIENT)
Dept: PODIATRY | Facility: CLINIC | Age: 73
End: 2023-02-06
Payer: MEDICARE

## 2023-02-06 VITALS
HEART RATE: 64 BPM | SYSTOLIC BLOOD PRESSURE: 138 MMHG | HEIGHT: 63 IN | OXYGEN SATURATION: 97 % | DIASTOLIC BLOOD PRESSURE: 88 MMHG | BODY MASS INDEX: 35.44 KG/M2 | WEIGHT: 200 LBS

## 2023-02-06 DIAGNOSIS — M79.671 FOOT PAIN, BILATERAL: ICD-10-CM

## 2023-02-06 DIAGNOSIS — L84 FOOT CALLUS: ICD-10-CM

## 2023-02-06 DIAGNOSIS — E11.9 TYPE 2 DIABETES MELLITUS WITHOUT COMPLICATION, WITHOUT LONG-TERM CURRENT USE OF INSULIN: ICD-10-CM

## 2023-02-06 DIAGNOSIS — M20.11 HALLUX VALGUS, RIGHT: ICD-10-CM

## 2023-02-06 DIAGNOSIS — M20.41 HAMMER TOES OF BOTH FEET: ICD-10-CM

## 2023-02-06 DIAGNOSIS — M20.42 HAMMER TOES OF BOTH FEET: ICD-10-CM

## 2023-02-06 DIAGNOSIS — E11.9 ENCOUNTER FOR DIABETIC FOOT EXAM: ICD-10-CM

## 2023-02-06 DIAGNOSIS — M79.672 FOOT PAIN, BILATERAL: ICD-10-CM

## 2023-02-06 DIAGNOSIS — B35.1 ONYCHOMYCOSIS: Primary | ICD-10-CM

## 2023-02-06 PROCEDURE — 11056 PARNG/CUTG B9 HYPRKR LES 2-4: CPT | Performed by: NURSE PRACTITIONER

## 2023-02-06 PROCEDURE — 99213 OFFICE O/P EST LOW 20 MIN: CPT | Performed by: NURSE PRACTITIONER

## 2023-02-06 PROCEDURE — 11721 DEBRIDE NAIL 6 OR MORE: CPT | Performed by: NURSE PRACTITIONER

## 2023-02-09 ENCOUNTER — HOSPITAL ENCOUNTER (EMERGENCY)
Facility: HOSPITAL | Age: 73
Discharge: HOME | End: 2023-02-09
Attending: FAMILY MEDICINE | Admitting: FAMILY MEDICINE
Payer: MEDICARE

## 2023-02-09 ENCOUNTER — APPOINTMENT (OUTPATIENT)
Dept: CT IMAGING | Facility: HOSPITAL | Age: 73
End: 2023-02-09
Payer: MEDICARE

## 2023-02-09 VITALS
HEIGHT: 63 IN | RESPIRATION RATE: 18 BRPM | BODY MASS INDEX: 35.79 KG/M2 | SYSTOLIC BLOOD PRESSURE: 143 MMHG | OXYGEN SATURATION: 99 % | TEMPERATURE: 97.7 F | DIASTOLIC BLOOD PRESSURE: 87 MMHG | HEART RATE: 69 BPM | WEIGHT: 202 LBS

## 2023-02-09 DIAGNOSIS — M54.2 NECK PAIN: Primary | ICD-10-CM

## 2023-02-09 DIAGNOSIS — R51.9 GENERALIZED HEADACHE: ICD-10-CM

## 2023-02-09 PROCEDURE — 99283 EMERGENCY DEPT VISIT LOW MDM: CPT

## 2023-02-09 PROCEDURE — 72125 CT NECK SPINE W/O DYE: CPT

## 2023-02-09 PROCEDURE — 70450 CT HEAD/BRAIN W/O DYE: CPT

## 2023-02-09 NOTE — ED PROVIDER NOTES
Subjective   History of Present Illness  Is a 72-year-old female who had a fall.  Patient states that she injured herself last week.  Patient states that she continues to have persistent pain in her head and neck.  Patient states that she has no numbness and tingling in her extremities.  Patient has no weakness of her extremities.  Patient has no bowel or bladder incontinence.  Patient has no saddle anesthesia.  Patient denies any visual changes.  Patient has no slurred speech or confusion.  Patient denies any chest pain or shortness of breath.  Patient denies any other joint pain.  Patient states that she has not taken anything for the pain at home.        Review of Systems   Musculoskeletal: Positive for neck pain.   All other systems reviewed and are negative.      Past Medical History:   Diagnosis Date   • Allergic rhinitis    • Chronic sinusitis    • Sommer bullosa    • Disease of thyroid gland    • Diverticulosis 2013   • GERD (gastroesophageal reflux disease)    • Graves disease    • HLD (hyperlipidemia)    • HTN (hypertension)    • Obstructive sleep apnea    • Type 2 diabetes mellitus (HCC)    • Vasomotor rhinitis        Allergies   Allergen Reactions   • Metformin And Related Swelling       Past Surgical History:   Procedure Laterality Date   • CHOLECYSTECTOMY     • COLONOSCOPY  10/29/2013    diverticulosis   • COLONOSCOPY N/A 7/10/2018    Procedure: COLONOSCOPY WITH ANESTHESIA;  Surgeon: Donald Menchaca DO;  Location: [facility] ENDOSCOPY;  Service: Gastroenterology   • FOOT SURGERY     • TUBAL ABDOMINAL LIGATION     • TYMPANIC MEMBRANE REPAIR         Family History   Problem Relation Age of Onset   • Diabetes Mother    • Heart disease Mother    • Hypertension Mother    • Hypertension Father    • Diabetes Brother    • Hypertension Brother    • Hyperlipidemia Brother    • Colon polyps Brother    • Hyperlipidemia Maternal Uncle    • Thyroid disease Maternal Uncle    • Cancer Maternal Grandmother    • Colon  polyps Daughter        Social History     Socioeconomic History   • Marital status: Single   Tobacco Use   • Smoking status: Never     Passive exposure: Never   • Smokeless tobacco: Never   Vaping Use   • Vaping Use: Never used   Substance and Sexual Activity   • Alcohol use: Yes     Comment: occasionally   • Drug use: No   • Sexual activity: Yes           Objective   Physical Exam  Vitals and nursing note reviewed.   Constitutional:       Appearance: Normal appearance.   HENT:      Head: Normocephalic and atraumatic.      Mouth/Throat:      Mouth: Mucous membranes are moist.   Eyes:      Extraocular Movements: Extraocular movements intact.      Pupils: Pupils are equal, round, and reactive to light.   Neck:      Comments: Generalized tenderness palpation of bilateral trapezius muscle.  No spinal midline tenderness.  No step-offs.  Cardiovascular:      Rate and Rhythm: Normal rate and regular rhythm.      Heart sounds: Normal heart sounds.   Pulmonary:      Breath sounds: Normal breath sounds.   Abdominal:      General: Bowel sounds are normal.      Palpations: Abdomen is soft.      Tenderness: There is no abdominal tenderness.   Musculoskeletal:      Cervical back: Normal range of motion and neck supple. No rigidity.   Skin:     General: Skin is warm and dry.   Neurological:      General: No focal deficit present.      Mental Status: She is alert and oriented to person, place, and time.      Cranial Nerves: No cranial nerve deficit.      Sensory: No sensory deficit.      Motor: No weakness.   Psychiatric:         Mood and Affect: Mood normal.         Behavior: Behavior normal.         Procedures           ED Course  ED Course as of 02/09/23 1302   Thu Feb 09, 2023   1255 CT CERVICAL SPINE WO CONTRAST- 2/9/2023 12:25 PM CST     HISTORY: Injury/fall     COMPARISON: None      DOSE LENGTH PRODUCT: 448 mGy cm. All CT scans are performed using dose  optimization techniques as appropriate to the performed exam  and  including at least one of the following: Automated exposure control,  adjustment of the mA and/or kV according to size, and the use of the  iterative reconstruction technique.     TECHNIQUE: Serial helical tomographic images of the cervical spine were  obtained without the use of intravenous contrast. Additionally, sagittal  and coronal reformatted images were also provided for review.      FINDINGS:   Alignment: There is loss of cervical lordosis. No evidence of listhesis.     Bones: There is no evidence of fracture. Vertebral body heights are  maintained.     Disc spaces: Maintained.     Canal and neuroforamina: Bony neural foraminal encroachment is noted at  several levels related to facet and uncinate spurring.     Soft tissues: Unremarkable.     Lung apices: Clear. No pneumothorax.     IMPRESSION:  1. No evidence of acute osseous injury or malalignment in the cervical  spine.        This report was finalized on 02/09/2023 12:46 by Dr. Nikita Ch MD. [RP]      ED Course User Index  [RP] Ellis Wang MD                                         CT HEAD WO CONTRAST- 2/9/2023 12:25 PM CST     HISTORY: Injury/fall     COMPARISON: None      DLP: 576 mGy cm. All CT scans are performed using dose optimization  techniques as appropriate to the performed exam and including at least  one of the following: Automated exposure control, adjustment of the mA  and/or kV according to size, and the use of the iterative reconstruction  technique.     TECHNIQUE: Serial axial tomographic images of the brain were obtained  without the use of intravenous contrast.      FINDINGS:   The midline structures are nondisplaced. There is mild cerebral and  cerebellar atrophy, with an associated increase in the prominence of the  ventricles and sulci. The basilar cisterns are normal in size and  configuration. There is no evidence of intracranial hemorrhage or  mass-effect. There is low attenuation in the periventricular  white  matter, consistent with chronic ischemic change. There are no abnormal  extra-axial fluid collections. There is no evidence of tonsillar  herniation.      The included orbits and their contents are unremarkable. The visualized  paranasal sinuses, mastoid air cells and middle ear cavities are clear.  The visualized osseous structures and overlying soft tissues of the  skull and face are intact.      IMPRESSION:     1. Mild cerebral and cerebellar atrophy with chronic microvascular  disease but no evidence of acute intracranial process.        This report was finalized on 02/09/2023 12:43 by Dr. Nikita Ch MD.  Medical Decision Making  Is a 72-year-old female who had a fall last week.  Patient had CT scan of the head and neck done.  Patient had no acute findings.  Patient has not been taking any Tylenol or Motrin for the pain.  Advised to try to do that for pain relief.  Patient's neuro exam was unremarkable.  Patient be discharged home in a stable condition.  All questions were answered for the patient prior to discharge.  Patient was advised to return to the emergency room with new or worsening symptoms.  Patient verbalized understanding was agreeable to plan as discussed    Amount and/or Complexity of Data Reviewed  Radiology: ordered. Decision-making details documented in ED Course.          Final diagnoses:   Neck pain   Generalized headache       ED Disposition  ED Disposition     ED Disposition   Discharge    Condition   Stable    Comment   --             Sandra Wells PA  [address] [id number] [phone]    Schedule an appointment as soon as possible for a visit       [facility] Emergency Department  [address] [place] [id number] [phone]    As needed, If symptoms worsen         Medication List      No changes were made to your prescriptions during this visit.          Ellis Wang MD  02/09/23 0901

## 2023-03-27 RX ORDER — AZELASTINE 1 MG/ML
SPRAY, METERED NASAL
Qty: 120 ML | Refills: 3 | Status: SHIPPED | OUTPATIENT
Start: 2023-03-27

## 2023-05-11 NOTE — PROGRESS NOTES
[facility] - PODIATRY    Today's Date: 05/22/23    Patient Name: Camila Wallace  MRN: [id number]  CSN: [id number]  PCP: Sandra Wells PA  Referring Provider: No ref. provider found    SUBJECTIVE     Chief Complaint   Patient presents with    Follow-up     well be here around 2 pm Sandra Wells 03/16/2023 Return in about 3 months- pt states feet doing ok- pt denies pain     Diabetes     Hasnt checked     HPI: Camila Wallace, a 73 y.o.female, comes to clinic as a(n) established patient presenting for diabetic foot exam, complaining of foot pain, complaining of ankle pain, complaining of painful toenails and calluses . Patient has h/o farida bullosa, thyroid disease, GERD, Graves Disease, HLD, HTN, FAUSTO, DM2 . Patient is NIDDM and unsure of last BG level. Hasn't checked recently.  Reports occasional numbness/tingling in feet. Denies open wounds or sores. States that her toenails are long, thick and discolored.  States that she recently bumped her left third/fourth toe on a table at her house with resultant bruising at the fourth and fifth metatarsals/MTPJ.  Denies pain today. Relates previous treatment(s) including care by podiatry . Denies any constitutional symptoms. No other pedal complaints at this time.    Past Medical History:   Diagnosis Date    Allergic rhinitis     Chronic sinusitis     Farida bullosa     Disease of thyroid gland     Diverticulosis 2013    GERD (gastroesophageal reflux disease)     Graves disease     HLD (hyperlipidemia)     HTN (hypertension)     Obstructive sleep apnea     Type 2 diabetes mellitus     Vasomotor rhinitis      Past Surgical History:   Procedure Laterality Date    CHOLECYSTECTOMY      COLONOSCOPY  10/29/2013    diverticulosis    COLONOSCOPY N/A 7/10/2018    Procedure: COLONOSCOPY WITH ANESTHESIA;  Surgeon: Donald Menchaca DO;  Location: [facility] ENDOSCOPY;  Service: Gastroenterology    FOOT SURGERY      TUBAL ABDOMINAL LIGATION       TYMPANIC MEMBRANE REPAIR       Family History   Problem Relation Age of Onset    Diabetes Mother     Heart disease Mother     Hypertension Mother     Hypertension Father     Diabetes Brother     Hypertension Brother     Hyperlipidemia Brother     Colon polyps Brother     Hyperlipidemia Maternal Uncle     Thyroid disease Maternal Uncle     Cancer Maternal Grandmother     Colon polyps Daughter      Social History     Socioeconomic History    Marital status: Single   Tobacco Use    Smoking status: Never     Passive exposure: Never    Smokeless tobacco: Never   Vaping Use    Vaping Use: Never used   Substance and Sexual Activity    Alcohol use: Yes     Comment: occasionally    Drug use: No    Sexual activity: Yes     Allergies   Allergen Reactions    Metformin And Related Swelling     Current Outpatient Medications   Medication Sig Dispense Refill    Alcohol Swabs (B-D SINGLE USE SWABS REGULAR) pads       alendronate (FOSAMAX) 70 MG tablet Take 1 tablet by mouth Every 7 (Seven) Days.      allopurinol (ZYLOPRIM) 100 MG tablet Take 1 tablet by mouth Daily.      amoxicillin (AMOXIL) 875 MG tablet Take 1 tablet by mouth 2 (Two) Times a Day. 14 tablet 0    atorvastatin (LIPITOR) 80 MG tablet Take 1 tablet by mouth Daily.      azelastine (ASTELIN) 0.1 % nasal spray USE 2 SPRAYS IN EACH NOSTRIL TWICE DAILY AS DIRECTED 120 mL 3    bisacodyl (Dulcolax) 5 MG EC tablet Take 2 each evening for 3 days 6 tablet 0    Blood Glucose Calibration (True Metrix Level 1) Low solution       Blood Glucose Monitoring Suppl (True Metrix Meter) w/Device kit       cetirizine (zyrTEC) 10 MG tablet Take 1 tablet by mouth At Night As Needed for Allergies (sore throat and cough). 30 tablet 11    cloNIDine (CATAPRES) 0.1 MG tablet Take 1 tablet by mouth Every 6 (Six) Hours As Needed for High Blood Pressure (for blood pressure >160/110). 30 tablet 0    Glucose Blood (BLOOD GLUCOSE TEST) strip Test blood sugar daily and prn      guaifenesin (ROBITUSSIN)  100 MG/5ML liquid Take 10 mL by mouth Every 4 (Four) Hours As Needed for Cough or Congestion. 118 mL 0    HYDROcodone-acetaminophen (NORCO)  MG per tablet TAKE 1 TABLET BY MOUTH THREE TIMES A DAY  EFFECTIVE DATE 3 21 19  0    ipratropium (ATROVENT) 0.06 % nasal spray USE 2 SPRAYS IN EACH NOSTRIL TWICE DAILY AS DIRECTED 75 mL 2    Lancet Devices (TRUEdraw Lancing Device) misc       lidocaine-prilocaine (EMLA) 2.5-2.5 % cream Apply  topically to the appropriate area as directed As Needed for Mild Pain .      metoprolol succinate XL (TOPROL-XL) 100 MG 24 hr tablet Take 1 tablet by mouth Daily.      olmesartan-hydrochlorothiazide (BENICAR HCT) 40-12.5 MG per tablet       pantoprazole (PROTONIX) 40 MG EC tablet Take 1 tablet by mouth Daily.      polyethylene glycol (MiraLax) 17 GM/SCOOP powder Take 2 capfuls (in 2 cups of juice or drink of choice)  3 times daily for 3 days, then 1 capful daily in 1 cup drink of choice 850 g 0    SYNTHROID 100 MCG tablet Take 112 mcg by mouth Daily.      topiramate (TOPAMAX) 100 MG tablet Take 1 tablet by mouth 2 (Two) Times a Day.      TRUEplus Lancets 33G misc       vitamin D (ERGOCALCIFEROL) 73237 UNITS capsule capsule Take 1 capsule by mouth 1 (One) Time Per Week.       No current facility-administered medications for this visit.     Review of Systems   Constitutional:  Negative for chills and fever.   HENT:  Negative for congestion.    Respiratory:  Negative for shortness of breath.    Cardiovascular:  Positive for leg swelling. Negative for chest pain.   Gastrointestinal:  Negative for constipation, diarrhea, nausea and vomiting.   Musculoskeletal:  Negative for arthralgias and joint swelling.   Skin:  Negative for wound.        calluses   Neurological:  Positive for numbness.     OBJECTIVE     Vitals:    05/22/23 1325   BP: 126/78   Pulse: 73   SpO2: 99%       PHYSICAL EXAM  GEN:   Accompanied by none.     Foot/Ankle Exam    GENERAL  Appearance:  appears stated  age  Orientation:  AAOx3  Affect:  appropriate  Gait:  unimpaired  Assistance:  independent  Right shoe gear: casual shoe  Left shoe gear: casual shoe    VASCULAR     Right Foot Vascularity   Dorsalis pedis:  2+  Posterior tibial:  2+  Skin temperature:  warm  Edema grading:  None  CFT:  3  Pedal hair growth:  Present  Varicosities:  none     Left Foot Vascularity   Dorsalis pedis:  2+  Posterior tibial:  2+  Skin temperature:  warm  Edema grading:  None  CFT:  3  Pedal hair growth:  Present  Varicosities:  none     NEUROLOGIC     Right Foot Neurologic   Light touch sensation: diminished  Vibratory sensation: diminished  Hot/Cold sensation: diminished  Protective Sensation using [place] Monofilament:   Sites intact: 7  Sites tested: 10     Left Foot Neurologic   Light touch sensation: diminished  Vibratory sensation: diminished  Hot/Cold sensation:  diminished  Protective Sensation using [place] Monofilament:   Sites intact: 7  Sites tested: 10    MUSCULOSKELETAL     Right Foot Musculoskeletal   Ecchymosis:  none  Tenderness:  toenail problem    Arch:  Normal  Hammertoe:  Fifth toe and second toe (2nd toe is starting override hallux)  Hallux valgus: Yes (Medial bony eminence with abduction of hallux)    Hallux limitus: No       Left Foot Musculoskeletal   Ecchymosis:  metatarsal 4 and metatarsal 5  Tenderness:  toenail problem  Arch:  Normal  Hammertoe:  Fifth toe  Hallux valgus: No    Hallux limitus: Yes (s/p fusion)      MUSCLE STRENGTH     Right Foot Muscle Strength   Foot dorsiflexion:  5  Foot plantar flexion:  5  Foot inversion:  5  Foot eversion:  5     Left Foot Muscle Strength   Foot dorsiflexion:  5  Foot plantar flexion:  5  Foot inversion:  5  Foot eversion:  5    RANGE OF MOTION     Right Foot Range of Motion   Foot and ankle ROM within normal limits    Foot eversion:  with pain     Left Foot Range of Motion   Foot and ankle ROM within normal limits    1st MTP extension: 0  1st MTP  flexion: 0    DERMATOLOGIC      Right Foot Dermatologic   Skin  Right foot skin is intact.   Nails  1.  Positive for elongated, onychomycosis and abnormal thickness.  2.  Positive for elongated, onychomycosis and abnormal thickness.  3.  Positive for elongated, onychomycosis and abnormal thickness.  4.  Positive for elongated, onychomycosis and abnormal thickness.  5.  Positive for elongated, onychomycosis and abnormal thickness.     Left Foot Dermatologic   Skin  Left foot skin is intact.   Nails  1.  Positive for elongated, onychomycosis and abnormal thickness.  2.  Positive for elongated, onychomycosis and abnormal thickness.  3.  Positive for elongated, onychomycosis and abnormal thickness.  4.  Positive for elongated, onychomycosis and abnormally thick.  5.  Positive for elongated, onychomycosis and abnormally thick.    RADIOLOGY/NUCLEAR:  No results found.    LABORATORY/CULTURE RESULTS:      PATHOLOGY RESULTS:       ASSESSMENT/PLAN     Diagnoses and all orders for this visit:    1. Onychomycosis (Primary)    2. Type 2 diabetes mellitus without complication, without long-term current use of insulin    3. Hallux valgus, right    4. Hammer toes of both feet    5. Chronic kidney disease, stage 3b      Comprehensive lower extremity examination and evaluation was performed.  Discussed findings and treatment plan including risks, benefits, and treatment options with patient in detail. Patient agreed with treatment plan.  After verbal consent obtained, nail(s) x10 debrided of length and thickness with nail nipper without incidence  Patient may maintain nails and calluses at home utilizing emery board or pumice stone between visits as needed  Reviewed at home diabetic foot care including daily foot checks   Continue diabetic monitoring and control under direction of PCP   Remain conservative with foot deformities   An After Visit Summary was printed and given to the patient at discharge, including (if requested) any  available informative/educational handouts regarding diagnosis, treatment, or medications. All questions were answered to patient/family satisfaction. Should symptoms fail to improve or worsen they agree to call or return to clinic or to go to the Emergency Department. Discussed the importance of following up with any needed screening tests/labs/specialist appointments and any requested follow-up recommended by me today. Importance of maintaining follow-up discussed and patient accepts that missed appointments can delay diagnosis and potentially lead to worsening of conditions.  Return in about 3 months (around 8/22/2023)., or sooner if acute issues arise.    Lab Frequency Next Occurrence       This document has been electronically signed by TONEY Connor on May 22, 2023 16:11 CDT

## 2023-05-19 ENCOUNTER — TELEPHONE (OUTPATIENT)
Dept: PODIATRY | Facility: CLINIC | Age: 73
End: 2023-05-19
Payer: MEDICARE

## 2023-05-22 ENCOUNTER — OFFICE VISIT (OUTPATIENT)
Dept: PODIATRY | Facility: CLINIC | Age: 73
End: 2023-05-22
Payer: MEDICARE

## 2023-05-22 VITALS
SYSTOLIC BLOOD PRESSURE: 126 MMHG | BODY MASS INDEX: 34.73 KG/M2 | HEIGHT: 63 IN | DIASTOLIC BLOOD PRESSURE: 78 MMHG | HEART RATE: 73 BPM | OXYGEN SATURATION: 99 % | WEIGHT: 196 LBS

## 2023-05-22 DIAGNOSIS — M20.42 HAMMER TOES OF BOTH FEET: ICD-10-CM

## 2023-05-22 DIAGNOSIS — E11.9 TYPE 2 DIABETES MELLITUS WITHOUT COMPLICATION, WITHOUT LONG-TERM CURRENT USE OF INSULIN: ICD-10-CM

## 2023-05-22 DIAGNOSIS — N18.32 CHRONIC KIDNEY DISEASE, STAGE 3B: ICD-10-CM

## 2023-05-22 DIAGNOSIS — M20.41 HAMMER TOES OF BOTH FEET: ICD-10-CM

## 2023-05-22 DIAGNOSIS — B35.1 ONYCHOMYCOSIS: Primary | ICD-10-CM

## 2023-05-22 DIAGNOSIS — M20.11 HALLUX VALGUS, RIGHT: ICD-10-CM

## 2023-05-22 PROCEDURE — 1159F MED LIST DOCD IN RCRD: CPT | Performed by: NURSE PRACTITIONER

## 2023-05-22 PROCEDURE — 1160F RVW MEDS BY RX/DR IN RCRD: CPT | Performed by: NURSE PRACTITIONER

## 2023-05-22 PROCEDURE — 11721 DEBRIDE NAIL 6 OR MORE: CPT | Performed by: NURSE PRACTITIONER

## 2023-05-25 ENCOUNTER — OFFICE VISIT (OUTPATIENT)
Dept: GASTROENTEROLOGY | Facility: CLINIC | Age: 73
End: 2023-05-25
Payer: MEDICARE

## 2023-05-25 VITALS
WEIGHT: 195 LBS | OXYGEN SATURATION: 98 % | TEMPERATURE: 98.3 F | DIASTOLIC BLOOD PRESSURE: 80 MMHG | HEIGHT: 63 IN | BODY MASS INDEX: 34.55 KG/M2 | SYSTOLIC BLOOD PRESSURE: 124 MMHG

## 2023-05-25 DIAGNOSIS — Z86.010 HISTORY OF COLON POLYPS: Primary | ICD-10-CM

## 2023-05-25 RX ORDER — SODIUM, POTASSIUM,MAG SULFATES 17.5-3.13G
177 SOLUTION, RECONSTITUTED, ORAL ORAL TAKE AS DIRECTED
Qty: 177 ML | Refills: 0 | Status: CANCELLED | OUTPATIENT
Start: 2023-05-25

## 2023-05-25 NOTE — PROGRESS NOTES
Chief Complaint   Patient presents with    Colonoscopy     7-10-18 colon polyp 5 year recall       PCP: Sandra Wells PA  REFER: No ref. provider found    Subjective     HPI    Camila Wallace is a 73 y.o. female who presents to office for preventative maintenance.  There is  a personal history of colon polyps.  There is not a history of colon cancer.  She does not have complaints of nausea/vomiting, change in bowels, weight loss, no BRBPR, no melena.  There is not a family history of colon cancer in first degree relative.  There is not a family history of colon polyps in first degree relative.  Camila Wallace last colonoscopy-2018 .  Bowels do not move on regular basis.  Uses miralax on regular basis to facilitate bowel movement.      COLONOSCOPY (07/10/2018 12:06) -polypectomy  SCANNED - COLONOSCOPY (10/29/2013 00:00) -diverticulosis   SCANNED - COLONOSCOPY (10/29/2013 00:00) -diverticulosis     No results for input(s): GLUCOSE, NA, K, CREATININE, BUN, BCR, ALKPHOS, ALT, AST, BILITOT, ALBUMIN in the last 92002 hours.    No results for input(s): EGFRIFNONA, EGFRIFAFRI, EGFRRESULT in the last 99594 hours.     Past Medical History:   Diagnosis Date    Allergic rhinitis     Chronic sinusitis     Sommer bullosa     Disease of thyroid gland     Diverticulosis 2013    GERD (gastroesophageal reflux disease)     Graves disease     HLD (hyperlipidemia)     HTN (hypertension)     Obstructive sleep apnea     Type 2 diabetes mellitus     Vasomotor rhinitis      Past Surgical History:   Procedure Laterality Date    CHOLECYSTECTOMY      COLONOSCOPY  10/29/2013    diverticulosis    COLONOSCOPY N/A 7/10/2018    Procedure: COLONOSCOPY WITH ANESTHESIA;  Surgeon: Donald Menchaca DO;  Location: [facility] ENDOSCOPY;  Service: Gastroenterology    FOOT SURGERY      TUBAL ABDOMINAL LIGATION      TYMPANIC MEMBRANE REPAIR       Outpatient Medications Marked as Taking for the 5/25/23 encounter (Office Visit) with Jas Trinidad  TONEY Esquivel   Medication Sig Dispense Refill    alendronate (FOSAMAX) 70 MG tablet Take 1 tablet by mouth Every 7 (Seven) Days.      allopurinol (ZYLOPRIM) 100 MG tablet Take 1 tablet by mouth Daily.      atorvastatin (LIPITOR) 80 MG tablet Take 1 tablet by mouth Daily.      azelastine (ASTELIN) 0.1 % nasal spray USE 2 SPRAYS IN EACH NOSTRIL TWICE DAILY AS DIRECTED 120 mL 3    bisacodyl (Dulcolax) 5 MG EC tablet Take 2 each evening for 3 days 6 tablet 0    cetirizine (zyrTEC) 10 MG tablet Take 1 tablet by mouth At Night As Needed for Allergies (sore throat and cough). 30 tablet 11    cloNIDine (CATAPRES) 0.1 MG tablet Take 1 tablet by mouth Every 6 (Six) Hours As Needed for High Blood Pressure (for blood pressure >160/110). 30 tablet 0    guaifenesin (ROBITUSSIN) 100 MG/5ML liquid Take 10 mL by mouth Every 4 (Four) Hours As Needed for Cough or Congestion. 118 mL 0    HYDROcodone-acetaminophen (NORCO)  MG per tablet TAKE 1 TABLET BY MOUTH THREE TIMES A DAY  EFFECTIVE DATE 3 21 19  0    ipratropium (ATROVENT) 0.06 % nasal spray USE 2 SPRAYS IN EACH NOSTRIL TWICE DAILY AS DIRECTED 75 mL 2    lidocaine-prilocaine (EMLA) 2.5-2.5 % cream Apply  topically to the appropriate area as directed As Needed for Mild Pain .      metoprolol succinate XL (TOPROL-XL) 100 MG 24 hr tablet Take 1 tablet by mouth Daily.      olmesartan-hydrochlorothiazide (BENICAR HCT) 40-12.5 MG per tablet       pantoprazole (PROTONIX) 40 MG EC tablet Take 1 tablet by mouth Daily.      polyethylene glycol (MiraLax) 17 GM/SCOOP powder Take 2 capfuls (in 2 cups of juice or drink of choice)  3 times daily for 3 days, then 1 capful daily in 1 cup drink of choice 850 g 0    SYNTHROID 100 MCG tablet Take 112 mcg by mouth Daily.      topiramate (TOPAMAX) 100 MG tablet Take 1 tablet by mouth 2 (Two) Times a Day.      vitamin D (ERGOCALCIFEROL) 82521 UNITS capsule capsule Take 1 capsule by mouth 1 (One) Time Per Week.       Allergies   Allergen Reactions     Metformin And Related Swelling     Social History     Socioeconomic History    Marital status: Single   Tobacco Use    Smoking status: Never     Passive exposure: Never    Smokeless tobacco: Never   Vaping Use    Vaping Use: Never used   Substance and Sexual Activity    Alcohol use: Yes     Comment: occasionally    Drug use: No    Sexual activity: Yes     Review of Systems   Constitutional:  Negative for fever and unexpected weight change.   HENT:  Negative for trouble swallowing.    Respiratory:  Negative for shortness of breath.    Cardiovascular:  Negative for chest pain.   Gastrointestinal:  Negative for abdominal pain and anal bleeding.   Objective   Vitals:    05/25/23 0931   BP: 124/80   Temp: 98.3 °F (36.8 °C)   SpO2: 98%     Physical Exam  Constitutional:       Appearance: Normal appearance. She is well-developed.   Eyes:      General: No scleral icterus.  Cardiovascular:      Heart sounds: Normal heart sounds. No murmur heard.  Pulmonary:      Effort: Pulmonary effort is normal.   Abdominal:      General: Bowel sounds are normal. There is no distension.      Palpations: Abdomen is soft.      Tenderness: There is no abdominal tenderness. There is no guarding.   Skin:     General: Skin is warm and dry.      Coloration: Skin is not jaundiced.   Neurological:      Mental Status: She is alert.   Psychiatric:         Behavior: Behavior is cooperative.     Imaging Results (Most Recent)       None          Body mass index is 34.54 kg/m².    Assessment & Plan   Diagnoses and all orders for this visit:    1. History of colon polyps (Primary)  -     Case Request; Standing  -     Case Request    Other orders  -     Implement Anesthesia Orders Day of Procedure; Standing  -     Obtain Informed Consent; Standing      COLONOSCOPY WITH ANESTHESIA (N/A)    Miralax prep     Patient is to continue all blood pressure and cardiac medications prior to procedure and has been advised to take medications morning of procedure    Pt verbalized understanding     Advised pt to stop use of NSAIDs, Fish Oil, and MV 5 days prior to procedure, per Dr Menchaca protocol.  Tylenol based products are ok to take.  Pt verbalized understanding.     All risks, benefits, alternatives, and indications of colonoscopy procedure have been discussed with the patient. Risks to include perforation of the colon requiring possible surgery or colostomy, risk of bleeding from biopsies or removal of colon tissue, possibility of missing a colon polyp or cancer, or adverse drug reaction.  Benefits to include the diagnosis and management of disease of the colon and rectum. Alternatives to include barium enema, radiographic evaluation, lab testing or no intervention. She verbalizes understanding and agrees.         Jas Trinidad, APRN  05/25/23        There are no Patient Instructions on file for this visit.

## 2023-05-25 NOTE — H&P (VIEW-ONLY)
Chief Complaint   Patient presents with    Colonoscopy     7-10-18 colon polyp 5 year recall       PCP: Sandra Wells PA  REFER: No ref. provider found    Subjective     HPI    Camila Wallace is a 73 y.o. female who presents to office for preventative maintenance.  There is  a personal history of colon polyps.  There is not a history of colon cancer.  She does not have complaints of nausea/vomiting, change in bowels, weight loss, no BRBPR, no melena.  There is not a family history of colon cancer in first degree relative.  There is not a family history of colon polyps in first degree relative.  Camila Wallace last colonoscopy-2018 .  Bowels do not move on regular basis.  Uses miralax on regular basis to facilitate bowel movement.      COLONOSCOPY (07/10/2018 12:06) -polypectomy  SCANNED - COLONOSCOPY (10/29/2013 00:00) -diverticulosis   SCANNED - COLONOSCOPY (10/29/2013 00:00) -diverticulosis     No results for input(s): GLUCOSE, NA, K, CREATININE, BUN, BCR, ALKPHOS, ALT, AST, BILITOT, ALBUMIN in the last 04777 hours.    No results for input(s): EGFRIFNONA, EGFRIFAFRI, EGFRRESULT in the last 11509 hours.     Past Medical History:   Diagnosis Date    Allergic rhinitis     Chronic sinusitis     Sommer bullosa     Disease of thyroid gland     Diverticulosis 2013    GERD (gastroesophageal reflux disease)     Graves disease     HLD (hyperlipidemia)     HTN (hypertension)     Obstructive sleep apnea     Type 2 diabetes mellitus     Vasomotor rhinitis      Past Surgical History:   Procedure Laterality Date    CHOLECYSTECTOMY      COLONOSCOPY  10/29/2013    diverticulosis    COLONOSCOPY N/A 7/10/2018    Procedure: COLONOSCOPY WITH ANESTHESIA;  Surgeon: Donald Menchaca DO;  Location: [facility] ENDOSCOPY;  Service: Gastroenterology    FOOT SURGERY      TUBAL ABDOMINAL LIGATION      TYMPANIC MEMBRANE REPAIR       Outpatient Medications Marked as Taking for the 5/25/23 encounter (Office Visit) with Jas Trinidad  TONEY Esquivel   Medication Sig Dispense Refill    alendronate (FOSAMAX) 70 MG tablet Take 1 tablet by mouth Every 7 (Seven) Days.      allopurinol (ZYLOPRIM) 100 MG tablet Take 1 tablet by mouth Daily.      atorvastatin (LIPITOR) 80 MG tablet Take 1 tablet by mouth Daily.      azelastine (ASTELIN) 0.1 % nasal spray USE 2 SPRAYS IN EACH NOSTRIL TWICE DAILY AS DIRECTED 120 mL 3    bisacodyl (Dulcolax) 5 MG EC tablet Take 2 each evening for 3 days 6 tablet 0    cetirizine (zyrTEC) 10 MG tablet Take 1 tablet by mouth At Night As Needed for Allergies (sore throat and cough). 30 tablet 11    cloNIDine (CATAPRES) 0.1 MG tablet Take 1 tablet by mouth Every 6 (Six) Hours As Needed for High Blood Pressure (for blood pressure >160/110). 30 tablet 0    guaifenesin (ROBITUSSIN) 100 MG/5ML liquid Take 10 mL by mouth Every 4 (Four) Hours As Needed for Cough or Congestion. 118 mL 0    HYDROcodone-acetaminophen (NORCO)  MG per tablet TAKE 1 TABLET BY MOUTH THREE TIMES A DAY  EFFECTIVE DATE 3 21 19  0    ipratropium (ATROVENT) 0.06 % nasal spray USE 2 SPRAYS IN EACH NOSTRIL TWICE DAILY AS DIRECTED 75 mL 2    lidocaine-prilocaine (EMLA) 2.5-2.5 % cream Apply  topically to the appropriate area as directed As Needed for Mild Pain .      metoprolol succinate XL (TOPROL-XL) 100 MG 24 hr tablet Take 1 tablet by mouth Daily.      olmesartan-hydrochlorothiazide (BENICAR HCT) 40-12.5 MG per tablet       pantoprazole (PROTONIX) 40 MG EC tablet Take 1 tablet by mouth Daily.      polyethylene glycol (MiraLax) 17 GM/SCOOP powder Take 2 capfuls (in 2 cups of juice or drink of choice)  3 times daily for 3 days, then 1 capful daily in 1 cup drink of choice 850 g 0    SYNTHROID 100 MCG tablet Take 112 mcg by mouth Daily.      topiramate (TOPAMAX) 100 MG tablet Take 1 tablet by mouth 2 (Two) Times a Day.      vitamin D (ERGOCALCIFEROL) 62839 UNITS capsule capsule Take 1 capsule by mouth 1 (One) Time Per Week.       Allergies   Allergen Reactions     Metformin And Related Swelling     Social History     Socioeconomic History    Marital status: Single   Tobacco Use    Smoking status: Never     Passive exposure: Never    Smokeless tobacco: Never   Vaping Use    Vaping Use: Never used   Substance and Sexual Activity    Alcohol use: Yes     Comment: occasionally    Drug use: No    Sexual activity: Yes     Review of Systems   Constitutional:  Negative for fever and unexpected weight change.   HENT:  Negative for trouble swallowing.    Respiratory:  Negative for shortness of breath.    Cardiovascular:  Negative for chest pain.   Gastrointestinal:  Negative for abdominal pain and anal bleeding.   Objective   Vitals:    05/25/23 0931   BP: 124/80   Temp: 98.3 °F (36.8 °C)   SpO2: 98%     Physical Exam  Constitutional:       Appearance: Normal appearance. She is well-developed.   Eyes:      General: No scleral icterus.  Cardiovascular:      Heart sounds: Normal heart sounds. No murmur heard.  Pulmonary:      Effort: Pulmonary effort is normal.   Abdominal:      General: Bowel sounds are normal. There is no distension.      Palpations: Abdomen is soft.      Tenderness: There is no abdominal tenderness. There is no guarding.   Skin:     General: Skin is warm and dry.      Coloration: Skin is not jaundiced.   Neurological:      Mental Status: She is alert.   Psychiatric:         Behavior: Behavior is cooperative.     Imaging Results (Most Recent)       None          Body mass index is 34.54 kg/m².    Assessment & Plan   Diagnoses and all orders for this visit:    1. History of colon polyps (Primary)  -     Case Request; Standing  -     Case Request    Other orders  -     Implement Anesthesia Orders Day of Procedure; Standing  -     Obtain Informed Consent; Standing      COLONOSCOPY WITH ANESTHESIA (N/A)    Miralax prep     Patient is to continue all blood pressure and cardiac medications prior to procedure and has been advised to take medications morning of procedure    Pt verbalized understanding     Advised pt to stop use of NSAIDs, Fish Oil, and MV 5 days prior to procedure, per Dr Menchaca protocol.  Tylenol based products are ok to take.  Pt verbalized understanding.     All risks, benefits, alternatives, and indications of colonoscopy procedure have been discussed with the patient. Risks to include perforation of the colon requiring possible surgery or colostomy, risk of bleeding from biopsies or removal of colon tissue, possibility of missing a colon polyp or cancer, or adverse drug reaction.  Benefits to include the diagnosis and management of disease of the colon and rectum. Alternatives to include barium enema, radiographic evaluation, lab testing or no intervention. She verbalizes understanding and agrees.         Jas Trinidad, APRN  05/25/23        There are no Patient Instructions on file for this visit.

## 2023-05-26 PROBLEM — Z86.010 HISTORY OF COLON POLYPS: Status: ACTIVE | Noted: 2023-05-26

## 2023-05-26 PROBLEM — Z86.0100 HISTORY OF COLON POLYPS: Status: ACTIVE | Noted: 2023-05-26

## 2023-06-03 ENCOUNTER — APPOINTMENT (OUTPATIENT)
Dept: GENERAL RADIOLOGY | Facility: HOSPITAL | Age: 73
End: 2023-06-03
Payer: MEDICARE

## 2023-06-03 ENCOUNTER — APPOINTMENT (OUTPATIENT)
Dept: CT IMAGING | Facility: HOSPITAL | Age: 73
End: 2023-06-03
Payer: MEDICARE

## 2023-06-03 ENCOUNTER — APPOINTMENT (OUTPATIENT)
Dept: ULTRASOUND IMAGING | Facility: HOSPITAL | Age: 73
End: 2023-06-03
Payer: MEDICARE

## 2023-06-03 ENCOUNTER — HOSPITAL ENCOUNTER (EMERGENCY)
Facility: HOSPITAL | Age: 73
Discharge: HOME | End: 2023-06-03
Attending: EMERGENCY MEDICINE
Payer: MEDICARE

## 2023-06-03 VITALS
DIASTOLIC BLOOD PRESSURE: 75 MMHG | HEART RATE: 68 BPM | HEIGHT: 63 IN | BODY MASS INDEX: 34.55 KG/M2 | WEIGHT: 195 LBS | SYSTOLIC BLOOD PRESSURE: 118 MMHG | OXYGEN SATURATION: 99 % | RESPIRATION RATE: 16 BRPM | TEMPERATURE: 98 F

## 2023-06-03 DIAGNOSIS — S16.1XXA STRAIN OF NECK MUSCLE, INITIAL ENCOUNTER: ICD-10-CM

## 2023-06-03 DIAGNOSIS — M79.604 PAIN IN BOTH LOWER EXTREMITIES: Primary | ICD-10-CM

## 2023-06-03 DIAGNOSIS — M79.605 PAIN IN BOTH LOWER EXTREMITIES: Primary | ICD-10-CM

## 2023-06-03 LAB
ANION GAP SERPL CALCULATED.3IONS-SCNC: 10 MMOL/L (ref 5–15)
BASOPHILS # BLD AUTO: 0.04 10*3/MM3 (ref 0–0.2)
BASOPHILS NFR BLD AUTO: 0.6 % (ref 0–1.5)
BUN SERPL-MCNC: 22 MG/DL (ref 8–23)
BUN/CREAT SERPL: 16.2 (ref 7–25)
CALCIUM SPEC-SCNC: 9.4 MG/DL (ref 8.6–10.5)
CHLORIDE SERPL-SCNC: 104 MMOL/L (ref 98–107)
CO2 SERPL-SCNC: 27 MMOL/L (ref 22–29)
CREAT SERPL-MCNC: 1.36 MG/DL (ref 0.57–1)
DEPRECATED RDW RBC AUTO: 49.1 FL (ref 37–54)
EGFRCR SERPLBLD CKD-EPI 2021: 41.2 ML/MIN/1.73
EOSINOPHIL # BLD AUTO: 0.15 10*3/MM3 (ref 0–0.4)
EOSINOPHIL NFR BLD AUTO: 2.2 % (ref 0.3–6.2)
ERYTHROCYTE [DISTWIDTH] IN BLOOD BY AUTOMATED COUNT: 15.1 % (ref 12.3–15.4)
GLUCOSE SERPL-MCNC: 107 MG/DL (ref 65–99)
HCT VFR BLD AUTO: 35.3 % (ref 34–46.6)
HGB BLD-MCNC: 11.2 G/DL (ref 12–15.9)
IMM GRANULOCYTES # BLD AUTO: 0.02 10*3/MM3 (ref 0–0.05)
IMM GRANULOCYTES NFR BLD AUTO: 0.3 % (ref 0–0.5)
LYMPHOCYTES # BLD AUTO: 2.01 10*3/MM3 (ref 0.7–3.1)
LYMPHOCYTES NFR BLD AUTO: 30 % (ref 19.6–45.3)
MCH RBC QN AUTO: 28.3 PG (ref 26.6–33)
MCHC RBC AUTO-ENTMCNC: 31.7 G/DL (ref 31.5–35.7)
MCV RBC AUTO: 89.1 FL (ref 79–97)
MONOCYTES # BLD AUTO: 0.55 10*3/MM3 (ref 0.1–0.9)
MONOCYTES NFR BLD AUTO: 8.2 % (ref 5–12)
NEUTROPHILS NFR BLD AUTO: 3.92 10*3/MM3 (ref 1.7–7)
NEUTROPHILS NFR BLD AUTO: 58.7 % (ref 42.7–76)
NRBC BLD AUTO-RTO: 0 /100 WBC (ref 0–0.2)
PLATELET # BLD AUTO: 313 10*3/MM3 (ref 140–450)
PMV BLD AUTO: 10.8 FL (ref 6–12)
POTASSIUM SERPL-SCNC: 4.2 MMOL/L (ref 3.5–5.2)
RBC # BLD AUTO: 3.96 10*6/MM3 (ref 3.77–5.28)
SODIUM SERPL-SCNC: 141 MMOL/L (ref 136–145)
WBC NRBC COR # BLD: 6.69 10*3/MM3 (ref 3.4–10.8)

## 2023-06-03 PROCEDURE — 72125 CT NECK SPINE W/O DYE: CPT

## 2023-06-03 PROCEDURE — 73590 X-RAY EXAM OF LOWER LEG: CPT

## 2023-06-03 PROCEDURE — 99283 EMERGENCY DEPT VISIT LOW MDM: CPT

## 2023-06-03 PROCEDURE — 93970 EXTREMITY STUDY: CPT

## 2023-06-03 PROCEDURE — 85025 COMPLETE CBC W/AUTO DIFF WBC: CPT | Performed by: EMERGENCY MEDICINE

## 2023-06-03 PROCEDURE — 80048 BASIC METABOLIC PNL TOTAL CA: CPT | Performed by: EMERGENCY MEDICINE

## 2023-06-03 PROCEDURE — 93970 EXTREMITY STUDY: CPT | Performed by: SURGERY

## 2023-06-03 PROCEDURE — 73562 X-RAY EXAM OF KNEE 3: CPT

## 2023-06-03 RX ORDER — SODIUM CHLORIDE 0.9 % (FLUSH) 0.9 %
10 SYRINGE (ML) INJECTION AS NEEDED
Status: DISCONTINUED | OUTPATIENT
Start: 2023-06-03 | End: 2023-06-03 | Stop reason: HOSPADM
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
## 2023-06-13 ENCOUNTER — HOSPITAL ENCOUNTER (OUTPATIENT)
Facility: HOSPITAL | Age: 73
Setting detail: HOSPITAL OUTPATIENT SURGERY
Discharge: HOME | End: 2023-06-13
Attending: INTERNAL MEDICINE | Admitting: INTERNAL MEDICINE
Payer: MEDICARE

## 2023-06-13 ENCOUNTER — ANESTHESIA EVENT (OUTPATIENT)
Dept: GASTROENTEROLOGY | Facility: HOSPITAL | Age: 73
End: 2023-06-13
Payer: MEDICARE

## 2023-06-13 ENCOUNTER — TELEPHONE (OUTPATIENT)
Dept: GASTROENTEROLOGY | Facility: CLINIC | Age: 73
End: 2023-06-13
Payer: MEDICARE

## 2023-06-13 ENCOUNTER — ANESTHESIA (OUTPATIENT)
Dept: GASTROENTEROLOGY | Facility: HOSPITAL | Age: 73
End: 2023-06-13
Payer: MEDICARE

## 2023-06-13 VITALS
OXYGEN SATURATION: 100 % | DIASTOLIC BLOOD PRESSURE: 82 MMHG | SYSTOLIC BLOOD PRESSURE: 124 MMHG | HEART RATE: 80 BPM | RESPIRATION RATE: 20 BRPM

## 2023-06-13 PROCEDURE — 25010000002 PROPOFOL 10 MG/ML EMULSION: Performed by: NURSE ANESTHETIST, CERTIFIED REGISTERED

## 2023-06-13 RX ORDER — LIDOCAINE HYDROCHLORIDE 10 MG/ML
0.5 INJECTION, SOLUTION EPIDURAL; INFILTRATION; INTRACAUDAL; PERINEURAL ONCE AS NEEDED
Status: DISCONTINUED | OUTPATIENT
Start: 2023-06-13 | End: 2023-06-13 | Stop reason: HOSPADM

## 2023-06-13 RX ORDER — SODIUM CHLORIDE 9 MG/ML
500 INJECTION, SOLUTION INTRAVENOUS CONTINUOUS PRN
Status: DISCONTINUED | OUTPATIENT
Start: 2023-06-13 | End: 2023-06-13 | Stop reason: HOSPADM

## 2023-06-13 RX ORDER — LIDOCAINE HYDROCHLORIDE 20 MG/ML
INJECTION, SOLUTION EPIDURAL; INFILTRATION; INTRACAUDAL; PERINEURAL AS NEEDED
Status: DISCONTINUED | OUTPATIENT
Start: 2023-06-13 | End: 2023-06-13 | Stop reason: SURG

## 2023-06-13 RX ORDER — SODIUM CHLORIDE 9 MG/ML
1000 INJECTION, SOLUTION INTRAVENOUS CONTINUOUS
Status: DISCONTINUED | OUTPATIENT
Start: 2023-06-13 | End: 2023-06-13 | Stop reason: HOSPADM

## 2023-06-13 RX ORDER — SODIUM CHLORIDE 0.9 % (FLUSH) 0.9 %
10 SYRINGE (ML) INJECTION AS NEEDED
Status: DISCONTINUED | OUTPATIENT
Start: 2023-06-13 | End: 2023-06-13 | Stop reason: HOSPADM

## 2023-06-13 RX ORDER — LIDOCAINE HYDROCHLORIDE 10 MG/ML
0.5 INJECTION, SOLUTION EPIDURAL; INFILTRATION; INTRACAUDAL; PERINEURAL ONCE AS NEEDED
Status: DISCONTINUED | OUTPATIENT
Start: 2023-06-13 | End: 2023-06-13 | Stop reason: SDUPTHER

## 2023-06-13 RX ORDER — PROPOFOL 10 MG/ML
VIAL (ML) INTRAVENOUS AS NEEDED
Status: DISCONTINUED | OUTPATIENT
Start: 2023-06-13 | End: 2023-06-13 | Stop reason: SURG

## 2023-06-13 RX ADMIN — PROPOFOL INJECTABLE EMULSION 30 MG: 10 INJECTION, EMULSION INTRAVENOUS at 11:01

## 2023-06-13 RX ADMIN — PROPOFOL INJECTABLE EMULSION 30 MG: 10 INJECTION, EMULSION INTRAVENOUS at 11:02

## 2023-06-13 RX ADMIN — PROPOFOL INJECTABLE EMULSION 30 MG: 10 INJECTION, EMULSION INTRAVENOUS at 11:04

## 2023-06-13 RX ADMIN — PROPOFOL INJECTABLE EMULSION 30 MG: 10 INJECTION, EMULSION INTRAVENOUS at 11:03

## 2023-06-13 RX ADMIN — PROPOFOL INJECTABLE EMULSION 70 MG: 10 INJECTION, EMULSION INTRAVENOUS at 10:59

## 2023-06-13 RX ADMIN — LIDOCAINE HYDROCHLORIDE 50 MG: 20 INJECTION, SOLUTION EPIDURAL; INFILTRATION; INTRACAUDAL; PERINEURAL at 10:59

## 2023-06-13 RX ADMIN — SODIUM CHLORIDE 500 ML: 9 INJECTION, SOLUTION INTRAVENOUS at 10:33

## 2023-06-13 RX ADMIN — PROPOFOL INJECTABLE EMULSION 40 MG: 10 INJECTION, EMULSION INTRAVENOUS at 11:00

## 2023-06-13 RX ADMIN — PROPOFOL INJECTABLE EMULSION 30 MG: 10 INJECTION, EMULSION INTRAVENOUS at 11:06

## 2023-06-13 NOTE — ANESTHESIA POSTPROCEDURE EVALUATION
Patient: Camila Wallace    Procedure Summary       Date: 06/13/23 Room / Location:  [facility] /  [facility]    Anesthesia Start: 1055 Anesthesia Stop: 1112    Procedure: COLONOSCOPY WITH ANESTHESIA Diagnosis:       History of colon polyps      (History of colon polyps [Z86.010])    Surgeons: Donald Menchaca DO Provider: Nic Nichols CRNA    Anesthesia Type: MAC ASA Status: 2            Anesthesia Type: MAC    Vitals  Vitals Value Taken Time   /82 06/13/23 1135   Temp     Pulse 67 06/13/23 1138   Resp 20 06/13/23 1135   SpO2 29 % 06/13/23 1138   Vitals shown include unvalidated device data.        Post Anesthesia Care and Evaluation    Patient location during evaluation: PHASE II  Patient participation: complete - patient participated  Level of consciousness: awake  Pain score: 0  Pain management: adequate    Airway patency: patent  Anesthetic complications: No anesthetic complications  PONV Status: none  Cardiovascular status: acceptable  Respiratory status: acceptable  Hydration status: acceptable

## 2023-06-13 NOTE — ANESTHESIA PREPROCEDURE EVALUATION
Anesthesia Evaluation     no history of anesthetic complications:   NPO Solid Status: > 8 hours  NPO Liquid Status: > 2 hours           Airway   Mallampati: I  TM distance: >3 FB  Neck ROM: full  No difficulty expected  Dental          Pulmonary    (+) ,sleep apnea on CPAP  (-) not a smoker  Cardiovascular   Exercise tolerance: good (4-7 METS)    (+) hypertensionhyperlipidemia  (-) pacemaker, CAD      Neuro/Psych  (-) seizures, TIA, CVA  GI/Hepatic/Renal/Endo    (+) GERD, renal disease CRI, diabetes mellitus (diet controlled) type 2, thyroid problem hypothyroidism  (-) liver disease    Musculoskeletal     Abdominal    Substance History      OB/GYN          Other                        Anesthesia Plan    ASA 2     MAC     intravenous induction     Anesthetic plan, risks, benefits, and alternatives have been provided, discussed and informed consent has been obtained with: patient.    CODE STATUS:          Disability form on your desk

## 2023-08-02 ENCOUNTER — OFFICE VISIT (OUTPATIENT)
Dept: OTOLARYNGOLOGY | Facility: CLINIC | Age: 73
End: 2023-08-02
Payer: MEDICARE

## 2023-08-02 VITALS
WEIGHT: 193 LBS | HEIGHT: 63 IN | DIASTOLIC BLOOD PRESSURE: 81 MMHG | HEART RATE: 69 BPM | SYSTOLIC BLOOD PRESSURE: 117 MMHG | BODY MASS INDEX: 34.2 KG/M2 | TEMPERATURE: 96.8 F

## 2023-08-02 DIAGNOSIS — G47.33 OSA (OBSTRUCTIVE SLEEP APNEA): ICD-10-CM

## 2023-08-02 DIAGNOSIS — J30.1 ALLERGIC RHINITIS DUE TO POLLEN, UNSPECIFIED SEASONALITY: ICD-10-CM

## 2023-08-02 DIAGNOSIS — J32.9 CHRONIC SINUSITIS, UNSPECIFIED LOCATION: ICD-10-CM

## 2023-08-02 DIAGNOSIS — H61.22 IMPACTED CERUMEN OF LEFT EAR: ICD-10-CM

## 2023-08-02 DIAGNOSIS — R09.82 PND (POST-NASAL DRIP): Primary | ICD-10-CM

## 2023-08-02 RX ORDER — AMOXICILLIN AND CLAVULANATE POTASSIUM 875; 125 MG/1; MG/1
TABLET, FILM COATED ORAL
COMMUNITY
Start: 2023-07-28

## 2023-08-02 RX ORDER — AZELASTINE 1 MG/ML
2 SPRAY, METERED NASAL 2 TIMES DAILY
Qty: 18 ML | Refills: 11 | Status: SHIPPED | OUTPATIENT
Start: 2023-08-02 | End: 2023-09-01

## 2023-08-02 RX ORDER — PREDNISONE 10 MG/1
TABLET ORAL
COMMUNITY
Start: 2023-07-28

## 2023-08-02 RX ORDER — IPRATROPIUM BROMIDE 42 UG/1
2 SPRAY, METERED NASAL 2 TIMES DAILY
Qty: 15 ML | Refills: 11 | Status: SHIPPED | OUTPATIENT
Start: 2023-08-02 | End: 2023-09-01

## 2023-08-10 ENCOUNTER — TELEPHONE (OUTPATIENT)
Dept: PODIATRY | Facility: CLINIC | Age: 73
End: 2023-08-10
Payer: MEDICARE
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
## 2023-08-25 ENCOUNTER — TELEPHONE (OUTPATIENT)
Dept: PODIATRY | Facility: CLINIC | Age: 73
End: 2023-08-25
Payer: MEDICARE

## 2023-08-25 NOTE — TELEPHONE ENCOUNTER
Patient called because she had received a letter about rescheduling. I told her at this time we will not be rescheduling until the new APRN has a schedule to put patients on.

## 2023-11-07 ENCOUNTER — APPOINTMENT (OUTPATIENT)
Dept: GENERAL RADIOLOGY | Facility: HOSPITAL | Age: 73
End: 2023-11-07
Payer: MEDICARE

## 2023-11-07 PROCEDURE — 73630 X-RAY EXAM OF FOOT: CPT

## 2023-11-08 ENCOUNTER — TELEPHONE (OUTPATIENT)
Dept: PODIATRY | Facility: CLINIC | Age: 73
End: 2023-11-08
Payer: MEDICARE

## 2023-11-13 ENCOUNTER — APPOINTMENT (OUTPATIENT)
Dept: CT IMAGING | Age: 73
End: 2023-11-13
Payer: MEDICARE

## 2023-11-13 ENCOUNTER — HOSPITAL ENCOUNTER (EMERGENCY)
Age: 73
Discharge: HOME | End: 2023-11-13
Payer: MEDICARE

## 2023-11-13 VITALS
BODY MASS INDEX: 34.39 KG/M2 | HEART RATE: 86 BPM | SYSTOLIC BLOOD PRESSURE: 116 MMHG | WEIGHT: 188 LBS | TEMPERATURE: 97.9 F | DIASTOLIC BLOOD PRESSURE: 77 MMHG | OXYGEN SATURATION: 99 % | RESPIRATION RATE: 16 BRPM

## 2023-11-13 DIAGNOSIS — K57.32 DIVERTICULITIS OF COLON: Primary | ICD-10-CM

## 2023-11-13 LAB
ADV 40+41 DNA STL QL NAA+NON-PROBE: NOT DETECTED
ALBUMIN SERPL-MCNC: 3.8 G/DL (ref 3.5–5.2)
ALP SERPL-CCNC: 119 U/L (ref 35–104)
ALT SERPL-CCNC: 11 U/L (ref 5–33)
ANION GAP SERPL CALCULATED.3IONS-SCNC: 9 MMOL/L (ref 7–19)
AST SERPL-CCNC: 22 U/L (ref 5–32)
BASOPHILS # BLD: 0 K/UL (ref 0–0.2)
BASOPHILS NFR BLD: 0.3 % (ref 0–1)
BILIRUB SERPL-MCNC: 0.3 MG/DL (ref 0.2–1.2)
BILIRUB UR QL STRIP: NEGATIVE
BUN SERPL-MCNC: 16 MG/DL (ref 8–23)
C CAYETANENSIS DNA STL QL NAA+NON-PROBE: NOT DETECTED
C COLI+JEJ+UPSA DNA STL QL NAA+NON-PROBE: NOT DETECTED
C DIF TOX TCDA+TCDB STL QL NAA+NON-PROBE: DETECTED
CALCIUM SERPL-MCNC: 9.4 MG/DL (ref 8.8–10.2)
CHLORIDE SERPL-SCNC: 99 MMOL/L (ref 98–111)
CLARITY UR: CLEAR
CO2 SERPL-SCNC: 26 MMOL/L (ref 22–29)
COLOR UR: YELLOW
CREAT SERPL-MCNC: 1.7 MG/DL (ref 0.5–0.9)
CRYPTOSP DNA STL QL NAA+NON-PROBE: NOT DETECTED
E HISTOLYT DNA STL QL NAA+NON-PROBE: NOT DETECTED
EAEC PAA PLAS AGGR+AATA ST NAA+NON-PRB: NOT DETECTED
EC STX1+STX2 GENES STL QL NAA+NON-PROBE: NOT DETECTED
EOSINOPHIL # BLD: 0.1 K/UL (ref 0–0.6)
EOSINOPHIL NFR BLD: 0.6 % (ref 0–5)
EPEC EAE GENE STL QL NAA+NON-PROBE: NOT DETECTED
ERYTHROCYTE [DISTWIDTH] IN BLOOD BY AUTOMATED COUNT: 14.9 % (ref 11.5–14.5)
ETEC LTA+ST1A+ST1B TOX ST NAA+NON-PROBE: NOT DETECTED
G LAMBLIA DNA STL QL NAA+NON-PROBE: NOT DETECTED
GI PATH DNA+RNA PNL STL NAA+NON-PROBE: NOT DETECTED
GLUCOSE SERPL-MCNC: 123 MG/DL (ref 74–109)
GLUCOSE UR STRIP.AUTO-MCNC: NEGATIVE MG/DL
HCT VFR BLD AUTO: 33.6 % (ref 37–47)
HGB BLD-MCNC: 11.1 G/DL (ref 12–16)
HGB UR STRIP.AUTO-MCNC: NEGATIVE MG/L
IMM GRANULOCYTES # BLD: 0 K/UL
KETONES UR STRIP.AUTO-MCNC: NEGATIVE MG/DL
LEUKOCYTE ESTERASE UR QL STRIP.AUTO: NEGATIVE
LIPASE SERPL-CCNC: 51 U/L (ref 13–60)
LYMPHOCYTES # BLD: 1.3 K/UL (ref 1.1–4.5)
LYMPHOCYTES NFR BLD: 11.2 % (ref 20–40)
MCH RBC QN AUTO: 29.6 PG (ref 27–31)
MCHC RBC AUTO-ENTMCNC: 33 G/DL (ref 33–37)
MCV RBC AUTO: 89.6 FL (ref 81–99)
MONOCYTES # BLD: 0.9 K/UL (ref 0–0.9)
MONOCYTES NFR BLD: 7.7 % (ref 0–10)
NEUTROPHILS # BLD: 9.4 K/UL (ref 1.5–7.5)
NEUTS SEG NFR BLD: 79.9 % (ref 50–65)
NITRITE UR QL STRIP.AUTO: NEGATIVE
NOROVIRUS GI+II RNA STL QL NAA+NON-PROBE: NOT DETECTED
P SHIGELLOIDES DNA STL QL NAA+NON-PROBE: NOT DETECTED
PH UR STRIP.AUTO: 5.5 [PH] (ref 5–8)
PLATELET # BLD AUTO: 312 K/UL (ref 130–400)
PMV BLD AUTO: 10.1 FL (ref 9.4–12.3)
POTASSIUM SERPL-SCNC: 3.9 MMOL/L (ref 3.5–5)
PROT SERPL-MCNC: 7.1 G/DL (ref 6.6–8.7)
PROT UR STRIP.AUTO-MCNC: NEGATIVE MG/DL
RBC # BLD AUTO: 3.75 M/UL (ref 4.2–5.4)
RVA RNA STL QL NAA+NON-PROBE: NOT DETECTED
S ENT+BONG DNA STL QL NAA+NON-PROBE: NOT DETECTED
SAPO I+II+IV+V RNA STL QL NAA+NON-PROBE: NOT DETECTED
SHIGELLA SP+EIEC IPAH ST NAA+NON-PROBE: NOT DETECTED
SODIUM SERPL-SCNC: 134 MMOL/L (ref 136–145)
SP GR UR STRIP.AUTO: 1 (ref 1–1.03)
UROBILINOGEN UR STRIP.AUTO-MCNC: 0.2 E.U./DL
V CHOL+PARA+VUL DNA STL QL NAA+NON-PROBE: NOT DETECTED
V CHOLERAE DNA STL QL NAA+NON-PROBE: NOT DETECTED
WBC # BLD AUTO: 11.7 K/UL (ref 4.8–10.8)
Y ENTEROCOL DNA STL QL NAA+NON-PROBE: NOT DETECTED

## 2023-11-13 PROCEDURE — 80053 COMPREHEN METABOLIC PANEL: CPT

## 2023-11-13 PROCEDURE — 99284 EMERGENCY DEPT VISIT MOD MDM: CPT

## 2023-11-13 PROCEDURE — 85025 COMPLETE CBC W/AUTO DIFF WBC: CPT

## 2023-11-13 PROCEDURE — 6370000000 HC RX 637 (ALT 250 FOR IP): Performed by: PHYSICIAN ASSISTANT

## 2023-11-13 PROCEDURE — 36415 COLL VENOUS BLD VENIPUNCTURE: CPT

## 2023-11-13 PROCEDURE — 81003 URINALYSIS AUTO W/O SCOPE: CPT

## 2023-11-13 PROCEDURE — 6360000002 HC RX W HCPCS: Performed by: PHYSICIAN ASSISTANT

## 2023-11-13 PROCEDURE — 96361 HYDRATE IV INFUSION ADD-ON: CPT

## 2023-11-13 PROCEDURE — 87507 IADNA-DNA/RNA PROBE TQ 12-25: CPT

## 2023-11-13 PROCEDURE — 83690 ASSAY OF LIPASE: CPT

## 2023-11-13 PROCEDURE — 74176 CT ABD & PELVIS W/O CONTRAST: CPT

## 2023-11-13 PROCEDURE — 96374 THER/PROPH/DIAG INJ IV PUSH: CPT

## 2023-11-13 PROCEDURE — 2580000003 HC RX 258: Performed by: PHYSICIAN ASSISTANT

## 2023-11-13 RX ORDER — AMOXICILLIN AND CLAVULANATE POTASSIUM 875; 125 MG/1; MG/1
1 TABLET, FILM COATED ORAL 2 TIMES DAILY
Qty: 14 TABLET | Refills: 0 | Status: SHIPPED | OUTPATIENT
Start: 2023-11-13 | End: 2023-11-13

## 2023-11-13 RX ORDER — ONDANSETRON 2 MG/ML
4 INJECTION INTRAMUSCULAR; INTRAVENOUS ONCE
Status: COMPLETED | OUTPATIENT
Start: 2023-11-13 | End: 2023-11-13

## 2023-11-13 RX ORDER — AZELASTINE 1 MG/ML
SPRAY, METERED NASAL
Qty: 120 ML | Refills: 10 | Status: SHIPPED | OUTPATIENT
Start: 2023-11-13

## 2023-11-13 RX ORDER — DICYCLOMINE HYDROCHLORIDE 10 MG/1
10 CAPSULE ORAL 4 TIMES DAILY
Qty: 20 CAPSULE | Refills: 0 | Status: SHIPPED | OUTPATIENT
Start: 2023-11-13 | End: 2023-11-13

## 2023-11-13 RX ORDER — DICYCLOMINE HCL 20 MG
20 TABLET ORAL
Status: DISCONTINUED | OUTPATIENT
Start: 2023-11-13 | End: 2023-11-13 | Stop reason: HOSPADM

## 2023-11-13 RX ORDER — 0.9 % SODIUM CHLORIDE 0.9 %
1000 INTRAVENOUS SOLUTION INTRAVENOUS ONCE
Status: COMPLETED | OUTPATIENT
Start: 2023-11-13 | End: 2023-11-13

## 2023-11-13 RX ORDER — VANCOMYCIN HYDROCHLORIDE 125 MG/1
125 CAPSULE ORAL 4 TIMES DAILY
Qty: 40 CAPSULE | Refills: 0 | Status: SHIPPED | OUTPATIENT
Start: 2023-11-13 | End: 2023-11-23

## 2023-11-13 RX ADMIN — ONDANSETRON 4 MG: 2 INJECTION INTRAMUSCULAR; INTRAVENOUS at 12:00

## 2023-11-13 RX ADMIN — DICYCLOMINE HYDROCHLORIDE 20 MG: 20 TABLET ORAL at 12:03

## 2023-11-13 RX ADMIN — SODIUM CHLORIDE 1000 ML: 9 INJECTION, SOLUTION INTRAVENOUS at 12:00

## 2023-11-13 ASSESSMENT — ENCOUNTER SYMPTOMS
CONSTIPATION: 0
BACK PAIN: 1
COUGH: 0
ABDOMINAL PAIN: 1
DIARRHEA: 1
ABDOMINAL DISTENTION: 0
NAUSEA: 1
BLOOD IN STOOL: 0
VOMITING: 1
SHORTNESS OF BREATH: 0

## 2023-11-13 ASSESSMENT — PAIN SCALES - GENERAL: PAINLEVEL_OUTOF10: 6

## 2023-11-15 ENCOUNTER — HOSPITAL ENCOUNTER (EMERGENCY)
Age: 73
Discharge: HOME | End: 2023-11-15
Attending: EMERGENCY MEDICINE
Payer: MEDICARE

## 2023-11-15 VITALS
DIASTOLIC BLOOD PRESSURE: 81 MMHG | SYSTOLIC BLOOD PRESSURE: 136 MMHG | WEIGHT: 187.39 LBS | OXYGEN SATURATION: 98 % | HEART RATE: 78 BPM | RESPIRATION RATE: 18 BRPM | BODY MASS INDEX: 34.27 KG/M2 | TEMPERATURE: 97.8 F

## 2023-11-15 DIAGNOSIS — K52.9 COLITIS: Primary | ICD-10-CM

## 2023-11-15 LAB
ALBUMIN SERPL-MCNC: 3.6 G/DL (ref 3.5–5.2)
ALP SERPL-CCNC: 101 U/L (ref 35–104)
ALT SERPL-CCNC: 11 U/L (ref 5–33)
ANION GAP SERPL CALCULATED.3IONS-SCNC: 12 MMOL/L (ref 7–19)
AST SERPL-CCNC: 22 U/L (ref 5–32)
BASOPHILS # BLD: 0 K/UL (ref 0–0.2)
BASOPHILS NFR BLD: 0.4 % (ref 0–1)
BILIRUB SERPL-MCNC: 0.3 MG/DL (ref 0.2–1.2)
BUN SERPL-MCNC: 12 MG/DL (ref 8–23)
CALCIUM SERPL-MCNC: 9.1 MG/DL (ref 8.8–10.2)
CHLORIDE SERPL-SCNC: 100 MMOL/L (ref 98–111)
CO2 SERPL-SCNC: 25 MMOL/L (ref 22–29)
CREAT SERPL-MCNC: 1.5 MG/DL (ref 0.5–0.9)
EOSINOPHIL # BLD: 0.1 K/UL (ref 0–0.6)
EOSINOPHIL NFR BLD: 1.1 % (ref 0–5)
ERYTHROCYTE [DISTWIDTH] IN BLOOD BY AUTOMATED COUNT: 15 % (ref 11.5–14.5)
GLUCOSE SERPL-MCNC: 92 MG/DL (ref 74–109)
HCT VFR BLD AUTO: 31.8 % (ref 37–47)
HGB BLD-MCNC: 10.2 G/DL (ref 12–16)
IMM GRANULOCYTES # BLD: 0 K/UL
LYMPHOCYTES # BLD: 1.3 K/UL (ref 1.1–4.5)
LYMPHOCYTES NFR BLD: 13.9 % (ref 20–40)
MCH RBC QN AUTO: 29.4 PG (ref 27–31)
MCHC RBC AUTO-ENTMCNC: 32.1 G/DL (ref 33–37)
MCV RBC AUTO: 91.6 FL (ref 81–99)
MONOCYTES # BLD: 0.7 K/UL (ref 0–0.9)
MONOCYTES NFR BLD: 7.5 % (ref 0–10)
NEUTROPHILS # BLD: 6.9 K/UL (ref 1.5–7.5)
NEUTS SEG NFR BLD: 76.7 % (ref 50–65)
PLATELET # BLD AUTO: 307 K/UL (ref 130–400)
PMV BLD AUTO: 10.3 FL (ref 9.4–12.3)
POTASSIUM SERPL-SCNC: 3.8 MMOL/L (ref 3.5–5)
PROT SERPL-MCNC: 6.7 G/DL (ref 6.6–8.7)
RBC # BLD AUTO: 3.47 M/UL (ref 4.2–5.4)
SODIUM SERPL-SCNC: 137 MMOL/L (ref 136–145)
WBC # BLD AUTO: 9.1 K/UL (ref 4.8–10.8)

## 2023-11-15 PROCEDURE — 85025 COMPLETE CBC W/AUTO DIFF WBC: CPT

## 2023-11-15 PROCEDURE — 99283 EMERGENCY DEPT VISIT LOW MDM: CPT

## 2023-11-15 PROCEDURE — 36415 COLL VENOUS BLD VENIPUNCTURE: CPT

## 2023-11-15 PROCEDURE — 80053 COMPREHEN METABOLIC PANEL: CPT

## 2023-12-01 ASSESSMENT — ENCOUNTER SYMPTOMS
SHORTNESS OF BREATH: 0
ABDOMINAL PAIN: 1
ABDOMINAL DISTENTION: 0

## 2023-12-27 NOTE — PROGRESS NOTES
[facility] - PODIATRY    Today's Date: 01/04/24    Patient Name: Camila Wallace  MRN: [id number]  CSN: [id number]  PCP: Sandra Wells PA  Referring Provider: No ref. provider found    SUBJECTIVE     Chief Complaint   Patient presents with   • Follow-up     Sandra ALBA 03/16/2023 3 MTH FU DIABETIC NAIL CARE-pt states she is here today for diabetic foot/nail care-pt denies pain   • Diabetes     125 mg/dl BG     HPI: Camila Wallace, a 73 y.o.female, comes to clinic as a(n) established patient presenting for diabetic foot exam, complaining of toenail/callus issues, and calluses . Patient has h/o farida bullosa, thyroid disease, GERD, Graves Disease, HLD, HTN, FAUSTO, DM2 . Patient is NIDDM with last stated BG level of 125mg/dl.  She reports she does not check her blood Leukos regularly.  Patient reports reports occasional numbness/tingling in feet. Denies open wounds or sores. States that her toenails are long, thick and discolored.   Denies pain today.  Patient reports pain with walking due to callused area to the left foot.  Relates previous treatment(s) including care by podiatry . Denies any constitutional symptoms. No other pedal complaints at this time.    Past Medical History:   Diagnosis Date   • Allergic rhinitis    • Chronic sinusitis    • Farida bullosa    • Disease of thyroid gland    • Diverticulosis 2013   • GERD (gastroesophageal reflux disease)    • Graves disease    • HLD (hyperlipidemia)    • HTN (hypertension)    • Obstructive sleep apnea    • Type 2 diabetes mellitus    • Vasomotor rhinitis      Past Surgical History:   Procedure Laterality Date   • CHOLECYSTECTOMY     • COLONOSCOPY  10/29/2013    diverticulosis   • COLONOSCOPY N/A 7/10/2018    Procedure: COLONOSCOPY WITH ANESTHESIA;  Surgeon: Donald Menchaca DO;  Location: [facility] ENDOSCOPY;  Service: Gastroenterology   • COLONOSCOPY N/A 6/13/2023    Procedure: COLONOSCOPY WITH ANESTHESIA;  Surgeon: Nikolai  Donald COLES, DO;  Location: [facility] ENDOSCOPY;  Service: Gastroenterology;  Laterality: N/A;  pre; hx polyps  post: polyp. diverticuloisis.   Sandra Wells PA   • FOOT SURGERY     • TUBAL ABDOMINAL LIGATION     • TYMPANIC MEMBRANE REPAIR       Family History   Problem Relation Age of Onset   • Diabetes Mother    • Heart disease Mother    • Hypertension Mother    • Hypertension Father    • Diabetes Brother    • Hypertension Brother    • Hyperlipidemia Brother    • Colon polyps Brother    • Hyperlipidemia Maternal Uncle    • Thyroid disease Maternal Uncle    • Cancer Maternal Grandmother    • Colon polyps Daughter      Social History     Socioeconomic History   • Marital status: Single   Tobacco Use   • Smoking status: Never     Passive exposure: Never   • Smokeless tobacco: Never   Vaping Use   • Vaping Use: Never used   Substance and Sexual Activity   • Alcohol use: Yes     Comment: occasionally   • Drug use: No   • Sexual activity: Yes     Allergies   Allergen Reactions   • Metformin And Related Swelling     Current Outpatient Medications   Medication Sig Dispense Refill   • albuterol sulfate  (90 Base) MCG/ACT inhaler albuterol sulfate HFA 90 mcg/actuation aerosol inhaler   Inhale 2 puffs every 4 hours by inhalation route as needed.     • Alcohol Swabs (B-D SINGLE USE SWABS REGULAR) pads      • alendronate (FOSAMAX) 70 MG tablet Take 1 tablet by mouth Every 7 (Seven) Days.     • allopurinol (ZYLOPRIM) 300 MG tablet      • atorvastatin (LIPITOR) 80 MG tablet Take 1 tablet by mouth Daily.     • azelastine (ASTELIN) 0.1 % nasal spray USE 2 SPRAYS IN EACH NOSTRIL TWICE DAILY AS DIRECTED BY PROVIDER 120 mL 10   • bisacodyl (Dulcolax) 5 MG EC tablet Take 2 each evening for 3 days 6 tablet 0   • Blood Glucose Calibration (True Metrix Level 1) Low solution      • Blood Glucose Monitoring Suppl (True Metrix Meter) w/Device kit      • cetirizine (zyrTEC) 10 MG tablet Take 1 tablet by mouth At Night As Needed  for Allergies (sore throat and cough). 30 tablet 11   • cloNIDine (CATAPRES) 0.1 MG tablet Take 1 tablet by mouth Every 6 (Six) Hours As Needed for High Blood Pressure (for blood pressure >160/110). 30 tablet 0   • Glucose Blood (BLOOD GLUCOSE TEST) strip Test blood sugar daily and prn     • guaifenesin (ROBITUSSIN) 100 MG/5ML liquid Take 10 mL by mouth Every 4 (Four) Hours As Needed for Cough. 236 mL 0   • HYDROcodone-acetaminophen (NORCO)  MG per tablet TAKE 1 TABLET BY MOUTH THREE TIMES A DAY  EFFECTIVE DATE 3 21 19  0   • Lancet Devices (TRUEdraw Lancing Device) misc      • lidocaine-prilocaine (EMLA) 2.5-2.5 % cream Apply  topically to the appropriate area as directed As Needed for Mild Pain .     • metoprolol succinate XL (TOPROL-XL) 100 MG 24 hr tablet Take 1 tablet by mouth Daily.     • mupirocin (BACTROBAN) 2 % ointment Apply 1 application  topically to the appropriate area as directed 3 (Three) Times a Day. To sore on right leg 15 g 0   • olmesartan-hydrochlorothiazide (BENICAR HCT) 40-12.5 MG per tablet      • pantoprazole (PROTONIX) 40 MG EC tablet Take 1 tablet by mouth Daily.     • polyethylene glycol (MiraLax) 17 GM/SCOOP powder Take 2 capfuls (in 2 cups of juice or drink of choice)  3 times daily for 3 days, then 1 capful daily in 1 cup drink of choice 850 g 0   • predniSONE (DELTASONE) 10 MG tablet      • SYNTHROID 100 MCG tablet Take 112 mcg by mouth Daily.     • topiramate (TOPAMAX) 100 MG tablet Take 1 tablet by mouth 2 (Two) Times a Day.     • TRUEplus Lancets 33G misc      • vitamin D (ERGOCALCIFEROL) 44231 UNITS capsule capsule Take 1 capsule by mouth 1 (One) Time Per Week.     • Diclofenac Sodium (VOLTAREN) 1 % gel gel Voltaren Arthritis Pain 1 % topical gel   APPLY 2 GRAMS TO THE AFFECTED AREA(S) BY TOPICAL ROUTE 4 TIMES PER DAY (Patient not taking: Reported on 1/4/2024)     • ipratropium (ATROVENT) 0.06 % nasal spray 2 sprays into the nostril(s) as directed by provider 2 (Two) Times a  Day for 30 days. as directed 15 mL 11     No current facility-administered medications for this visit.     Review of Systems   Constitutional:  Negative for chills and fever.   HENT:  Negative for congestion.    Respiratory:  Negative for shortness of breath.    Cardiovascular:  Positive for leg swelling. Negative for chest pain.   Gastrointestinal:  Negative for constipation, diarrhea, nausea and vomiting.   Musculoskeletal:  Negative for arthralgias and joint swelling.   Skin:  Negative for wound.        calluses   Neurological:  Positive for numbness.       OBJECTIVE     Vitals:    01/04/24 1347   BP: 116/70   Pulse: 91   SpO2: 99%         PHYSICAL EXAM  GEN:   Accompanied by none.     Foot/Ankle Exam    GENERAL  Diabetic foot exam performed    Appearance:  appears stated age  Orientation:  AAOx3  Affect:  appropriate  Gait:  unimpaired  Assistance:  independent  Right shoe gear: casual shoe  Left shoe gear: casual shoe    VASCULAR     Right Foot Vascularity   Dorsalis pedis:  2+  Posterior tibial:  2+  Skin temperature:  warm  Edema grading:  None  CFT:  3  Pedal hair growth:  Present  Varicosities:  none     Left Foot Vascularity   Dorsalis pedis:  2+  Posterior tibial:  2+  Skin temperature:  warm  Edema grading:  None  CFT:  3  Pedal hair growth:  Present  Varicosities:  none     NEUROLOGIC     Right Foot Neurologic   Light touch sensation: diminished  Vibratory sensation: diminished  Hot/Cold sensation: diminished  Protective Sensation using [place]arcisa Monofilament:   Sites intact: 7  Sites tested: 10     Left Foot Neurologic   Light touch sensation: diminished  Vibratory sensation: diminished  Hot/Cold sensation:  diminished  Protective Sensation using [place]arcisa Monofilament:   Sites intact: 7  Sites tested: 10    MUSCULOSKELETAL     Right Foot Musculoskeletal   Ecchymosis:  none  Tenderness:  callous right foot and toenail problem    Arch:  Normal  Hammertoe:  Fifth toe and second toe (2nd toe  is starting override hallux)  Hallux valgus: Yes (Medial bony eminence with abduction of hallux)    Hallux limitus: No       Left Foot Musculoskeletal   Ecchymosis:  none  Tenderness:  callous left foot and toenail problem  Arch:  Normal  Hammertoe:  Fifth toe  Hallux valgus: No    Hallux limitus: Yes (s/p fusion)      MUSCLE STRENGTH     Right Foot Muscle Strength   Foot dorsiflexion:  5  Foot plantar flexion:  5  Foot inversion:  5  Foot eversion:  5     Left Foot Muscle Strength   Foot dorsiflexion:  5  Foot plantar flexion:  5  Foot inversion:  5  Foot eversion:  5    RANGE OF MOTION     Right Foot Range of Motion   Foot and ankle ROM within normal limits       Left Foot Range of Motion   Foot and ankle ROM within normal limits    1st MTP extension: 0  1st MTP flexion: 0    DERMATOLOGIC      Right Foot Dermatologic   Skin  Positive for corn.   Nails  1.  Positive for elongated, onychomycosis and abnormal thickness.  2.  Positive for elongated, onychomycosis and abnormal thickness.  3.  Positive for elongated, onychomycosis and abnormal thickness.  4.  Positive for elongated, onychomycosis and abnormal thickness.  5.  Positive for elongated, onychomycosis and abnormal thickness.     Left Foot Dermatologic   Skin  Positive for corn.   Nails  1.  Positive for elongated, onychomycosis and abnormal thickness.  2.  Positive for elongated, onychomycosis and abnormal thickness.  3.  Positive for elongated, onychomycosis and abnormal thickness.  4.  Positive for elongated, onychomycosis and abnormally thick.  5.  Positive for elongated, onychomycosis and abnormally thick.    Image:       RADIOLOGY/NUCLEAR:  No results found.    LABORATORY/CULTURE RESULTS:      PATHOLOGY RESULTS:       ASSESSMENT/PLAN     Diagnoses and all orders for this visit:    1. Onychomycosis (Primary)    2. Type 2 diabetes mellitus without complication, without long-term current use of insulin    3. Chronic kidney disease, stage 3b    4. Foot  callus    5. Encounter for diabetic foot exam    6. Hallux valgus, right    7. Hammer toes of both feet        Comprehensive lower extremity examination and evaluation was performed.  Discussed findings and treatment plan including risks, benefits, and treatment options with patient in detail. Patient agreed with treatment plan.  Diabetic foot exam performed.   After verbal consent obtained, nail(s) x10 debrided of length and thickness with nail nipper without incidence  After verbal consent obtained, calluses x4 pared utilizing dermal curette and/or scalpel without incidence  Patient may maintain nails and calluses at home utilizing emery board or pumice stone between visits as needed  Reviewed at home diabetic foot care including daily foot checks   Continue diabetic monitoring and control under direction of PCP   Remain conservative with foot deformities   An After Visit Summary was printed and given to the patient at discharge, including (if requested) any available informative/educational handouts regarding diagnosis, treatment, or medications. All questions were answered to patient/family satisfaction. Should symptoms fail to improve or worsen they agree to call or return to clinic or to go to the Emergency Department. Discussed the importance of following up with any needed screening tests/labs/specialist appointments and any requested follow-up recommended by me today. Importance of maintaining follow-up discussed and patient accepts that missed appointments can delay diagnosis and potentially lead to worsening of conditions.  Return in about 3 months (around 4/4/2024) for Schedule Foot Care Clinic., or sooner if acute issues arise.    Lab Frequency Next Occurrence       This document has been electronically signed by TONEY Sarmiento on January 4, 2024 14:54 CST

## 2024-01-03 ENCOUNTER — TELEPHONE (OUTPATIENT)
Dept: PODIATRY | Facility: CLINIC | Age: 74
End: 2024-01-03
Payer: MEDICARE

## 2024-01-04 ENCOUNTER — OFFICE VISIT (OUTPATIENT)
Dept: PODIATRY | Facility: CLINIC | Age: 74
End: 2024-01-04
Payer: MEDICARE

## 2024-01-04 VITALS
BODY MASS INDEX: 33.84 KG/M2 | DIASTOLIC BLOOD PRESSURE: 70 MMHG | SYSTOLIC BLOOD PRESSURE: 116 MMHG | OXYGEN SATURATION: 99 % | HEIGHT: 63 IN | WEIGHT: 191 LBS | HEART RATE: 91 BPM

## 2024-01-04 DIAGNOSIS — N18.32 CHRONIC KIDNEY DISEASE, STAGE 3B: ICD-10-CM

## 2024-01-04 DIAGNOSIS — L84 FOOT CALLUS: ICD-10-CM

## 2024-01-04 DIAGNOSIS — M20.42 HAMMER TOES OF BOTH FEET: ICD-10-CM

## 2024-01-04 DIAGNOSIS — M20.41 HAMMER TOES OF BOTH FEET: ICD-10-CM

## 2024-01-04 DIAGNOSIS — M20.11 HALLUX VALGUS, RIGHT: ICD-10-CM

## 2024-01-04 DIAGNOSIS — E11.9 ENCOUNTER FOR DIABETIC FOOT EXAM: ICD-10-CM

## 2024-01-04 DIAGNOSIS — B35.1 ONYCHOMYCOSIS: Primary | ICD-10-CM

## 2024-01-04 DIAGNOSIS — E11.9 TYPE 2 DIABETES MELLITUS WITHOUT COMPLICATION, WITHOUT LONG-TERM CURRENT USE OF INSULIN: ICD-10-CM

## 2024-01-20 DIAGNOSIS — J32.9 CHRONIC SINUSITIS, UNSPECIFIED LOCATION: ICD-10-CM

## 2024-01-20 DIAGNOSIS — R09.82 PND (POST-NASAL DRIP): ICD-10-CM

## 2024-01-20 DIAGNOSIS — J30.1 ALLERGIC RHINITIS DUE TO POLLEN, UNSPECIFIED SEASONALITY: ICD-10-CM

## 2024-01-22 RX ORDER — IPRATROPIUM BROMIDE 42 UG/1
SPRAY, METERED NASAL
Qty: 75 ML | Refills: 3 | Status: SHIPPED | OUTPATIENT
Start: 2024-01-22

## 2024-04-03 NOTE — PROGRESS NOTES
[facility] - PODIATRY    Today's Date: 04/15/24    Patient Name: Camila Wallace  MRN: [id number]  CSN: [id number]  PCP: Sandra Wells PA  Referring Provider: No ref. provider found    SUBJECTIVE     Chief Complaint   Patient presents with    Follow-up     Sandra Wells PA last seen 03/15/2024. 3 MTH FU DIABETIC patient states her left foot is hurting and causing cramping in the back of her leg.    Diabetes     HPI: Camila Wallace, a 74 y.o.female, comes to clinic as a(n) established patient presenting for diabetic foot exam, complaining of toenail/callus issues, and calluses . Patient has h/o farida bullosa, thyroid disease, GERD, Graves Disease, HLD, HTN, FAUSTO, DM2 . Patient is NIDDM and unsure of last BG level.  She reports she does not check her blood glucose regularly. She reports reports occasional numbness/tingling in feet. Denies open wounds or sores. States that her toenails are long, thick and discolored. Patient reports pain at night in her left foot that runs up to her knee, this pain is decreased when she gets up and moves around. Reports she has also had many issues with her back.  Denies pain today.  Patient reports pain with walking due to callused area to the left foot.  Relates previous treatment(s) including care by podiatry . Denies any constitutional symptoms. No other pedal complaints at this time.    Past Medical History:   Diagnosis Date    Allergic rhinitis     Chronic sinusitis     Farida bullosa     Disease of thyroid gland     Diverticulosis 2013    GERD (gastroesophageal reflux disease)     Graves disease     HLD (hyperlipidemia)     HTN (hypertension)     Obstructive sleep apnea     Type 2 diabetes mellitus     Vasomotor rhinitis      Past Surgical History:   Procedure Laterality Date    CHOLECYSTECTOMY      COLONOSCOPY  10/29/2013    diverticulosis    COLONOSCOPY N/A 7/10/2018    Procedure: COLONOSCOPY WITH ANESTHESIA;  Surgeon: Donald COLES  DO Nikolai;  Location: [facility] ENDOSCOPY;  Service: Gastroenterology    COLONOSCOPY N/A 6/13/2023    Procedure: COLONOSCOPY WITH ANESTHESIA;  Surgeon: Donald Menchaca DO;  Location: [facility] ENDOSCOPY;  Service: Gastroenterology;  Laterality: N/A;  pre; hx polyps  post: polyp. diverticuloisis.   Sandra Wells PA    FOOT SURGERY      TUBAL ABDOMINAL LIGATION      TYMPANIC MEMBRANE REPAIR       Family History   Problem Relation Age of Onset    Diabetes Mother     Heart disease Mother     Hypertension Mother     Hypertension Father     Diabetes Brother     Hypertension Brother     Hyperlipidemia Brother     Colon polyps Brother     Hyperlipidemia Maternal Uncle     Thyroid disease Maternal Uncle     Cancer Maternal Grandmother     Colon polyps Daughter      Social History     Socioeconomic History    Marital status: Single   Tobacco Use    Smoking status: Never     Passive exposure: Never    Smokeless tobacco: Never   Vaping Use    Vaping status: Never Used   Substance and Sexual Activity    Alcohol use: Yes     Comment: occasionally    Drug use: No    Sexual activity: Yes     Allergies   Allergen Reactions    Metformin And Related Swelling     Current Outpatient Medications   Medication Sig Dispense Refill    albuterol sulfate  (90 Base) MCG/ACT inhaler albuterol sulfate HFA 90 mcg/actuation aerosol inhaler   Inhale 2 puffs every 4 hours by inhalation route as needed.      Alcohol Swabs (B-D SINGLE USE SWABS REGULAR) pads       alendronate (FOSAMAX) 70 MG tablet Take 1 tablet by mouth Every 7 (Seven) Days.      allopurinol (ZYLOPRIM) 300 MG tablet Daily.      atorvastatin (LIPITOR) 80 MG tablet Take 1 tablet by mouth Daily.      azelastine (ASTELIN) 0.1 % nasal spray USE 2 SPRAYS IN EACH NOSTRIL TWICE DAILY AS DIRECTED BY PROVIDER 120 mL 10    Blood Glucose Calibration (True Metrix Level 1) Low solution       Blood Glucose Monitoring Suppl (True Metrix Meter) w/Device kit       cetirizine (zyrTEC) 10  MG tablet Take 1 tablet by mouth At Night As Needed for Allergies (sore throat and cough). 30 tablet 11    cloNIDine (CATAPRES) 0.1 MG tablet Take 1 tablet by mouth Every 6 (Six) Hours As Needed for High Blood Pressure (for blood pressure >160/110). 30 tablet 0    Diclofenac Sodium (VOLTAREN) 1 % gel gel       Glucose Blood (BLOOD GLUCOSE TEST) strip Test blood sugar daily and prn      guaifenesin (ROBITUSSIN) 100 MG/5ML liquid Take 10 mL by mouth Every 4 (Four) Hours As Needed for Cough. 236 mL 0    HYDROcodone-acetaminophen (NORCO)  MG per tablet TAKE 1 TABLET BY MOUTH THREE TIMES A DAY  EFFECTIVE DATE 3 21 19  0    ipratropium (ATROVENT) 0.06 % nasal spray USE 2 SPRAYS IN EACH NOSTRIL TWICE DAILY AS DIRECTED BY PROVIDER 75 mL 3    Lancet Devices (TRUEdraw Lancing Device) misc       metoprolol succinate XL (TOPROL-XL) 100 MG 24 hr tablet Take 1 tablet by mouth Daily.      olmesartan-hydrochlorothiazide (BENICAR HCT) 40-12.5 MG per tablet Daily.      pantoprazole (PROTONIX) 40 MG EC tablet Take 1 tablet by mouth Daily.      SYNTHROID 100 MCG tablet Take 112 mcg by mouth Daily.      topiramate (TOPAMAX) 100 MG tablet Take 1 tablet by mouth 2 (Two) Times a Day.      TRUEplus Lancets 33G misc       vitamin D (ERGOCALCIFEROL) 85650 UNITS capsule capsule Take 1 capsule by mouth 1 (One) Time Per Week.       No current facility-administered medications for this visit.     Review of Systems   Constitutional:  Negative for chills and fever.   HENT:  Negative for congestion.    Respiratory:  Negative for shortness of breath.    Cardiovascular:  Positive for leg swelling. Negative for chest pain.   Gastrointestinal:  Negative for constipation, diarrhea, nausea and vomiting.   Musculoskeletal:  Negative for arthralgias and joint swelling.   Skin:  Negative for wound.        calluses   Neurological:  Positive for numbness.       OBJECTIVE     Vitals:    04/15/24 1321   BP: 130/78   Pulse: 80   SpO2: 97%           PHYSICAL  EXAM  GEN:   Accompanied by none.     Foot/Ankle Exam    GENERAL  Diabetic foot exam performed    Appearance:  appears stated age  Orientation:  AAOx3  Affect:  appropriate  Gait:  unimpaired  Assistance:  independent  Right shoe gear: casual shoe  Left shoe gear: casual shoe    VASCULAR     Right Foot Vascularity   Dorsalis pedis:  2+  Posterior tibial:  2+  Skin temperature:  warm  Edema grading:  None  CFT:  3  Pedal hair growth:  Present  Varicosities:  none     Left Foot Vascularity   Dorsalis pedis:  2+  Posterior tibial:  2+  Skin temperature:  warm  Edema grading:  None  CFT:  3  Pedal hair growth:  Present  Varicosities:  none     NEUROLOGIC     Right Foot Neurologic   Light touch sensation: diminished  Vibratory sensation: diminished  Hot/Cold sensation: diminished  Protective Sensation using [place]-Narcisa Monofilament:   Sites intact: 7  Sites tested: 10     Left Foot Neurologic   Light touch sensation: diminished  Vibratory sensation: diminished  Hot/Cold sensation:  diminished  Protective Sensation using [place]-Narcisa Monofilament:   Sites intact: 7  Sites tested: 10    MUSCULOSKELETAL     Right Foot Musculoskeletal   Ecchymosis:  none  Tenderness:  callous right foot and toenail problem    Arch:  Normal  Hammertoe:  Fifth toe and second toe (2nd toe is starting override hallux)  Hallux valgus: Yes (Medial bony eminence with abduction of hallux)    Hallux limitus: No       Left Foot Musculoskeletal   Ecchymosis:  none  Tenderness:  callous left foot and toenail problem  Arch:  Normal  Hammertoe:  Fifth toe  Hallux valgus: No    Hallux limitus: Yes (s/p fusion)      MUSCLE STRENGTH     Right Foot Muscle Strength   Foot dorsiflexion:  5  Foot plantar flexion:  5  Foot inversion:  5  Foot eversion:  5     Left Foot Muscle Strength   Foot dorsiflexion:  5  Foot plantar flexion:  5  Foot inversion:  5  Foot eversion:  5    RANGE OF MOTION     Right Foot Range of Motion   Foot and ankle ROM within  normal limits       Left Foot Range of Motion   Foot and ankle ROM within normal limits    1st MTP extension: 0  1st MTP flexion: 0    DERMATOLOGIC      Right Foot Dermatologic   Skin  Positive for corn.   Nails  1.  Positive for elongated, onychomycosis and abnormal thickness.  2.  Positive for elongated, onychomycosis and abnormal thickness.  3.  Positive for elongated, onychomycosis and abnormal thickness.  4.  Positive for elongated, onychomycosis and abnormal thickness.  5.  Positive for elongated, onychomycosis and abnormal thickness.     Left Foot Dermatologic   Skin  Positive for corn.   Nails  1.  Positive for elongated, onychomycosis and abnormal thickness.  2.  Positive for elongated, onychomycosis and abnormal thickness.  3.  Positive for elongated, onychomycosis and abnormal thickness.  4.  Positive for elongated, onychomycosis and abnormally thick.  5.  Positive for elongated, onychomycosis and abnormally thick.    Image:       RADIOLOGY/NUCLEAR:  No results found.    LABORATORY/CULTURE RESULTS:      PATHOLOGY RESULTS:       ASSESSMENT/PLAN     Diagnoses and all orders for this visit:    1. Onychomycosis (Primary)    2. Type 2 diabetes mellitus without complication, without long-term current use of insulin    3. Chronic kidney disease, stage 3b    4. Foot callus    5. Hallux valgus, right    6. Hammer toes of both feet      Comprehensive lower extremity examination and evaluation was performed.  Discussed findings and treatment plan including risks, benefits, and treatment options with patient in detail. Patient agreed with treatment plan.  After verbal consent obtained, nail(s) x10 debrided of length and thickness with nail nipper without incidence  After verbal consent obtained, calluses x4 pared utilizing dermal curette and/or scalpel without incidence  Patient may maintain nails and calluses at home utilizing emery board or pumice stone between visits as needed  Reviewed at home diabetic foot care  including daily foot checks   Continue diabetic monitoring and control under direction of PCP   Remain conservative with foot deformities   An After Visit Summary was printed and given to the patient at discharge, including (if requested) any available informative/educational handouts regarding diagnosis, treatment, or medications. All questions were answered to patient/family satisfaction. Should symptoms fail to improve or worsen they agree to call or return to clinic or to go to the Emergency Department. Discussed the importance of following up with any needed screening tests/labs/specialist appointments and any requested follow-up recommended by me today. Importance of maintaining follow-up discussed and patient accepts that missed appointments can delay diagnosis and potentially lead to worsening of conditions.  Return in about 3 months (around 7/15/2024) for Schedule Foot Care Clinic, Follow-up with Podiatry APRN., or sooner if acute issues arise.    Lab Frequency Next Occurrence       This document has been electronically signed by TONEY Sarmiento on April 15, 2024 15:44 CDT

## 2024-04-12 ENCOUNTER — TELEPHONE (OUTPATIENT)
Dept: PODIATRY | Facility: CLINIC | Age: 74
End: 2024-04-12
Payer: MEDICARE

## 2024-04-12 NOTE — TELEPHONE ENCOUNTER
Called patient regarding appt on 04/15/2024. Left message for patient to return call if any questions or concerns arise.

## 2024-04-15 ENCOUNTER — OFFICE VISIT (OUTPATIENT)
Dept: PODIATRY | Facility: CLINIC | Age: 74
End: 2024-04-15
Payer: MEDICARE

## 2024-04-15 VITALS
WEIGHT: 191 LBS | OXYGEN SATURATION: 97 % | BODY MASS INDEX: 33.83 KG/M2 | DIASTOLIC BLOOD PRESSURE: 78 MMHG | SYSTOLIC BLOOD PRESSURE: 130 MMHG | HEART RATE: 80 BPM

## 2024-04-15 DIAGNOSIS — M20.41 HAMMER TOES OF BOTH FEET: ICD-10-CM

## 2024-04-15 DIAGNOSIS — M20.11 HALLUX VALGUS, RIGHT: ICD-10-CM

## 2024-04-15 DIAGNOSIS — N18.32 CHRONIC KIDNEY DISEASE, STAGE 3B: ICD-10-CM

## 2024-04-15 DIAGNOSIS — B35.1 ONYCHOMYCOSIS: Primary | ICD-10-CM

## 2024-04-15 DIAGNOSIS — M20.42 HAMMER TOES OF BOTH FEET: ICD-10-CM

## 2024-04-15 DIAGNOSIS — L84 FOOT CALLUS: ICD-10-CM

## 2024-04-15 DIAGNOSIS — E11.9 TYPE 2 DIABETES MELLITUS WITHOUT COMPLICATION, WITHOUT LONG-TERM CURRENT USE OF INSULIN: ICD-10-CM

## 2024-04-15 PROCEDURE — 11056 PARNG/CUTG B9 HYPRKR LES 2-4: CPT | Performed by: NURSE PRACTITIONER

## 2024-04-15 PROCEDURE — 1159F MED LIST DOCD IN RCRD: CPT | Performed by: NURSE PRACTITIONER

## 2024-04-15 PROCEDURE — 1160F RVW MEDS BY RX/DR IN RCRD: CPT | Performed by: NURSE PRACTITIONER

## 2024-04-15 PROCEDURE — 11721 DEBRIDE NAIL 6 OR MORE: CPT | Performed by: NURSE PRACTITIONER

## 2024-05-24 PROBLEM — B37.31 VAGINAL YEAST INFECTION: Status: ACTIVE | Noted: 2024-05-24

## 2024-05-24 PROBLEM — N39.0 ACUTE UTI: Status: ACTIVE | Noted: 2024-05-24

## 2024-05-24 PROCEDURE — 87086 URINE CULTURE/COLONY COUNT: CPT | Performed by: NURSE PRACTITIONER

## 2024-05-26 ENCOUNTER — APPOINTMENT (OUTPATIENT)
Dept: GENERAL RADIOLOGY | Facility: HOSPITAL | Age: 74
End: 2024-05-26
Payer: MEDICARE

## 2024-05-26 ENCOUNTER — APPOINTMENT (OUTPATIENT)
Dept: CT IMAGING | Facility: HOSPITAL | Age: 74
End: 2024-05-26
Payer: MEDICARE

## 2024-05-26 ENCOUNTER — HOSPITAL ENCOUNTER (EMERGENCY)
Facility: HOSPITAL | Age: 74
Discharge: HOME | End: 2024-05-26
Admitting: EMERGENCY MEDICINE
Payer: MEDICARE

## 2024-05-26 VITALS
RESPIRATION RATE: 16 BRPM | HEIGHT: 63 IN | DIASTOLIC BLOOD PRESSURE: 72 MMHG | OXYGEN SATURATION: 99 % | BODY MASS INDEX: 32.78 KG/M2 | HEART RATE: 82 BPM | WEIGHT: 185 LBS | SYSTOLIC BLOOD PRESSURE: 154 MMHG | TEMPERATURE: 98.5 F

## 2024-05-26 DIAGNOSIS — S16.1XXA STRAIN OF NECK MUSCLE, INITIAL ENCOUNTER: ICD-10-CM

## 2024-05-26 DIAGNOSIS — R31.9 HEMATURIA, UNSPECIFIED TYPE: Primary | ICD-10-CM

## 2024-05-26 LAB
ALBUMIN SERPL-MCNC: 4.1 G/DL (ref 3.5–5.2)
ALBUMIN/GLOB SERPL: 1.1 G/DL
ALP SERPL-CCNC: 139 U/L (ref 39–117)
ALT SERPL W P-5'-P-CCNC: 10 U/L (ref 1–33)
ANION GAP SERPL CALCULATED.3IONS-SCNC: 12 MMOL/L (ref 5–15)
AST SERPL-CCNC: 18 U/L (ref 1–32)
BACTERIA UR QL AUTO: ABNORMAL /HPF
BASOPHILS # BLD AUTO: 0.05 10*3/MM3 (ref 0–0.2)
BASOPHILS NFR BLD AUTO: 0.5 % (ref 0–1.5)
BILIRUB SERPL-MCNC: 0.5 MG/DL (ref 0–1.2)
BILIRUB UR QL STRIP: NEGATIVE
BUN SERPL-MCNC: 17 MG/DL (ref 8–23)
BUN/CREAT SERPL: 12 (ref 7–25)
CALCIUM SPEC-SCNC: 9.4 MG/DL (ref 8.6–10.5)
CHLORIDE SERPL-SCNC: 100 MMOL/L (ref 98–107)
CLARITY UR: ABNORMAL
CO2 SERPL-SCNC: 26 MMOL/L (ref 22–29)
COLOR UR: YELLOW
CREAT SERPL-MCNC: 1.42 MG/DL (ref 0.57–1)
DEPRECATED RDW RBC AUTO: 48.3 FL (ref 37–54)
EGFRCR SERPLBLD CKD-EPI 2021: 38.9 ML/MIN/1.73
EOSINOPHIL # BLD AUTO: 0.09 10*3/MM3 (ref 0–0.4)
EOSINOPHIL NFR BLD AUTO: 0.8 % (ref 0.3–6.2)
ERYTHROCYTE [DISTWIDTH] IN BLOOD BY AUTOMATED COUNT: 15.2 % (ref 12.3–15.4)
GLOBULIN UR ELPH-MCNC: 3.9 GM/DL
GLUCOSE SERPL-MCNC: 111 MG/DL (ref 65–99)
GLUCOSE UR STRIP-MCNC: NEGATIVE MG/DL
HCT VFR BLD AUTO: 34.7 % (ref 34–46.6)
HGB BLD-MCNC: 11.3 G/DL (ref 12–15.9)
HGB UR QL STRIP.AUTO: ABNORMAL
HYALINE CASTS UR QL AUTO: ABNORMAL /LPF
IMM GRANULOCYTES # BLD AUTO: 0.02 10*3/MM3 (ref 0–0.05)
IMM GRANULOCYTES NFR BLD AUTO: 0.2 % (ref 0–0.5)
KETONES UR QL STRIP: NEGATIVE
LEUKOCYTE ESTERASE UR QL STRIP.AUTO: ABNORMAL
LIPASE SERPL-CCNC: 68 U/L (ref 13–60)
LYMPHOCYTES # BLD AUTO: 1.66 10*3/MM3 (ref 0.7–3.1)
LYMPHOCYTES NFR BLD AUTO: 15.4 % (ref 19.6–45.3)
MCH RBC QN AUTO: 28.3 PG (ref 26.6–33)
MCHC RBC AUTO-ENTMCNC: 32.6 G/DL (ref 31.5–35.7)
MCV RBC AUTO: 87 FL (ref 79–97)
MONOCYTES # BLD AUTO: 0.82 10*3/MM3 (ref 0.1–0.9)
MONOCYTES NFR BLD AUTO: 7.6 % (ref 5–12)
NEUTROPHILS NFR BLD AUTO: 75.5 % (ref 42.7–76)
NEUTROPHILS NFR BLD AUTO: 8.14 10*3/MM3 (ref 1.7–7)
NITRITE UR QL STRIP: NEGATIVE
NRBC BLD AUTO-RTO: 0 /100 WBC (ref 0–0.2)
PH UR STRIP.AUTO: 5.5 [PH] (ref 5–8)
PLATELET # BLD AUTO: 323 10*3/MM3 (ref 140–450)
PMV BLD AUTO: 11 FL (ref 6–12)
POTASSIUM SERPL-SCNC: 4.1 MMOL/L (ref 3.5–5.2)
PROT SERPL-MCNC: 8 G/DL (ref 6–8.5)
PROT UR QL STRIP: ABNORMAL
RBC # BLD AUTO: 3.99 10*6/MM3 (ref 3.77–5.28)
RBC # UR STRIP: ABNORMAL /HPF
REF LAB TEST METHOD: ABNORMAL
SODIUM SERPL-SCNC: 138 MMOL/L (ref 136–145)
SP GR UR STRIP: 1.01 (ref 1–1.03)
SQUAMOUS #/AREA URNS HPF: ABNORMAL /HPF
TRANS CELLS #/AREA URNS HPF: ABNORMAL /HPF
UROBILINOGEN UR QL STRIP: ABNORMAL
WBC # UR STRIP: ABNORMAL /HPF
WBC NRBC COR # BLD AUTO: 10.78 10*3/MM3 (ref 3.4–10.8)

## 2024-05-26 PROCEDURE — 96365 THER/PROPH/DIAG IV INF INIT: CPT

## 2024-05-26 PROCEDURE — 99284 EMERGENCY DEPT VISIT MOD MDM: CPT

## 2024-05-26 PROCEDURE — 72050 X-RAY EXAM NECK SPINE 4/5VWS: CPT

## 2024-05-26 PROCEDURE — 25010000002 CEFTRIAXONE PER 250 MG: Performed by: NURSE PRACTITIONER

## 2024-05-26 PROCEDURE — 85025 COMPLETE CBC W/AUTO DIFF WBC: CPT | Performed by: NURSE PRACTITIONER

## 2024-05-26 PROCEDURE — 74176 CT ABD & PELVIS W/O CONTRAST: CPT

## 2024-05-26 PROCEDURE — 80053 COMPREHEN METABOLIC PANEL: CPT | Performed by: NURSE PRACTITIONER

## 2024-05-26 PROCEDURE — 83690 ASSAY OF LIPASE: CPT | Performed by: NURSE PRACTITIONER

## 2024-05-26 PROCEDURE — 81001 URINALYSIS AUTO W/SCOPE: CPT | Performed by: NURSE PRACTITIONER

## 2024-05-26 RX ORDER — SODIUM CHLORIDE 0.9 % (FLUSH) 0.9 %
10 SYRINGE (ML) INJECTION AS NEEDED
Status: DISCONTINUED | OUTPATIENT
Start: 2024-05-26 | End: 2024-05-27 | Stop reason: HOSPADM

## 2024-05-26 RX ADMIN — CEFTRIAXONE SODIUM 2000 MG: 2 INJECTION, POWDER, FOR SOLUTION INTRAMUSCULAR; INTRAVENOUS at 20:26

## 2024-05-27 NOTE — DISCHARGE INSTRUCTIONS
F/u with pcp for re-evaluation for possible referral to urology regarding blood in urine    Your urine culture was negative for growth therefore you may not have a urinary infection that is causing the hematuria
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
## 2024-07-11 NOTE — PROGRESS NOTES
[facility] - PODIATRY    Today's Date: 07/15/24    Patient Name: Camila Wallace  MRN: [id number]  CSN: [id number]  PCP: Sandra Wells PA  Referring Provider: No ref. provider found    SUBJECTIVE     Chief Complaint   Patient presents with    Follow-up     Sandra Wells PA-03/27/2024-3 MTH FU DIABETIC-pt states she is here today for diabetic nail/foot care-pt reports pain level 7/10    Diabetes     HPI: Camila Wallace, a 74 y.o.female, comes to clinic as a(n) established patient presenting for diabetic foot exam, complaining of toenail/callus issues, and calluses . Patient has h/o farida bullosa, thyroid disease, GERD, Graves Disease, HLD, HTN, FAUSTO, DM2 . Patient is NIDDM and unsure of last BG level.  She reports she does not check her blood glucose regularly. She reports reports occasional numbness/tingling in feet. Denies open wounds or sores. States that her toenails are long, thick and discolored.  Patient reports she has been caring for her calluses and corns at home when they become painful.  Admits pain at 7/10 level, described as aching, and centered around foot callus  today.  Patient reports pain with walking due to callused area to the left foot.  Relates previous treatment(s) including care by podiatry . Denies any constitutional symptoms. No other pedal complaints at this time.    Past Medical History:   Diagnosis Date    Allergic rhinitis     Chronic sinusitis     Farida bullosa     Disease of thyroid gland     Diverticulosis 2013    GERD (gastroesophageal reflux disease)     Graves disease     HLD (hyperlipidemia)     HTN (hypertension)     Obstructive sleep apnea     Type 2 diabetes mellitus     Vasomotor rhinitis      Past Surgical History:   Procedure Laterality Date    CHOLECYSTECTOMY      COLONOSCOPY  10/29/2013    diverticulosis    COLONOSCOPY N/A 7/10/2018    Procedure: COLONOSCOPY WITH ANESTHESIA;  Surgeon: Donald Menchaca DO;  Location: [facility]  ENDOSCOPY;  Service: Gastroenterology    COLONOSCOPY N/A 6/13/2023    Procedure: COLONOSCOPY WITH ANESTHESIA;  Surgeon: Donald Menchaca DO;  Location: [facility] ENDOSCOPY;  Service: Gastroenterology;  Laterality: N/A;  pre; hx polyps  post: polyp. diverticuloisis.   Sandra Wells PA    FOOT SURGERY      TUBAL ABDOMINAL LIGATION      TYMPANIC MEMBRANE REPAIR       Family History   Problem Relation Age of Onset    Diabetes Mother     Heart disease Mother     Hypertension Mother     Hypertension Father     Diabetes Brother     Hypertension Brother     Hyperlipidemia Brother     Colon polyps Brother     Hyperlipidemia Maternal Uncle     Thyroid disease Maternal Uncle     Cancer Maternal Grandmother     Colon polyps Daughter      Social History     Socioeconomic History    Marital status: Single   Tobacco Use    Smoking status: Never     Passive exposure: Never    Smokeless tobacco: Never   Vaping Use    Vaping status: Never Used   Substance and Sexual Activity    Alcohol use: Yes     Comment: occasionally    Drug use: No    Sexual activity: Yes     Allergies   Allergen Reactions    Metformin And Related Swelling     Current Outpatient Medications   Medication Sig Dispense Refill    albuterol sulfate  (90 Base) MCG/ACT inhaler albuterol sulfate HFA 90 mcg/actuation aerosol inhaler   Inhale 2 puffs every 4 hours by inhalation route as needed.      Alcohol Swabs (B-D SINGLE USE SWABS REGULAR) pads       alendronate (FOSAMAX) 70 MG tablet Take 1 tablet by mouth Every 7 (Seven) Days.      allopurinol (ZYLOPRIM) 300 MG tablet Daily.      atorvastatin (LIPITOR) 80 MG tablet Take 1 tablet by mouth Daily.      azelastine (ASTELIN) 0.1 % nasal spray USE 2 SPRAYS IN EACH NOSTRIL TWICE DAILY AS DIRECTED BY PROVIDER 120 mL 10    Blood Glucose Calibration (True Metrix Level 1) Low solution       Blood Glucose Monitoring Suppl (True Metrix Meter) w/Device kit       cetirizine (zyrTEC) 10 MG tablet Take 1 tablet by  mouth At Night As Needed for Allergies (sore throat and cough). 30 tablet 11    cloNIDine (CATAPRES) 0.1 MG tablet Take 1 tablet by mouth Every 6 (Six) Hours As Needed for High Blood Pressure (for blood pressure >160/110). 30 tablet 0    Diclofenac Sodium (VOLTAREN) 1 % gel gel       Glucose Blood (BLOOD GLUCOSE TEST) strip Test blood sugar daily and prn      guaifenesin (ROBITUSSIN) 100 MG/5ML liquid Take 10 mL by mouth Every 4 (Four) Hours As Needed for Cough. 236 mL 0    HYDROcodone-acetaminophen (NORCO)  MG per tablet TAKE 1 TABLET BY MOUTH THREE TIMES A DAY  EFFECTIVE DATE 3 21 19  0    ipratropium (ATROVENT) 0.06 % nasal spray USE 2 SPRAYS IN EACH NOSTRIL TWICE DAILY AS DIRECTED BY PROVIDER 75 mL 3    Lancet Devices (TRUEdraw Lancing Device) misc       metoprolol succinate XL (TOPROL-XL) 100 MG 24 hr tablet Take 1 tablet by mouth Daily.      nystatin (MYCOSTATIN) 860325 UNIT/GM ointment Apply 1 Application topically to the appropriate area as directed 2 (Two) Times a Day. 30 g 0    olmesartan-hydrochlorothiazide (BENICAR HCT) 40-12.5 MG per tablet Daily.      pantoprazole (PROTONIX) 40 MG EC tablet Take 1 tablet by mouth Daily.      SYNTHROID 100 MCG tablet Take 80 mcg by mouth Daily.      topiramate (TOPAMAX) 100 MG tablet Take 1 tablet by mouth 2 (Two) Times a Day.      TRUEplus Lancets 33G misc       vitamin D (ERGOCALCIFEROL) 63526 UNITS capsule capsule Take 1 capsule by mouth 1 (One) Time Per Week.       No current facility-administered medications for this visit.     Review of Systems   Constitutional:  Negative for chills and fever.   HENT:  Negative for congestion.    Respiratory:  Negative for shortness of breath.    Cardiovascular:  Positive for leg swelling. Negative for chest pain.   Gastrointestinal:  Negative for constipation, diarrhea, nausea and vomiting.   Musculoskeletal:  Negative for arthralgias and joint swelling.   Skin:  Negative for wound.        calluses   Neurological:  Positive  for numbness.       OBJECTIVE     Vitals:    07/15/24 0929   BP: 120/72         PHYSICAL EXAM  GEN:   Accompanied by none.     Foot/Ankle Exam    GENERAL  Diabetic foot exam performed    Appearance:  appears stated age  Orientation:  AAOx3  Affect:  appropriate  Gait:  unimpaired  Assistance:  independent  Right shoe gear: casual shoe  Left shoe gear: casual shoe    VASCULAR     Right Foot Vascularity   Dorsalis pedis:  2+  Posterior tibial:  2+  Skin temperature:  warm  Edema grading:  None  CFT:  3  Pedal hair growth:  Present  Varicosities:  none     Left Foot Vascularity   Dorsalis pedis:  2+  Posterior tibial:  2+  Skin temperature:  warm  Edema grading:  None  CFT:  3  Pedal hair growth:  Present  Varicosities:  none     NEUROLOGIC     Right Foot Neurologic   Light touch sensation: diminished  Vibratory sensation: diminished  Hot/Cold sensation: diminished  Protective Sensation using [place]-Narcisa Monofilament:   Sites intact: 7  Sites tested: 10     Left Foot Neurologic   Light touch sensation: diminished  Vibratory sensation: diminished  Hot/Cold sensation:  diminished  Protective Sensation using [place]-Narcisa Monofilament:   Sites intact: 7  Sites tested: 10    MUSCULOSKELETAL     Right Foot Musculoskeletal   Ecchymosis:  none  Tenderness:  callous right foot and toenail problem    Arch:  Normal  Hammertoe:  Fifth toe and second toe (2nd toe is starting override hallux)  Hallux valgus: Yes (Medial bony eminence with abduction of hallux)    Hallux limitus: No       Left Foot Musculoskeletal   Ecchymosis:  none  Tenderness:  callous left foot and toenail problem  Arch:  Normal  Hammertoe:  Fifth toe  Hallux valgus: No    Hallux limitus: Yes (s/p fusion)      MUSCLE STRENGTH     Right Foot Muscle Strength   Foot dorsiflexion:  5  Foot plantar flexion:  5  Foot inversion:  5  Foot eversion:  5     Left Foot Muscle Strength   Foot dorsiflexion:  5  Foot plantar flexion:  5  Foot inversion:  5  Foot  eversion:  5    RANGE OF MOTION     Right Foot Range of Motion   Foot and ankle ROM within normal limits       Left Foot Range of Motion   Foot and ankle ROM within normal limits    1st MTP extension: 0  1st MTP flexion: 0    DERMATOLOGIC      Right Foot Dermatologic   Skin  Positive for corn.   Nails  1.  Positive for elongated, onychomycosis and abnormal thickness.  2.  Positive for elongated, onychomycosis and abnormal thickness.  3.  Positive for elongated, onychomycosis and abnormal thickness.  4.  Positive for elongated, onychomycosis and abnormal thickness.  5.  Positive for elongated, onychomycosis and abnormal thickness.     Left Foot Dermatologic   Skin  Positive for corn.   Nails  1.  Positive for elongated, onychomycosis and abnormal thickness.  2.  Positive for elongated, onychomycosis and abnormal thickness.  3.  Positive for elongated, onychomycosis and abnormal thickness.  4.  Positive for elongated, onychomycosis and abnormally thick.  5.  Positive for elongated, onychomycosis and abnormally thick.    Image:       RADIOLOGY/NUCLEAR:  No results found.    LABORATORY/CULTURE RESULTS:      PATHOLOGY RESULTS:       ASSESSMENT/PLAN     Diagnoses and all orders for this visit:    1. Onychomycosis (Primary)    2. Type 2 diabetes mellitus without complication, without long-term current use of insulin    3. Chronic kidney disease, stage 3b    4. Foot callus    5. Hallux valgus, right    6. Hammer toes of both feet        Comprehensive lower extremity examination and evaluation was performed.  Discussed findings and treatment plan including risks, benefits, and treatment options with patient in detail. Patient agreed with treatment plan.  After verbal consent obtained, nail(s) x10 debrided of length and thickness with nail nipper without incidence  After verbal consent obtained, calluses x3 pared utilizing dermal curette and/or scalpel without incidence  Patient may maintain nails and calluses at home  utilizing emery board or pumice stone between visits as needed  Reviewed at home diabetic foot care including daily foot checks   Continue diabetic monitoring and control under direction of PCP   Remain conservative with foot deformities   An After Visit Summary was printed and given to the patient at discharge, including (if requested) any available informative/educational handouts regarding diagnosis, treatment, or medications. All questions were answered to patient/family satisfaction. Should symptoms fail to improve or worsen they agree to call or return to clinic or to go to the Emergency Department. Discussed the importance of following up with any needed screening tests/labs/specialist appointments and any requested follow-up recommended by me today. Importance of maintaining follow-up discussed and patient accepts that missed appointments can delay diagnosis and potentially lead to worsening of conditions.  Return in about 3 months (around 10/15/2024) for With Alee ZIEGLER, Schedule [facility]., or sooner if acute issues arise.    Lab Frequency Next Occurrence       This document has been electronically signed by TONEY Sarmiento on July 15, 2024 12:05 CDT

## 2024-07-12 ENCOUNTER — TELEPHONE (OUTPATIENT)
Age: 74
End: 2024-07-12
Payer: MEDICARE

## 2024-07-12 NOTE — TELEPHONE ENCOUNTER
Called patient to confirmed appointment for 07/15 @ 8553 with Alee ZIEGLER. Patient will be here for appointment.

## 2024-07-15 ENCOUNTER — OFFICE VISIT (OUTPATIENT)
Age: 74
End: 2024-07-15
Payer: MEDICARE

## 2024-07-15 VITALS
WEIGHT: 182 LBS | DIASTOLIC BLOOD PRESSURE: 72 MMHG | SYSTOLIC BLOOD PRESSURE: 120 MMHG | BODY MASS INDEX: 32.25 KG/M2 | HEIGHT: 63 IN

## 2024-07-15 DIAGNOSIS — M20.41 HAMMER TOES OF BOTH FEET: ICD-10-CM

## 2024-07-15 DIAGNOSIS — B35.1 ONYCHOMYCOSIS: Primary | ICD-10-CM

## 2024-07-15 DIAGNOSIS — M20.42 HAMMER TOES OF BOTH FEET: ICD-10-CM

## 2024-07-15 DIAGNOSIS — L84 FOOT CALLUS: ICD-10-CM

## 2024-07-15 DIAGNOSIS — N18.32 CHRONIC KIDNEY DISEASE, STAGE 3B: ICD-10-CM

## 2024-07-15 DIAGNOSIS — M20.11 HALLUX VALGUS, RIGHT: ICD-10-CM

## 2024-07-15 DIAGNOSIS — E11.9 TYPE 2 DIABETES MELLITUS WITHOUT COMPLICATION, WITHOUT LONG-TERM CURRENT USE OF INSULIN: ICD-10-CM

## 2024-07-15 PROCEDURE — 11056 PARNG/CUTG B9 HYPRKR LES 2-4: CPT | Performed by: NURSE PRACTITIONER

## 2024-07-15 PROCEDURE — 1160F RVW MEDS BY RX/DR IN RCRD: CPT | Performed by: NURSE PRACTITIONER

## 2024-07-15 PROCEDURE — 11721 DEBRIDE NAIL 6 OR MORE: CPT | Performed by: NURSE PRACTITIONER

## 2024-07-15 PROCEDURE — 1159F MED LIST DOCD IN RCRD: CPT | Performed by: NURSE PRACTITIONER

## 2024-07-17 ENCOUNTER — OFFICE VISIT (OUTPATIENT)
Dept: OBSTETRICS AND GYNECOLOGY | Age: 74
End: 2024-07-17
Payer: MEDICARE

## 2024-07-17 VITALS
HEIGHT: 63 IN | WEIGHT: 178 LBS | DIASTOLIC BLOOD PRESSURE: 78 MMHG | SYSTOLIC BLOOD PRESSURE: 124 MMHG | BODY MASS INDEX: 31.54 KG/M2

## 2024-07-17 DIAGNOSIS — N89.8 VAGINAL DISCHARGE: ICD-10-CM

## 2024-07-17 DIAGNOSIS — Z01.419 ENCOUNTER FOR GYNECOLOGICAL EXAMINATION WITHOUT ABNORMAL FINDING: Primary | ICD-10-CM

## 2024-07-17 PROCEDURE — G0101 CA SCREEN;PELVIC/BREAST EXAM: HCPCS | Performed by: NURSE PRACTITIONER

## 2024-07-17 PROCEDURE — 1160F RVW MEDS BY RX/DR IN RCRD: CPT | Performed by: NURSE PRACTITIONER

## 2024-07-17 PROCEDURE — 1159F MED LIST DOCD IN RCRD: CPT | Performed by: NURSE PRACTITIONER

## 2024-07-17 NOTE — PROGRESS NOTES
"Chief Complaint  Establish Care (New patient here to establish care with our office, referred by Sandra Wells. /[facility]  /Last mammogram 7/14/23([facility])-Negative(upcoming appointment)  /Pt c/o frequent UTI's and given several rounds of antibiotics and now having some vaginal discharge.  )  History of Present Illness  The patient presents for evaluation of vaginal discharge.    The patient denies experiencing any vaginal pain or vaginal bleeding.   She acknowledges being sexually active.   She has previously observed hematuria during urinary tract infections, however, this has not occurred recently.   She has not undergone a gynecological examination in the past year.    Subjective          Camila Wallace presents to [facility]  History of Present Illness    Review of Systems   Constitutional:  Negative for activity change, appetite change, fatigue and fever.   HENT:  Negative for congestion, sore throat and trouble swallowing.    Eyes:  Negative for pain, discharge and visual disturbance.   Respiratory:  Negative for apnea, shortness of breath and wheezing.    Cardiovascular:  Negative for chest pain, palpitations and leg swelling.   Gastrointestinal:  Negative for abdominal pain, constipation and diarrhea.   Genitourinary:  Positive for vaginal discharge. Negative for frequency, pelvic pain and urgency.        Frequent UTIs   Musculoskeletal:  Negative for back pain and gait problem.   Skin:  Negative for color change and rash.   Neurological:  Negative for dizziness, weakness and numbness.   Psychiatric/Behavioral:  Negative for confusion and sleep disturbance.         Objective   Vital Signs:   /78   Ht 160 cm (63\")   Wt 80.7 kg (178 lb)   BMI 31.53 kg/m²     Physical Exam  Vitals and nursing note reviewed. Exam conducted with a chaperone present.   Constitutional:       General: She is not in acute distress.     Appearance: She is well-developed. She " is not diaphoretic.   HENT:      Head: Normocephalic.      Right Ear: External ear normal.      Left Ear: External ear normal.      Nose: Nose normal.   Eyes:      General: No scleral icterus.        Right eye: No discharge.         Left eye: No discharge.      Conjunctiva/sclera: Conjunctivae normal.      Pupils: Pupils are equal, round, and reactive to light.   Neck:      Thyroid: No thyromegaly.      Vascular: No carotid bruit.      Trachea: No tracheal deviation.   Cardiovascular:      Rate and Rhythm: Normal rate and regular rhythm.      Heart sounds: Normal heart sounds. No murmur heard.  Pulmonary:      Effort: Pulmonary effort is normal. No respiratory distress.      Breath sounds: Normal breath sounds. No wheezing.   Chest:   Breasts:     Breasts are symmetrical.      Right: Normal. No swelling, bleeding, inverted nipple, mass, nipple discharge, skin change or tenderness.      Left: Normal. No swelling, bleeding, inverted nipple, mass, nipple discharge, skin change or tenderness.   Abdominal:      General: There is no distension.      Palpations: Abdomen is soft. There is no mass.      Tenderness: There is no abdominal tenderness. There is no right CVA tenderness, left CVA tenderness or guarding.      Hernia: No hernia is present. There is no hernia in the left inguinal area or right inguinal area.   Genitourinary:     General: Normal vulva.      Exam position: Lithotomy position.      Labia:         Right: No rash, tenderness, lesion or injury.         Left: No rash, tenderness, lesion or injury.       Vagina: Normal. No signs of injury and foreign body. No vaginal discharge, erythema, tenderness or bleeding.      Cervix: Normal.      Uterus: Normal. Not enlarged, not fixed and not tender.       Adnexa: Right adnexa normal and left adnexa normal.        Right: No mass, tenderness or fullness.          Left: No mass, tenderness or fullness.        Rectum: Normal. No mass.      Comments:   BSU  normal  Urethral meatus  Normal  Perineum  Normal  Musculoskeletal:         General: No tenderness. Normal range of motion.      Cervical back: Normal range of motion and neck supple.   Lymphadenopathy:      Head:      Right side of head: No submental, submandibular, tonsillar, preauricular, posterior auricular or occipital adenopathy.      Left side of head: No submental, submandibular, tonsillar, preauricular, posterior auricular or occipital adenopathy.      Cervical: No cervical adenopathy.      Right cervical: No superficial, deep or posterior cervical adenopathy.     Left cervical: No superficial, deep or posterior cervical adenopathy.      Upper Body:      Right upper body: No supraclavicular, axillary or pectoral adenopathy.      Left upper body: No supraclavicular, axillary or pectoral adenopathy.      Lower Body: No right inguinal adenopathy. No left inguinal adenopathy.   Skin:     General: Skin is warm and dry.      Findings: No bruising, erythema or rash.   Neurological:      Mental Status: She is alert and oriented to person, place, and time.      Coordination: Coordination normal.   Psychiatric:         Mood and Affect: Mood normal.         Behavior: Behavior normal.         Thought Content: Thought content normal.         Judgment: Judgment normal.       Physical Exam    Result Review :   The following data was reviewed by: TONEY Hirsch on 07/17/2024:       Results  Laboratory Studies  Last A1c was 6.        Current Outpatient Medications on File Prior to Visit   Medication Sig    Alcohol Swabs (B-D SINGLE USE SWABS REGULAR) pads     alendronate (FOSAMAX) 70 MG tablet Take 1 tablet by mouth Every 7 (Seven) Days.    allopurinol (ZYLOPRIM) 300 MG tablet Daily.    atorvastatin (LIPITOR) 80 MG tablet Take 1 tablet by mouth Daily.    azelastine (ASTELIN) 0.1 % nasal spray USE 2 SPRAYS IN EACH NOSTRIL TWICE DAILY AS DIRECTED BY PROVIDER    Blood Glucose Calibration (True Metrix Level 1) Low  solution     Blood Glucose Monitoring Suppl (True Metrix Meter) w/Device kit     cetirizine (zyrTEC) 10 MG tablet Take 1 tablet by mouth At Night As Needed for Allergies (sore throat and cough).    cloNIDine (CATAPRES) 0.1 MG tablet Take 1 tablet by mouth Every 6 (Six) Hours As Needed for High Blood Pressure (for blood pressure >160/110).    Diclofenac Sodium (VOLTAREN) 1 % gel gel     Glucose Blood (BLOOD GLUCOSE TEST) strip Test blood sugar daily and prn    guaifenesin (ROBITUSSIN) 100 MG/5ML liquid Take 10 mL by mouth Every 4 (Four) Hours As Needed for Cough.    HYDROcodone-acetaminophen (NORCO)  MG per tablet TAKE 1 TABLET BY MOUTH THREE TIMES A DAY  EFFECTIVE DATE 3 21 19    ipratropium (ATROVENT) 0.06 % nasal spray USE 2 SPRAYS IN EACH NOSTRIL TWICE DAILY AS DIRECTED BY PROVIDER    Lancet Devices (TRUEdraw Lancing Device) misc     metoprolol succinate XL (TOPROL-XL) 100 MG 24 hr tablet Take 1 tablet by mouth Daily.    olmesartan-hydrochlorothiazide (BENICAR HCT) 40-12.5 MG per tablet Daily.    pantoprazole (PROTONIX) 40 MG EC tablet Take 1 tablet by mouth Daily.    SYNTHROID 100 MCG tablet Take 80 mcg by mouth Daily.    topiramate (TOPAMAX) 100 MG tablet Take 1 tablet by mouth 2 (Two) Times a Day.    TRUEplus Lancets 33G misc     vitamin D (ERGOCALCIFEROL) 73261 UNITS capsule capsule Take 1 capsule by mouth 1 (One) Time Per Week.    albuterol sulfate  (90 Base) MCG/ACT inhaler albuterol sulfate HFA 90 mcg/actuation aerosol inhaler   Inhale 2 puffs every 4 hours by inhalation route as needed. (Patient not taking: Reported on 7/17/2024)    nystatin (MYCOSTATIN) 664773 UNIT/GM ointment Apply 1 Application topically to the appropriate area as directed 2 (Two) Times a Day. (Patient not taking: Reported on 7/17/2024)     No current facility-administered medications on file prior to visit.          Assessment and Plan      Well woman GYN exam.   Pap smear not indicated per ASCCP guidelines.   Pelvic exam  with chaperone present.     Will have lab work at PCP.     Colonoscopy is up to date    Bone density pt reports followed by PCP.     Mammogram followed by PCP.     Assessment & Plan  1. Vaginal discharge.  A vaginal swab was collected, mycoplasma, BV panel and STD3.     Diagnoses and all orders for this visit:    1. Encounter for gynecological examination without abnormal finding (Primary)    2. Vaginal discharge  -     Genital Mycoplasmas HANY, Swab - Swab, Vagina  -     NuSwab VG+ - Swab, Vagina          BMI is >= 30 and <35. (Class 1 Obesity). The following options were offered after discussion;: information on healthy weight added to patient's after visit summary        Follow Up   Return if symptoms worsen or fail to improve, for Annual physical.    Patient was given instructions and counseling regarding her condition or for health maintenance advice. Please see specific information pulled into the AVS if appropriate.     Patient or patient representative verbalized consent for the use of Ambient Listening during the visit with  TONEY Hirsch for chart documentation. 7/30/2024  06:52 CDT

## 2024-07-21 LAB
A VAGINAE DNA VAG QL NAA+PROBE: ABNORMAL SCORE
BVAB2 DNA VAG QL NAA+PROBE: ABNORMAL SCORE
C ALBICANS DNA VAG QL NAA+PROBE: NEGATIVE
C GLABRATA DNA VAG QL NAA+PROBE: NEGATIVE
C TRACH DNA SPEC QL NAA+PROBE: NEGATIVE
M GENITALIUM DNA SPEC QL NAA+PROBE: NEGATIVE
M HOMINIS DNA SPEC QL NAA+PROBE: NEGATIVE
MEGA1 DNA VAG QL NAA+PROBE: ABNORMAL SCORE
N GONORRHOEA DNA VAG QL NAA+PROBE: NEGATIVE
T VAGINALIS DNA VAG QL NAA+PROBE: POSITIVE
UREAPLASMA DNA SPEC QL NAA+PROBE: NEGATIVE

## 2024-07-24 RX ORDER — METRONIDAZOLE 500 MG/1
500 TABLET ORAL 2 TIMES DAILY
Qty: 14 TABLET | Refills: 0 | Status: SHIPPED | OUTPATIENT
Start: 2024-07-24 | End: 2024-07-31

## 2024-07-30 NOTE — PATIENT INSTRUCTIONS

## 2024-08-07 ENCOUNTER — OFFICE VISIT (OUTPATIENT)
Dept: OTOLARYNGOLOGY | Facility: CLINIC | Age: 74
End: 2024-08-07
Payer: MEDICARE

## 2024-08-07 VITALS
WEIGHT: 178 LBS | DIASTOLIC BLOOD PRESSURE: 62 MMHG | HEIGHT: 63 IN | HEART RATE: 101 BPM | SYSTOLIC BLOOD PRESSURE: 102 MMHG | BODY MASS INDEX: 31.54 KG/M2 | TEMPERATURE: 96.8 F

## 2024-08-07 DIAGNOSIS — J32.9 CHRONIC SINUSITIS, UNSPECIFIED LOCATION: ICD-10-CM

## 2024-08-07 DIAGNOSIS — R09.82 PND (POST-NASAL DRIP): Primary | ICD-10-CM

## 2024-08-07 DIAGNOSIS — J30.1 ALLERGIC RHINITIS DUE TO POLLEN, UNSPECIFIED SEASONALITY: ICD-10-CM

## 2024-08-07 DIAGNOSIS — H61.22 IMPACTED CERUMEN OF LEFT EAR: ICD-10-CM

## 2024-08-07 RX ORDER — FLUTICASONE PROPIONATE 50 MCG
2 SPRAY, SUSPENSION (ML) NASAL DAILY
Qty: 16 G | Refills: 3 | Status: SHIPPED | OUTPATIENT
Start: 2024-08-07 | End: 2024-08-07

## 2024-08-07 RX ORDER — FLUTICASONE PROPIONATE 50 MCG
2 SPRAY, SUSPENSION (ML) NASAL DAILY
Qty: 16 G | Refills: 3 | Status: SHIPPED | OUTPATIENT
Start: 2024-08-07 | End: 2024-11-05

## 2024-08-07 RX ORDER — LEVOTHYROXINE SODIUM 88 UG/1
TABLET ORAL
COMMUNITY
Start: 2024-07-30

## 2024-08-07 RX ORDER — AZELASTINE 1 MG/ML
2 SPRAY, METERED NASAL 2 TIMES DAILY
Qty: 120 ML | Refills: 10 | Status: SHIPPED | OUTPATIENT
Start: 2024-08-07 | End: 2024-08-07

## 2024-08-07 RX ORDER — AZELASTINE 1 MG/ML
2 SPRAY, METERED NASAL 2 TIMES DAILY
Qty: 120 ML | Refills: 10 | Status: SHIPPED | OUTPATIENT
Start: 2024-08-07 | End: 2024-11-05

## 2024-08-07 NOTE — PROGRESS NOTES
YOB: 1950  Location: [facility]  Location Address: [address] [place] [address], [facility], [address] [place] [address]  Location Phone: [phone]    Chief Complaint   Patient presents with    Post nasal drip    Ear Problem     Left ear pain        History of Present Illness  Camila Wallace is a 74 y.o. female.  Camila Wallace is here for follow up of ENT complaints. The patient has had problems with nasal congestion and postnasal drip and cerumen impaction.  Patient feels as if seasonal allergies have improved somewhat since last visit she is using nasal sprays every other day.  She does complain today of left ear pain       Past Medical History:   Diagnosis Date    Allergic rhinitis     Chronic sinusitis     Sommer bullosa     Disease of thyroid gland     Diverticulosis     GERD (gastroesophageal reflux disease)     Graves disease     HLD (hyperlipidemia)     HTN (hypertension)     Obstructive sleep apnea     Type 2 diabetes mellitus     Vasomotor rhinitis        Past Surgical History:   Procedure Laterality Date    CHOLECYSTECTOMY      COLONOSCOPY  10/29/2013    diverticulosis    COLONOSCOPY N/A 7/10/2018    Procedure: COLONOSCOPY WITH ANESTHESIA;  Surgeon: Donald Menchaca DO;  Location: [facility];  Service: Gastroenterology    COLONOSCOPY N/A 2023    Procedure: COLONOSCOPY WITH ANESTHESIA;  Surgeon: Donald Menchaca DO;  Location: [facility] ENDOSCOPY;  Service: Gastroenterology;  Laterality: N/A;  pre; hx polyps  post: polyp. diverticuloisis.   Sandra Wells PA    FOOT SURGERY      TUBAL ABDOMINAL LIGATION      TYMPANIC MEMBRANE REPAIR         Outpatient Medications Marked as Taking for the 24 encounter (Office Visit) with Sweta Hebert APRN   Medication Sig Dispense Refill    albuterol sulfate  (90 Base) MCG/ACT inhaler       Alcohol Swabs (B-D SINGLE USE SWABS REGULAR) pads       alendronate (FOSAMAX) 70 MG tablet Take 1 tablet by mouth Every 7 (Seven) Days.       allopurinol (ZYLOPRIM) 300 MG tablet Daily.      atorvastatin (LIPITOR) 80 MG tablet Take 1 tablet by mouth Daily.      azelastine (ASTELIN) 0.1 % nasal spray 2 sprays into the nostril(s) as directed by provider 2 (Two) Times a Day for 90 days. Use in each nostril as directed 120 mL 10    Blood Glucose Calibration (True Metrix Level 1) Low solution       Blood Glucose Monitoring Suppl (True Metrix Meter) w/Device kit       cetirizine (zyrTEC) 10 MG tablet Take 1 tablet by mouth At Night As Needed for Allergies (sore throat and cough). 30 tablet 11    cloNIDine (CATAPRES) 0.1 MG tablet Take 1 tablet by mouth Every 6 (Six) Hours As Needed for High Blood Pressure (for blood pressure >160/110). 30 tablet 0    Diclofenac Sodium (VOLTAREN) 1 % gel gel       fluticasone (FLONASE) 50 MCG/ACT nasal spray 2 sprays into the nostril(s) as directed by provider Daily for 90 days. 16 g 3    Glucose Blood (BLOOD GLUCOSE TEST) strip Test blood sugar daily and prn      guaifenesin (ROBITUSSIN) 100 MG/5ML liquid Take 10 mL by mouth Every 4 (Four) Hours As Needed for Cough. 236 mL 0    HYDROcodone-acetaminophen (NORCO)  MG per tablet TAKE 1 TABLET BY MOUTH THREE TIMES A DAY  EFFECTIVE DATE 3 21 19  0    ipratropium (ATROVENT) 0.06 % nasal spray USE 2 SPRAYS IN EACH NOSTRIL TWICE DAILY AS DIRECTED BY PROVIDER 75 mL 3    Lancet Devices (TRUEdraw Lancing Device) [facility]       levothyroxine (SYNTHROID, LEVOTHROID) 88 MCG tablet       metoprolol succinate XL (TOPROL-XL) 100 MG 24 hr tablet Take 1 tablet by mouth Daily.      nystatin (MYCOSTATIN) 429267 UNIT/GM ointment Apply 1 Application topically to the appropriate area as directed 2 (Two) Times a Day. 30 g 0    olmesartan-hydrochlorothiazide (BENICAR HCT) 40-12.5 MG per tablet Daily.      pantoprazole (PROTONIX) 40 MG EC tablet Take 1 tablet by mouth Daily.      topiramate (TOPAMAX) 100 MG tablet Take 1 tablet by mouth 2 (Two) Times a Day.      TRUEplus Lancets 33G misc        vitamin D (ERGOCALCIFEROL) 61120 UNITS capsule capsule Take 1 capsule by mouth 1 (One) Time Per Week.      [DISCONTINUED] azelastine (ASTELIN) 0.1 % nasal spray USE 2 SPRAYS IN EACH NOSTRIL TWICE DAILY AS DIRECTED BY PROVIDER 120 mL 10    [DISCONTINUED] azelastine (ASTELIN) 0.1 % nasal spray 2 sprays into the nostril(s) as directed by provider 2 (Two) Times a Day for 90 days. Use in each nostril as directed 120 mL 10    [DISCONTINUED] SYNTHROID 100 MCG tablet Take 80 mcg by mouth Daily.         Metformin and related    Family History   Problem Relation Age of Onset    Diabetes Mother     Heart disease Mother     Hypertension Mother     Hypertension Father     Diabetes Brother     Hypertension Brother     Hyperlipidemia Brother     Colon polyps Brother     Prostate cancer Brother     Colon polyps Daughter     Hyperlipidemia Maternal Uncle     Thyroid disease Maternal Uncle     Cancer Maternal Grandmother        Social History     Socioeconomic History    Marital status: Single   Tobacco Use    Smoking status: Never     Passive exposure: Never    Smokeless tobacco: Never   Vaping Use    Vaping status: Never Used   Substance and Sexual Activity    Alcohol use: Yes     Comment: occasionally    Drug use: No    Sexual activity: Yes       Review of Systems   Constitutional: Negative.    HENT:  Positive for ear pain, postnasal drip and rhinorrhea.    Allergic/Immunologic: Positive for environmental allergies.       Vitals:    08/07/24 1326   BP: 102/62   Pulse: 101   Temp: 96.8 °F (36 °C)       Body mass index is 31.53 kg/m².    Objective     Physical Exam  Vitals reviewed.   Constitutional:       Appearance: She is obese.   HENT:      Head: Normocephalic.      Right Ear: Tympanic membrane, ear canal and external ear normal.      Left Ear: External ear normal. There is impacted cerumen.      Nose: Nose normal.      Comments: Left-sided nasal septal crusting     Mouth/Throat:      Lips: Pink.      Mouth: Mucous membranes are  moist.      Pharynx: Uvula midline.   Musculoskeletal:      Cervical back: Full passive range of motion without pain.   Neurological:      Mental Status: She is alert.       Ear Cerumen Removal    Date/Time: 8/7/2024 2:28 PM    Performed by: Sweta Hebert APRN  Authorized by: Sweta Hebert APRN  Consent: Verbal consent obtained.  Consent given by: patient  Patient understanding: patient states understanding of the procedure being performed  Patient identity confirmed: verbally with patient    Anesthesia:  Local Anesthetic: none  Location details: left ear  Patient tolerance: patient tolerated the procedure well with no immediate complications  Procedure type: instrumentation, curette         Assessment & Plan   Diagnoses and all orders for this visit:    1. PND (post-nasal drip) (Primary)    2. Allergic rhinitis due to pollen, unspecified seasonality    3. Chronic sinusitis, unspecified location    4. Impacted cerumen of left ear    Other orders  -     mupirocin (BACTROBAN) 2 % ointment; Apply 1 Application topically to the appropriate area as directed 2 (Two) Times a Day for 14 days.  Dispense: 22 g; Refill: 0  -     Discontinue: azelastine (ASTELIN) 0.1 % nasal spray; 2 sprays into the nostril(s) as directed by provider 2 (Two) Times a Day for 90 days. Use in each nostril as directed  Dispense: 120 mL; Refill: 10  -     Discontinue: fluticasone (FLONASE) 50 MCG/ACT nasal spray; 2 sprays into the nostril(s) as directed by provider Daily for 90 days.  Dispense: 16 g; Refill: 3  -     azelastine (ASTELIN) 0.1 % nasal spray; 2 sprays into the nostril(s) as directed by provider 2 (Two) Times a Day for 90 days. Use in each nostril as directed  Dispense: 120 mL; Refill: 10  -     fluticasone (FLONASE) 50 MCG/ACT nasal spray; 2 sprays into the nostril(s) as directed by provider Daily for 90 days.  Dispense: 16 g; Refill: 3  -     Ear Cerumen Removal      * Surgery not found *  Orders Placed This Encounter   Procedures     Ear Cerumen Removal     This order was created via procedure documentation     Order Specific Question:   Release to patient     Answer:   Routine Release [[id number]]     Return in about 3 months (around 11/7/2024).     Can use baby oil to the ears 2-3 times per week.  Use nasal sprays daily  Patient Instructions   For the best response, use your nasal sprays every day without skipping doses. It may take several weeks before the full effect is acheived.

## 2024-08-29 ENCOUNTER — OFFICE VISIT (OUTPATIENT)
Dept: OBSTETRICS AND GYNECOLOGY | Age: 74
End: 2024-08-29
Payer: MEDICARE

## 2024-08-29 VITALS
BODY MASS INDEX: 31.18 KG/M2 | WEIGHT: 176 LBS | SYSTOLIC BLOOD PRESSURE: 110 MMHG | HEIGHT: 63 IN | DIASTOLIC BLOOD PRESSURE: 78 MMHG

## 2024-08-29 DIAGNOSIS — Z11.3 SCREENING FOR STD (SEXUALLY TRANSMITTED DISEASE): Primary | ICD-10-CM

## 2024-08-29 PROCEDURE — 99213 OFFICE O/P EST LOW 20 MIN: CPT | Performed by: NURSE PRACTITIONER

## 2024-08-29 PROCEDURE — 1160F RVW MEDS BY RX/DR IN RCRD: CPT | Performed by: NURSE PRACTITIONER

## 2024-08-29 PROCEDURE — 1159F MED LIST DOCD IN RCRD: CPT | Performed by: NURSE PRACTITIONER

## 2024-08-29 NOTE — PROGRESS NOTES
"Chief Complaint  Gynecologic Exam (Pt is here for retesting from 7/17/24 positive trichomonas.  Pt has no complaints today, denies any odor/discharge. )  History of Present Illness  The patient is a 74-year-old female who presents for follow-up on the last results.    She has a history of trichomonas, a sexually transmitted disease, with intermittent negative results. She reports that her partners consistently deny having any infections. Currently, she denies experiencing any itching, odor, or discharge. She also denies any pain during urination.    Subjective          Camila Wallace presents to [facility]  History of Present Illness    Review of Systems   Constitutional:  Negative for activity change, appetite change, fatigue and fever.   Respiratory:  Negative for apnea and shortness of breath.    Cardiovascular:  Negative for chest pain and palpitations.   Gastrointestinal:  Negative for abdominal distention, abdominal pain, constipation, diarrhea, nausea and vomiting.   Endocrine: Negative for cold intolerance and heat intolerance.   Genitourinary:  Negative for difficulty urinating, frequency, menstrual problem, pelvic pain, vaginal bleeding, vaginal discharge and vaginal pain.   Neurological:  Negative for headaches.   Psychiatric/Behavioral:  Negative for agitation and sleep disturbance.          Objective   Vital Signs:   /78   Ht 160 cm (63\")   Wt 79.8 kg (176 lb)   BMI 31.18 kg/m²     Physical Exam  Vitals reviewed.   Constitutional:       Appearance: She is well-developed.   Eyes:      General:         Right eye: No discharge.         Left eye: No discharge.   Cardiovascular:      Rate and Rhythm: Normal rate and regular rhythm.   Pulmonary:      Effort: Pulmonary effort is normal.      Breath sounds: Normal breath sounds.   Skin:     General: Skin is warm.   Neurological:      Mental Status: She is alert and oriented to person, place, and time.   Psychiatric:         " Behavior: Behavior normal.         Thought Content: Thought content normal.         Judgment: Judgment normal.       Physical Exam      Result Review :   The following data was reviewed by: TONEY Hirsch on 08/29/2024:           Results  Laboratory Studies  Trichomonas detected in last test.    Current Outpatient Medications on File Prior to Visit   Medication Sig    albuterol sulfate  (90 Base) MCG/ACT inhaler     Alcohol Swabs (B-D SINGLE USE SWABS REGULAR) pads     alendronate (FOSAMAX) 70 MG tablet Take 1 tablet by mouth Every 7 (Seven) Days.    allopurinol (ZYLOPRIM) 300 MG tablet Daily.    atorvastatin (LIPITOR) 80 MG tablet Take 1 tablet by mouth Daily.    azelastine (ASTELIN) 0.1 % nasal spray 2 sprays into the nostril(s) as directed by provider 2 (Two) Times a Day for 90 days. Use in each nostril as directed    Blood Glucose Calibration (True Metrix Level 1) Low solution     cetirizine (zyrTEC) 10 MG tablet Take 1 tablet by mouth At Night As Needed for Allergies (sore throat and cough).    cloNIDine (CATAPRES) 0.1 MG tablet Take 1 tablet by mouth Every 6 (Six) Hours As Needed for High Blood Pressure (for blood pressure >160/110).    Diclofenac Sodium (VOLTAREN) 1 % gel gel     fluticasone (FLONASE) 50 MCG/ACT nasal spray 2 sprays into the nostril(s) as directed by provider Daily for 90 days.    Glucose Blood (BLOOD GLUCOSE TEST) strip Test blood sugar daily and prn    guaifenesin (ROBITUSSIN) 100 MG/5ML liquid Take 10 mL by mouth Every 4 (Four) Hours As Needed for Cough.    HYDROcodone-acetaminophen (NORCO)  MG per tablet TAKE 1 TABLET BY MOUTH THREE TIMES A DAY  EFFECTIVE DATE 3 21 19    ipratropium (ATROVENT) 0.06 % nasal spray USE 2 SPRAYS IN EACH NOSTRIL TWICE DAILY AS DIRECTED BY PROVIDER    Lancet Devices (TRUEdraw Lancing Device) misc     levothyroxine (SYNTHROID, LEVOTHROID) 88 MCG tablet     metoprolol succinate XL (TOPROL-XL) 100 MG 24 hr tablet Take 1 tablet by mouth  Daily.    nystatin (MYCOSTATIN) 445047 UNIT/GM ointment Apply 1 Application topically to the appropriate area as directed 2 (Two) Times a Day.    olmesartan-hydrochlorothiazide (BENICAR HCT) 40-12.5 MG per tablet Daily.    pantoprazole (PROTONIX) 40 MG EC tablet Take 1 tablet by mouth Daily.    topiramate (TOPAMAX) 100 MG tablet Take 1 tablet by mouth 2 (Two) Times a Day.    TRUEplus Lancets 33G misc     vitamin D (ERGOCALCIFEROL) 07611 UNITS capsule capsule Take 1 capsule by mouth 1 (One) Time Per Week.    Blood Glucose Monitoring Suppl (True Metrix Meter) w/Device kit      No current facility-administered medications on file prior to visit.          Assessment and Plan      Assessment & Plan  1. Trichomonas.  She has a history of trichomonas, a sexually transmitted disease. A urine test for trichomonas will be conducted today. She is encouraged to seek medical attention whenever she feels the need for testing.     Diagnoses and all orders for this visit:    1. Screening for STD (sexually transmitted disease) (Primary)  -     Chlamydia trachomatis, Neisseria gonorrhoeae, Trichomonas vaginalis, PCR - Urine, Urine, Clean Catch                  Follow Up   Return for Annual physical.    Patient was given instructions and counseling regarding her condition or for health maintenance advice. Please see specific information pulled into the AVS if appropriate.       Patient or patient representative verbalized consent for the use of Ambient Listening during the visit with  TONEY Hirsch for chart documentation. 9/10/2024  22:21 CDT

## 2024-09-01 LAB
C TRACH RRNA SPEC QL NAA+PROBE: NEGATIVE
N GONORRHOEA RRNA SPEC QL NAA+PROBE: NEGATIVE
T VAGINALIS RRNA SPEC QL NAA+PROBE: NEGATIVE

## 2024-09-11 NOTE — PATIENT INSTRUCTIONS

## 2024-09-17 ENCOUNTER — OFFICE VISIT (OUTPATIENT)
Age: 74
End: 2024-09-17
Payer: MEDICARE

## 2024-09-17 ENCOUNTER — HOSPITAL ENCOUNTER (OUTPATIENT)
Dept: GENERAL RADIOLOGY | Age: 74
Discharge: HOME | End: 2024-09-17
Payer: MEDICARE

## 2024-09-17 VITALS
RESPIRATION RATE: 18 BRPM | HEART RATE: 74 BPM | WEIGHT: 181 LBS | TEMPERATURE: 97.8 F | OXYGEN SATURATION: 98 % | DIASTOLIC BLOOD PRESSURE: 72 MMHG | BODY MASS INDEX: 33.11 KG/M2 | SYSTOLIC BLOOD PRESSURE: 136 MMHG

## 2024-09-17 DIAGNOSIS — M54.42 ACUTE LEFT-SIDED LOW BACK PAIN WITH LEFT-SIDED SCIATICA: Primary | ICD-10-CM

## 2024-09-17 DIAGNOSIS — M54.42 ACUTE LEFT-SIDED LOW BACK PAIN WITH LEFT-SIDED SCIATICA: ICD-10-CM

## 2024-09-17 PROCEDURE — 3017F COLORECTAL CA SCREEN DOC REV: CPT

## 2024-09-17 PROCEDURE — 1123F ACP DISCUSS/DSCN MKR DOCD: CPT

## 2024-09-17 PROCEDURE — 3078F DIAST BP <80 MM HG: CPT

## 2024-09-17 PROCEDURE — 99203 OFFICE O/P NEW LOW 30 MIN: CPT

## 2024-09-17 PROCEDURE — 1090F PRES/ABSN URINE INCON ASSESS: CPT

## 2024-09-17 PROCEDURE — G8427 DOCREV CUR MEDS BY ELIG CLIN: HCPCS

## 2024-09-17 PROCEDURE — G8419 CALC BMI OUT NRM PARAM NOF/U: HCPCS

## 2024-09-17 PROCEDURE — 3075F SYST BP GE 130 - 139MM HG: CPT

## 2024-09-17 PROCEDURE — 96372 THER/PROPH/DIAG INJ SC/IM: CPT

## 2024-09-17 PROCEDURE — 1036F TOBACCO NON-USER: CPT

## 2024-09-17 PROCEDURE — 72100 X-RAY EXAM L-S SPINE 2/3 VWS: CPT

## 2024-09-17 PROCEDURE — G8399 PT W/DXA RESULTS DOCUMENT: HCPCS

## 2024-09-17 RX ORDER — OLMESARTAN MEDOXOMIL AND HYDROCHLOROTHIAZIDE 40/12.5 40; 12.5 MG/1; MG/1
TABLET ORAL DAILY
COMMUNITY
Start: 2021-01-22

## 2024-09-17 RX ORDER — ALLOPURINOL 300 MG/1
TABLET ORAL
COMMUNITY
Start: 2024-08-15

## 2024-09-17 RX ORDER — ALBUTEROL SULFATE 90 UG/1
INHALANT RESPIRATORY (INHALATION)
COMMUNITY

## 2024-09-17 RX ORDER — DEXAMETHASONE SODIUM PHOSPHATE 10 MG/ML
5 INJECTION INTRAMUSCULAR; INTRAVENOUS ONCE
Status: COMPLETED | OUTPATIENT
Start: 2024-09-17 | End: 2024-09-17

## 2024-09-17 RX ORDER — GLUCOSAM/CHON-MSM1/C/MANG/BOSW 500-416.6
TABLET ORAL
COMMUNITY
Start: 2024-08-21

## 2024-09-17 RX ORDER — LEVOTHYROXINE SODIUM 88 UG/1
88 TABLET ORAL DAILY
COMMUNITY
Start: 2024-07-30

## 2024-09-17 RX ORDER — ATORVASTATIN CALCIUM 80 MG/1
TABLET, FILM COATED ORAL
COMMUNITY
Start: 2024-08-15

## 2024-09-17 RX ADMIN — DEXAMETHASONE SODIUM PHOSPHATE 5 MG: 10 INJECTION INTRAMUSCULAR; INTRAVENOUS at 11:41

## 2024-09-17 ASSESSMENT — ENCOUNTER SYMPTOMS
STRIDOR: 0
BACK PAIN: 1
ABDOMINAL DISTENTION: 0
DIARRHEA: 0
ABDOMINAL PAIN: 0
EYE DISCHARGE: 0
CONSTIPATION: 0
VOMITING: 0
COUGH: 0
CHOKING: 0
CHEST TIGHTNESS: 0
EYE REDNESS: 0
WHEEZING: 0
SINUS PAIN: 0
EYE PAIN: 0
COLOR CHANGE: 0
APNEA: 0
SINUS PRESSURE: 0
NAUSEA: 0
SORE THROAT: 0
FACIAL SWELLING: 0
TROUBLE SWALLOWING: 0
SHORTNESS OF BREATH: 0

## 2024-10-14 NOTE — PROGRESS NOTES
[facility] - PODIATRY    Today's Date: 10/18/24    Patient Name: Camila Wallace  MRN: [id number]  CSN: [id number]  PCP: Andrea Yin DO  Referring Provider: No ref. provider found    SUBJECTIVE     Chief Complaint   Patient presents with    Follow-up     Sandra ALBA   08/30/2024 3 MO FU DIABETIC FOOT CARE CLINIC- pt states feet doing ok other than calluses are getting bad again- pt pain     Diabetes     125mg/dl BG last check couple days ago     HPI: Camila Wallace, a 74 y.o.female, comes to clinic as a(n) established patient presenting for diabetic foot exam, complaining of toenail/callus issues, and calluses . Patient has h/o farida bullosa, thyroid disease, GERD, Graves Disease, HLD, HTN, FAUSTO, DM2 . Patient is NIDDM with last stated BG level of 125 mg/dl.  She reports she does not check her blood glucose regularly. She reports reports occasional numbness/tingling in feet. Denies open wounds or sores. States that her toenails are long, thick and discolored.  Patient reports she has been caring for her calluses and corns at home when they become painful.  Admits pain at 5/10 level, described as aching, and centered around foot callus  today.  Patient reports pain with walking due to callused area to the left foot.  Relates previous treatment(s) including care by podiatry . Denies any constitutional symptoms. No other pedal complaints at this time.    Past Medical History:   Diagnosis Date    Allergic rhinitis     Chronic sinusitis     Farida bullosa     Disease of thyroid gland     Diverticulosis 2013    GERD (gastroesophageal reflux disease)     Graves disease     HLD (hyperlipidemia)     HTN (hypertension)     Obstructive sleep apnea     Type 2 diabetes mellitus     Vasomotor rhinitis      Past Surgical History:   Procedure Laterality Date    CHOLECYSTECTOMY      COLONOSCOPY  10/29/2013    diverticulosis    COLONOSCOPY N/A 7/10/2018    Procedure: COLONOSCOPY WITH ANESTHESIA;   Surgeon: Donald Menchaca DO;  Location: [facility] ENDOSCOPY;  Service: Gastroenterology    COLONOSCOPY N/A 6/13/2023    Procedure: COLONOSCOPY WITH ANESTHESIA;  Surgeon: Donald Menchaca DO;  Location: [facility] ENDOSCOPY;  Service: Gastroenterology;  Laterality: N/A;  pre; hx polyps  post: polyp. diverticuloisis.   Sandra Wells PA    FOOT SURGERY      TUBAL ABDOMINAL LIGATION      TYMPANIC MEMBRANE REPAIR       Family History   Problem Relation Age of Onset    Diabetes Mother     Heart disease Mother     Hypertension Mother     Hypertension Father     Diabetes Brother     Hypertension Brother     Hyperlipidemia Brother     Colon polyps Brother     Prostate cancer Brother     Colon polyps Daughter     Hyperlipidemia Maternal Uncle     Thyroid disease Maternal Uncle     Cancer Maternal Grandmother      Social History     Socioeconomic History    Marital status: Single   Tobacco Use    Smoking status: Never     Passive exposure: Never    Smokeless tobacco: Never   Vaping Use    Vaping status: Never Used   Substance and Sexual Activity    Alcohol use: Yes     Comment: occasionally    Drug use: No    Sexual activity: Yes     Allergies   Allergen Reactions    Metformin And Related Swelling     Current Outpatient Medications   Medication Sig Dispense Refill    albuterol sulfate  (90 Base) MCG/ACT inhaler       Alcohol Swabs (B-D SINGLE USE SWABS REGULAR) pads       alendronate (FOSAMAX) 70 MG tablet Take 1 tablet by mouth Every 7 (Seven) Days.      allopurinol (ZYLOPRIM) 300 MG tablet Daily.      atorvastatin (LIPITOR) 80 MG tablet Take 1 tablet by mouth Daily.      azelastine (ASTELIN) 0.1 % nasal spray 2 sprays into the nostril(s) as directed by provider 2 (Two) Times a Day for 90 days. Use in each nostril as directed 120 mL 10    Blood Glucose Calibration (True Metrix Level 1) Low solution       Blood Glucose Monitoring Suppl (True Metrix Meter) w/Device kit       cetirizine (zyrTEC) 10 MG tablet Take 1  tablet by mouth At Night As Needed for Allergies (sore throat and cough). 30 tablet 11    cloNIDine (CATAPRES) 0.1 MG tablet Take 1 tablet by mouth Every 6 (Six) Hours As Needed for High Blood Pressure (for blood pressure >160/110). 30 tablet 0    Diclofenac Sodium (VOLTAREN) 1 % gel gel       fluticasone (FLONASE) 50 MCG/ACT nasal spray 2 sprays into the nostril(s) as directed by provider Daily for 90 days. 16 g 3    Glucose Blood (BLOOD GLUCOSE TEST) strip Test blood sugar daily and prn      guaifenesin (ROBITUSSIN) 100 MG/5ML liquid Take 10 mL by mouth Every 4 (Four) Hours As Needed for Cough. 236 mL 0    HYDROcodone-acetaminophen (NORCO)  MG per tablet TAKE 1 TABLET BY MOUTH THREE TIMES A DAY  EFFECTIVE DATE 3 21 19  0    ipratropium (ATROVENT) 0.06 % nasal spray USE 2 SPRAYS IN EACH NOSTRIL TWICE DAILY AS DIRECTED BY PROVIDER 75 mL 3    Lancet Devices (ISIS sentronics Lancing Device) [facility]       levothyroxine (SYNTHROID, LEVOTHROID) 88 MCG tablet       metoprolol succinate XL (TOPROL-XL) 100 MG 24 hr tablet Take 1 tablet by mouth Daily.      nystatin (MYCOSTATIN) 183251 UNIT/GM ointment Apply 1 Application topically to the appropriate area as directed 2 (Two) Times a Day. 30 g 0    olmesartan-hydrochlorothiazide (BENICAR HCT) 40-12.5 MG per tablet Daily.      pantoprazole (PROTONIX) 40 MG EC tablet Take 1 tablet by mouth Daily.      topiramate (TOPAMAX) 100 MG tablet Take 1 tablet by mouth 2 (Two) Times a Day.      TRUEplus Lancets 33G misc       vitamin D (ERGOCALCIFEROL) 99582 UNITS capsule capsule Take 1 capsule by mouth 1 (One) Time Per Week.       No current facility-administered medications for this visit.     Review of Systems   Constitutional:  Negative for chills and fever.   HENT:  Negative for congestion.    Respiratory:  Negative for shortness of breath.    Cardiovascular:  Positive for leg swelling. Negative for chest pain.   Gastrointestinal:  Negative for constipation, diarrhea, nausea and  vomiting.   Musculoskeletal:  Negative for arthralgias and joint swelling.   Skin:  Negative for wound.        calluses   Neurological:  Positive for numbness.       OBJECTIVE     Vitals:    10/18/24 1356   BP: 126/76       PHYSICAL EXAM  GEN:   Accompanied by none.     Foot/Ankle Exam    GENERAL  Diabetic foot exam performed    Appearance:  appears stated age  Orientation:  AAOx3  Affect:  appropriate  Gait:  unimpaired  Assistance:  independent  Right shoe gear: casual shoe  Left shoe gear: casual shoe    VASCULAR     Right Foot Vascularity   Dorsalis pedis:  2+  Posterior tibial:  2+  Skin temperature:  warm  Edema grading:  None  CFT:  3  Pedal hair growth:  Present  Varicosities:  none     Left Foot Vascularity   Dorsalis pedis:  2+  Posterior tibial:  2+  Skin temperature:  warm  Edema grading:  None  CFT:  3  Pedal hair growth:  Present  Varicosities:  none     NEUROLOGIC     Right Foot Neurologic   Light touch sensation: diminished  Vibratory sensation: diminished  Hot/Cold sensation: diminished  Protective Sensation using [place] Monofilament:   Sites intact: 7  Sites tested: 10     Left Foot Neurologic   Light touch sensation: diminished  Vibratory sensation: diminished  Hot/Cold sensation:  diminished  Protective Sensation using [place]isa Monofilament:   Sites intact: 7  Sites tested: 10    MUSCULOSKELETAL     Right Foot Musculoskeletal   Ecchymosis:  none  Tenderness:  callous right foot and toenail problem    Arch:  Normal  Hammertoe:  Fifth toe and second toe (2nd toe is starting override hallux)  Hallux valgus: Yes (Medial bony eminence with abduction of hallux)    Hallux limitus: No       Left Foot Musculoskeletal   Ecchymosis:  none  Tenderness:  callous left foot and toenail problem  Arch:  Normal  Hammertoe:  Fifth toe  Hallux valgus: No    Hallux limitus: Yes (s/p fusion)      MUSCLE STRENGTH     Right Foot Muscle Strength   Foot dorsiflexion:  5  Foot plantar flexion:  5  Foot  inversion:  5  Foot eversion:  5     Left Foot Muscle Strength   Foot dorsiflexion:  5  Foot plantar flexion:  5  Foot inversion:  5  Foot eversion:  5    RANGE OF MOTION     Right Foot Range of Motion   Foot and ankle ROM within normal limits       Left Foot Range of Motion   Foot and ankle ROM within normal limits    1st MTP extension: 0  1st MTP flexion: 0    DERMATOLOGIC      Right Foot Dermatologic   Skin  Positive for corn.   Nails  1.  Positive for elongated, onychomycosis and abnormal thickness.  2.  Positive for elongated, onychomycosis and abnormal thickness.  3.  Positive for elongated, onychomycosis and abnormal thickness.  4.  Positive for elongated, onychomycosis and abnormal thickness.  5.  Positive for elongated, onychomycosis and abnormal thickness.     Left Foot Dermatologic   Skin  Positive for corn.   Nails  1.  Positive for elongated, onychomycosis and abnormal thickness.  2.  Positive for elongated, onychomycosis and abnormal thickness.  3.  Positive for elongated, onychomycosis and abnormal thickness.  4.  Positive for elongated, onychomycosis and abnormally thick.  5.  Positive for elongated, onychomycosis and abnormally thick.    Image:       RADIOLOGY/NUCLEAR:  No results found.    LABORATORY/CULTURE RESULTS:      PATHOLOGY RESULTS:       ASSESSMENT/PLAN     Diagnoses and all orders for this visit:    1. Onychomycosis (Primary)    2. Chronic kidney disease, stage 3b    3. Foot callus    4. Hallux valgus, right    5. Type 2 diabetes mellitus without complication, without long-term current use of insulin    6. Hammer toes of both feet      Comprehensive lower extremity examination and evaluation was performed.  Discussed findings and treatment plan including risks, benefits, and treatment options with patient in detail. Patient agreed with treatment plan.  After verbal consent obtained, nail(s) x10 debrided of length and thickness with nail nipper without incidence  After verbal consent  obtained, calluses x2 pared utilizing dermal curette and/or scalpel without incidence  Patient may maintain nails and calluses at home utilizing emery board or pumice stone between visits as needed  Reviewed at home diabetic foot care including daily foot checks   Toecaps x 2 dispensed.  Continue diabetic monitoring and control under direction of PCP   Remain conservative with foot deformities   An After Visit Summary was printed and given to the patient at discharge, including (if requested) any available informative/educational handouts regarding diagnosis, treatment, or medications. All questions were answered to patient/family satisfaction. Should symptoms fail to improve or worsen they agree to call or return to clinic or to go to the Emergency Department. Discussed the importance of following up with any needed screening tests/labs/specialist appointments and any requested follow-up recommended by me today. Importance of maintaining follow-up discussed and patient accepts that missed appointments can delay diagnosis and potentially lead to worsening of conditions.  Return in about 3 months (around 1/18/2025) for With Alee ZIEGLER, Schedule Foot Care Clinic., or sooner if acute issues arise.    Lab Frequency Next Occurrence       This document has been electronically signed by TONEY Sarmiento on October 18, 2024 14:23 CDT

## 2024-10-16 ENCOUNTER — TELEPHONE (OUTPATIENT)
Dept: OTOLARYNGOLOGY | Facility: CLINIC | Age: 74
End: 2024-10-16

## 2024-10-16 NOTE — TELEPHONE ENCOUNTER
Caller: MARCOS CLARK    Relationship to patient: SELF    Best call back number: [phone]    Patient is needing: PT HAS EAR PAIN AND TENDERNESS.  PCP TRIED TO CLEAN EARS BUT PAIN WAS TO MUCH AND WAS ADVISED TO CONTACT YOU  1ST HUB AVAIL IS JAN 2025

## 2024-10-18 ENCOUNTER — OFFICE VISIT (OUTPATIENT)
Age: 74
End: 2024-10-18
Payer: MEDICARE

## 2024-10-18 VITALS
DIASTOLIC BLOOD PRESSURE: 76 MMHG | WEIGHT: 176 LBS | BODY MASS INDEX: 31.18 KG/M2 | HEIGHT: 63 IN | SYSTOLIC BLOOD PRESSURE: 126 MMHG

## 2024-10-18 DIAGNOSIS — M20.11 HALLUX VALGUS, RIGHT: ICD-10-CM

## 2024-10-18 DIAGNOSIS — E11.9 TYPE 2 DIABETES MELLITUS WITHOUT COMPLICATION, WITHOUT LONG-TERM CURRENT USE OF INSULIN: ICD-10-CM

## 2024-10-18 DIAGNOSIS — L84 FOOT CALLUS: ICD-10-CM

## 2024-10-18 DIAGNOSIS — B35.1 ONYCHOMYCOSIS: Primary | ICD-10-CM

## 2024-10-18 DIAGNOSIS — N18.32 CHRONIC KIDNEY DISEASE, STAGE 3B: ICD-10-CM

## 2024-10-18 DIAGNOSIS — M20.41 HAMMER TOES OF BOTH FEET: ICD-10-CM

## 2024-10-18 DIAGNOSIS — M20.42 HAMMER TOES OF BOTH FEET: ICD-10-CM

## 2024-10-31 DIAGNOSIS — J32.9 CHRONIC SINUSITIS, UNSPECIFIED LOCATION: ICD-10-CM

## 2024-10-31 DIAGNOSIS — J30.1 ALLERGIC RHINITIS DUE TO POLLEN, UNSPECIFIED SEASONALITY: ICD-10-CM

## 2024-10-31 DIAGNOSIS — R09.82 PND (POST-NASAL DRIP): ICD-10-CM

## 2024-10-31 RX ORDER — IPRATROPIUM BROMIDE 42 UG/1
SPRAY, METERED NASAL
Qty: 75 ML | Refills: 3 | Status: SHIPPED | OUTPATIENT
Start: 2024-10-31

## 2024-11-05 ENCOUNTER — OFFICE VISIT (OUTPATIENT)
Dept: OTOLARYNGOLOGY | Facility: CLINIC | Age: 74
End: 2024-11-05
Payer: MEDICARE

## 2024-11-05 VITALS
HEART RATE: 100 BPM | WEIGHT: 185 LBS | SYSTOLIC BLOOD PRESSURE: 140 MMHG | HEIGHT: 63 IN | TEMPERATURE: 98.2 F | DIASTOLIC BLOOD PRESSURE: 87 MMHG | BODY MASS INDEX: 32.78 KG/M2

## 2024-11-05 DIAGNOSIS — H61.22 IMPACTED CERUMEN OF LEFT EAR: Primary | ICD-10-CM

## 2024-11-05 NOTE — PROGRESS NOTES
Ear Cerumen Removal    Date/Time: 11/5/2024 3:00 PM    Performed by: Sweta Hebert APRN  Authorized by: Sweta Hebert APRN  Consent: Verbal consent obtained.  Consent given by: patient  Patient understanding: patient states understanding of the procedure being performed  Patient identity confirmed: verbally with patient    Anesthesia:  Local Anesthetic: none  Location details: left ear  Patient tolerance: patient tolerated the procedure well with no immediate complications  Procedure type: instrumentation, curette, suction

## 2024-11-17 ENCOUNTER — APPOINTMENT (OUTPATIENT)
Dept: CT IMAGING | Facility: HOSPITAL | Age: 74
End: 2024-11-17
Payer: COMMERCIAL

## 2024-11-17 ENCOUNTER — HOSPITAL ENCOUNTER (EMERGENCY)
Facility: HOSPITAL | Age: 74
Discharge: HOME | End: 2024-11-17
Attending: EMERGENCY MEDICINE | Admitting: EMERGENCY MEDICINE
Payer: COMMERCIAL

## 2024-11-17 VITALS
BODY MASS INDEX: 32.25 KG/M2 | RESPIRATION RATE: 17 BRPM | WEIGHT: 182 LBS | HEIGHT: 63 IN | HEART RATE: 62 BPM | TEMPERATURE: 98.4 F | SYSTOLIC BLOOD PRESSURE: 113 MMHG | DIASTOLIC BLOOD PRESSURE: 76 MMHG | OXYGEN SATURATION: 98 %
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
## 2024-11-17 DIAGNOSIS — M54.2 NECK PAIN: ICD-10-CM

## 2024-11-17 DIAGNOSIS — R10.11 RIGHT UPPER QUADRANT ABDOMINAL PAIN: ICD-10-CM

## 2024-11-17 LAB
ANION GAP SERPL CALCULATED.3IONS-SCNC: 11 MMOL/L (ref 5–15)
BASOPHILS # BLD AUTO: 0.04 10*3/MM3 (ref 0–0.2)
BASOPHILS NFR BLD AUTO: 0.7 % (ref 0–1.5)
BUN SERPL-MCNC: 25 MG/DL (ref 8–23)
BUN/CREAT SERPL: 19.4 (ref 7–25)
CALCIUM SPEC-SCNC: 8.8 MG/DL (ref 8.6–10.5)
CHLORIDE SERPL-SCNC: 103 MMOL/L (ref 98–107)
CO2 SERPL-SCNC: 26 MMOL/L (ref 22–29)
CREAT SERPL-MCNC: 1.29 MG/DL (ref 0.57–1)
DEPRECATED RDW RBC AUTO: 48.5 FL (ref 37–54)
EGFRCR SERPLBLD CKD-EPI 2021: 43.6 ML/MIN/1.73
EOSINOPHIL # BLD AUTO: 0.19 10*3/MM3 (ref 0–0.4)
EOSINOPHIL NFR BLD AUTO: 3.3 % (ref 0.3–6.2)
ERYTHROCYTE [DISTWIDTH] IN BLOOD BY AUTOMATED COUNT: 15.6 % (ref 12.3–15.4)
GLUCOSE SERPL-MCNC: 99 MG/DL (ref 65–99)
HCT VFR BLD AUTO: 31.7 % (ref 34–46.6)
HGB BLD-MCNC: 10.5 G/DL (ref 12–15.9)
IMM GRANULOCYTES # BLD AUTO: 0.01 10*3/MM3 (ref 0–0.05)
IMM GRANULOCYTES NFR BLD AUTO: 0.2 % (ref 0–0.5)
LYMPHOCYTES # BLD AUTO: 1.51 10*3/MM3 (ref 0.7–3.1)
LYMPHOCYTES NFR BLD AUTO: 26.4 % (ref 19.6–45.3)
MCH RBC QN AUTO: 28.6 PG (ref 26.6–33)
MCHC RBC AUTO-ENTMCNC: 33.1 G/DL (ref 31.5–35.7)
MCV RBC AUTO: 86.4 FL (ref 79–97)
MONOCYTES # BLD AUTO: 0.48 10*3/MM3 (ref 0.1–0.9)
MONOCYTES NFR BLD AUTO: 8.4 % (ref 5–12)
NEUTROPHILS NFR BLD AUTO: 3.48 10*3/MM3 (ref 1.7–7)
NEUTROPHILS NFR BLD AUTO: 61 % (ref 42.7–76)
NRBC BLD AUTO-RTO: 0 /100 WBC (ref 0–0.2)
PLATELET # BLD AUTO: 292 10*3/MM3 (ref 140–450)
PMV BLD AUTO: 9.9 FL (ref 6–12)
POTASSIUM SERPL-SCNC: 4.5 MMOL/L (ref 3.5–5.2)
RBC # BLD AUTO: 3.67 10*6/MM3 (ref 3.77–5.28)
SODIUM SERPL-SCNC: 140 MMOL/L (ref 136–145)
WBC NRBC COR # BLD AUTO: 5.71 10*3/MM3 (ref 3.4–10.8)

## 2024-11-17 PROCEDURE — 74176 CT ABD & PELVIS W/O CONTRAST: CPT

## 2024-11-17 PROCEDURE — 99284 EMERGENCY DEPT VISIT MOD MDM: CPT

## 2024-11-17 PROCEDURE — 71250 CT THORAX DX C-: CPT

## 2024-11-17 PROCEDURE — 36415 COLL VENOUS BLD VENIPUNCTURE: CPT

## 2024-11-17 PROCEDURE — 85025 COMPLETE CBC W/AUTO DIFF WBC: CPT | Performed by: EMERGENCY MEDICINE

## 2024-11-17 PROCEDURE — 72125 CT NECK SPINE W/O DYE: CPT

## 2024-11-17 PROCEDURE — 80048 BASIC METABOLIC PNL TOTAL CA: CPT | Performed by: EMERGENCY MEDICINE
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
## 2024-12-10 ENCOUNTER — TRANSCRIBE ORDERS (OUTPATIENT)
Dept: PHYSICAL THERAPY | Facility: HOSPITAL | Age: 74
End: 2024-12-10
Payer: COMMERCIAL

## 2024-12-10 DIAGNOSIS — M25.561 RIGHT KNEE PAIN, UNSPECIFIED CHRONICITY: Primary | ICD-10-CM

## 2024-12-27 ENCOUNTER — OFFICE VISIT (OUTPATIENT)
Age: 74
End: 2024-12-27

## 2024-12-27 VITALS
BODY MASS INDEX: 34.02 KG/M2 | WEIGHT: 186 LBS | SYSTOLIC BLOOD PRESSURE: 148 MMHG | HEART RATE: 110 BPM | OXYGEN SATURATION: 98 % | RESPIRATION RATE: 24 BRPM | TEMPERATURE: 99 F | DIASTOLIC BLOOD PRESSURE: 86 MMHG

## 2024-12-27 DIAGNOSIS — J02.9 SORE THROAT: ICD-10-CM

## 2024-12-27 DIAGNOSIS — R03.0 ELEVATED BLOOD PRESSURE READING: ICD-10-CM

## 2024-12-27 DIAGNOSIS — R05.1 ACUTE COUGH: ICD-10-CM

## 2024-12-27 DIAGNOSIS — J06.9 VIRAL UPPER RESPIRATORY ILLNESS: Primary | ICD-10-CM

## 2024-12-27 LAB
INFLUENZA A ANTIBODY: NEGATIVE
INFLUENZA B ANTIBODY: NEGATIVE
Lab: NORMAL
QC PASS/FAIL: NORMAL
S PYO AG THROAT QL: NORMAL
SARS-COV-2, POC: NORMAL

## 2024-12-27 RX ORDER — BROMPHENIRAMINE MALEATE, PSEUDOEPHEDRINE HYDROCHLORIDE, AND DEXTROMETHORPHAN HYDROBROMIDE 2; 30; 10 MG/5ML; MG/5ML; MG/5ML
10 SYRUP ORAL 4 TIMES DAILY PRN
Qty: 200 ML | Refills: 0 | Status: SHIPPED | OUTPATIENT
Start: 2024-12-27 | End: 2025-01-01

## 2024-12-27 ASSESSMENT — ENCOUNTER SYMPTOMS
TROUBLE SWALLOWING: 0
ABDOMINAL DISTENTION: 0
EYE ITCHING: 0
EYE PAIN: 0
SORE THROAT: 1
ABDOMINAL PAIN: 0
RHINORRHEA: 0
VOMITING: 0
DIARRHEA: 0
APNEA: 0
CONSTIPATION: 0
COLOR CHANGE: 0
COUGH: 1
WHEEZING: 0
CHEST TIGHTNESS: 0
EYE DISCHARGE: 0
SINUS PRESSURE: 0
SHORTNESS OF BREATH: 0
NAUSEA: 1
SINUS PAIN: 0

## 2024-12-27 NOTE — PROGRESS NOTES
[facility]  [facility]  [address] [place] [address] DRIVE  [facility] [address]  Dept: [phone]  Dept Fax: [phone]  Loc: [phone]    Moon Schafer is a 74 y.o. female who presents today for her medical conditions/complaints as noted below.  Moon Schafer is complaining of Cough (X 24h), Congestion, Nausea, and Nasal Congestion      HPI:     Patient presents for evaluation of a productive cough and sore throat that started yesterday. Patient states the cough keeps her up at night. Has used Mucinex OTC with some relief. Denies fevers. Reports she has been around her grandkids who have been sick.    Past Medical History:   Diagnosis Date    Arthritis     Cataract of both eyes     Chronic back pain     Hyperlipidemia     Hypertension     Hypothyroidism     Lumbosacral spondylosis without myelopathy     Lupus (systemic lupus erythematosus) ([facility])     Neuropathy 12/22/2017    Sickle cell anemia ([facility])     carrier    Type 2 diabetes mellitus without complication, without long-term current use of insulin ([facility]) 11/12/2018    Type II or unspecified type diabetes mellitus without mention of complication, not stated as uncontrolled        Past Surgical History:   Procedure Laterality Date    BACK SURGERY      lumbar    CHOLECYSTECTOMY      COLONOSCOPY      FOOT SURGERY Left 1/2014    bunion and hammer toe    TUBAL LIGATION      TYMPANOPLASTY         Family History   Problem Relation Age of Onset    High Cholesterol Sister     High Cholesterol Brother     Sickle Cell Anemia Daughter     Sickle Cell Anemia Son        Social History     Tobacco Use    Smoking status: Never    Smokeless tobacco: Never   Substance Use Topics    Alcohol use: Yes     Comment: occasionally        Current Outpatient Medications   Medication Sig Dispense Refill    brompheniramine-pseudoephedrine-DM 2-30-10 MG/5ML syrup Take 10 mLs by mouth 4 times daily as needed for Cough 200 mL 0    albuterol sulfate

## 2024-12-27 NOTE — PATIENT INSTRUCTIONS
Flu, Strep, and COVID negative.   Likely viral in etiology at this time.   Take cough medicine as prescribed. Patient was told to take at night only as we do not want to fully suppress a productive cough.   The patient is to follow up with PCP or return to clinic if symptoms worsen/fail to improve.    Rhomboid Transposition Flap Text: The defect edges were debeveled with a #15 scalpel blade.  Given the location of the defect and the proximity to free margins a rhomboid transposition flap was deemed most appropriate.  Using a sterile surgical marker, an appropriate rhomboid flap was drawn incorporating the defect.    The area thus outlined was incised deep to adipose tissue with a #15 scalpel blade.  The skin margins were undermined to an appropriate distance in all directions utilizing iris scissors.

## 2024-12-30 ENCOUNTER — APPOINTMENT (OUTPATIENT)
Dept: GENERAL RADIOLOGY | Facility: HOSPITAL | Age: 74
End: 2024-12-30
Payer: MEDICARE

## 2024-12-30 PROCEDURE — 71046 X-RAY EXAM CHEST 2 VIEWS: CPT

## 2025-01-08 NOTE — PROGRESS NOTES
[facility] - PODIATRY    Today's Date: 01/20/25    Patient Name: Camila Wallace  MRN: [id number]  CSN: [id number]  PCP: Andrea Yin DO  Referring Provider: No ref. provider found    SUBJECTIVE     Chief Complaint   Patient presents with    Follow-up     Andrea Yin DO 01/20/25  3 months for With Alee ZIEGLER, Schedule diabetic Foot Care Clinic.   Pt is here today for diabetic foot/nail care. Pt denies pain.     Diabetes     HPI: Camlia Wallace, a 74 y.o.female, comes to clinic as a(n) established patient presenting for diabetic foot exam, complaining of toenail/callus issues, and calluses . Patient has h/o afrida bullosa, thyroid disease, GERD, Graves Disease, HLD, HTN, FAUSTO, DM2 . Patient is NIDDM and unsure of last BG level.  She reports she does not check her blood glucose regularly. She reports reports occasional numbness/tingling in feet. Denies open wounds or sores. States that her toenails are long, thick and discolored.  Patient reports she has been feeling on her feet daily and her corns and calluses are no longer problematic.  Denies pain today.    Relates previous treatment(s) including care by podiatry . Denies any constitutional symptoms. No other pedal complaints at this time.    Past Medical History:   Diagnosis Date    Allergic rhinitis     Chronic sinusitis     Farida bullosa     Disease of thyroid gland     Diverticulosis 2013    GERD (gastroesophageal reflux disease)     Graves disease     HLD (hyperlipidemia)     HTN (hypertension)     Obstructive sleep apnea     Type 2 diabetes mellitus     Vasomotor rhinitis      Past Surgical History:   Procedure Laterality Date    CHOLECYSTECTOMY      COLONOSCOPY  10/29/2013    diverticulosis    COLONOSCOPY N/A 7/10/2018    Procedure: COLONOSCOPY WITH ANESTHESIA;  Surgeon: Donald Menchaca DO;  Location: [facility] ENDOSCOPY;  Service: Gastroenterology    COLONOSCOPY N/A 6/13/2023    Procedure: COLONOSCOPY WITH  ANESTHESIA;  Surgeon: Donald Menchaca DO;  Location: [facility] ENDOSCOPY;  Service: Gastroenterology;  Laterality: N/A;  pre; hx polyps  post: polyp. diverticuloisis.   Sandra Wells PA    FOOT SURGERY      TUBAL ABDOMINAL LIGATION      TYMPANIC MEMBRANE REPAIR       Family History   Problem Relation Age of Onset    Diabetes Mother     Heart disease Mother     Hypertension Mother     Hypertension Father     Diabetes Brother     Hypertension Brother     Hyperlipidemia Brother     Colon polyps Brother     Prostate cancer Brother     Colon polyps Daughter     Hyperlipidemia Maternal Uncle     Thyroid disease Maternal Uncle     Cancer Maternal Grandmother      Social History     Socioeconomic History    Marital status: Single   Tobacco Use    Smoking status: Never     Passive exposure: Never    Smokeless tobacco: Never   Vaping Use    Vaping status: Never Used   Substance and Sexual Activity    Alcohol use: Yes     Comment: occasionally    Drug use: No    Sexual activity: Yes     Allergies   Allergen Reactions    Metformin And Related Swelling     Current Outpatient Medications   Medication Sig Dispense Refill    albuterol sulfate  (90 Base) MCG/ACT inhaler Inhale 2 puffs Every 4 (Four) Hours As Needed for Wheezing. 3.1 g 0    Alcohol Swabs (B-D SINGLE USE SWABS REGULAR) pads       alendronate (FOSAMAX) 70 MG tablet Take 1 tablet by mouth Every 7 (Seven) Days.      allopurinol (ZYLOPRIM) 300 MG tablet Daily.      atorvastatin (LIPITOR) 80 MG tablet Take 1 tablet by mouth Daily.      azelastine (ASTELIN) 0.1 % nasal spray Administer 2 sprays into the nostril(s) as directed by provider 2 (Two) Times a Day.      Blood Glucose Calibration (True Metrix Level 1) Low solution       Blood Glucose Monitoring Suppl (True Metrix Meter) w/Device kit       cloNIDine (CATAPRES) 0.1 MG tablet Take 1 tablet by mouth Every 6 (Six) Hours As Needed for High Blood Pressure (for blood pressure >160/110). 30 tablet 0     diclofenac (VOLTAREN) 75 MG EC tablet Take 1 tablet by mouth Every 12 (Twelve) Hours.      Diclofenac Sodium (VOLTAREN) 1 % gel gel Apply 4 g topically to the appropriate area as directed 4 (Four) Times a Day.      Glucose Blood (BLOOD GLUCOSE TEST) strip Test blood sugar daily and prn      HYDROcodone-acetaminophen (NORCO)  MG per tablet TAKE 1 TABLET BY MOUTH THREE TIMES A DAY  EFFECTIVE DATE 3 21 19  0    ipratropium (ATROVENT) 0.06 % nasal spray USE 2 SPRAYS IN EACH NOSTRIL TWICE DAILY AS DIRECTED BY PROVIDER 75 mL 3    Lancet Devices (TRUEdraw Lancing Device) [facility] [place]       levothyroxine (SYNTHROID, LEVOTHROID) 88 MCG tablet Take 1 tablet by mouth Every Morning.      metoprolol succinate XL (TOPROL-XL) 100 MG 24 hr tablet Take 1 tablet by mouth Daily.      naloxone (NARCAN) 4 MG/0.1ML nasal spray Administer 1 spray into the nostril(s) as directed by provider As Needed.      nystatin (MYCOSTATIN) 970372 UNIT/GM ointment Apply 1 Application topically to the appropriate area as directed 2 (Two) Times a Day. 30 g 0    olmesartan-hydrochlorothiazide (BENICAR HCT) 40-12.5 MG per tablet Daily.      pantoprazole (PROTONIX) 40 MG EC tablet Take 1 tablet by mouth Daily.      promethazine-dextromethorphan (PROMETHAZINE-DM) 6.25-15 MG/5ML syrup Take 5 mL by mouth 4 (Four) Times a Day As Needed for Cough. 240 mL 0    topiramate (TOPAMAX) 100 MG tablet Take 1 tablet by mouth 2 (Two) Times a Day.      TRUEplus Lancets 33G misc       vitamin D (ERGOCALCIFEROL) 69465 UNITS capsule capsule Take 1 capsule by mouth 1 (One) Time Per Week.      fluticasone (FLONASE) 50 MCG/ACT nasal spray 2 sprays into the nostril(s) as directed by provider Daily for 90 days. 16 g 3     No current facility-administered medications for this visit.     Review of Systems   Constitutional:  Negative for chills and fever.   HENT:  Negative for congestion.    Respiratory:  Negative for shortness of breath.    Cardiovascular:  Positive for leg swelling.  Negative for chest pain.   Gastrointestinal:  Negative for constipation, diarrhea, nausea and vomiting.   Musculoskeletal:  Negative for arthralgias and joint swelling.   Skin:  Negative for wound.   Neurological:  Positive for numbness.       OBJECTIVE     Vitals:    01/20/25 1316   BP: 120/78   Pulse: 103   SpO2: 99%         PHYSICAL EXAM  GEN:   Accompanied by none.     Foot/Ankle Exam    GENERAL  Diabetic foot exam performed    Appearance:  appears stated age  Orientation:  AAOx3  Affect:  appropriate  Gait:  unimpaired  Assistance:  independent  Right shoe gear: casual shoe  Left shoe gear: casual shoe    VASCULAR     Right Foot Vascularity   Dorsalis pedis:  2+  Posterior tibial:  2+  Skin temperature:  warm  Edema grading:  None  CFT:  3  Pedal hair growth:  Present  Varicosities:  none     Left Foot Vascularity   Dorsalis pedis:  2+  Posterior tibial:  2+  Skin temperature:  warm  Edema grading:  None  CFT:  3  Pedal hair growth:  Present  Varicosities:  none     NEUROLOGIC     Right Foot Neurologic   Light touch sensation: diminished  Vibratory sensation: diminished  Hot/Cold sensation: diminished  Protective Sensation using [place]-Narcisa Monofilament:   Sites intact: 7  Sites tested: 10     Left Foot Neurologic   Light touch sensation: diminished  Vibratory sensation: diminished  Hot/Cold sensation:  diminished  Protective Sensation using [place]-Narcisa Monofilament:   Sites intact: 7  Sites tested: 10    MUSCULOSKELETAL     Right Foot Musculoskeletal   Ecchymosis:  none  Tenderness:  callous right foot and toenail problem    Arch:  Normal  Hammertoe:  Fifth toe and second toe (2nd toe is starting override hallux)  Hallux valgus: Yes (Medial bony eminence with abduction of hallux)    Hallux limitus: No       Left Foot Musculoskeletal   Ecchymosis:  none  Tenderness:  callous left foot and toenail problem  Arch:  Normal  Hammertoe:  Fifth toe  Hallux valgus: No    Hallux limitus: Yes (s/p fusion)       MUSCLE STRENGTH     Right Foot Muscle Strength   Foot dorsiflexion:  5  Foot plantar flexion:  5  Foot inversion:  5  Foot eversion:  5     Left Foot Muscle Strength   Foot dorsiflexion:  5  Foot plantar flexion:  5  Foot inversion:  5  Foot eversion:  5    RANGE OF MOTION     Right Foot Range of Motion   Foot and ankle ROM within normal limits       Left Foot Range of Motion   Foot and ankle ROM within normal limits    1st MTP extension: 0  1st MTP flexion: 0    DERMATOLOGIC      Right Foot Dermatologic   Skin  Positive for corn.   Nails  1.  Positive for elongated, onychomycosis and abnormal thickness.  2.  Positive for elongated, onychomycosis and abnormal thickness.  3.  Positive for elongated, onychomycosis and abnormal thickness.  4.  Positive for elongated, onychomycosis and abnormal thickness.  5.  Positive for elongated, onychomycosis and abnormal thickness.     Left Foot Dermatologic   Skin  Positive for corn.   Nails  1.  Positive for elongated, onychomycosis and abnormal thickness.  2.  Positive for elongated, onychomycosis and abnormal thickness.  3.  Positive for elongated, onychomycosis and abnormal thickness.  4.  Positive for elongated, onychomycosis and abnormally thick.  5.  Positive for elongated, onychomycosis and abnormally thick.    Image:       RADIOLOGY/NUCLEAR:  XR Chest 2 View    Result Date: 12/30/2024  Narrative: EXAM: XR CHEST 2 VW- 12/30/2024 1:22 PM  HISTORY: acute cough; R05.1-Acute cough   COMPARISON: None.  TECHNIQUE: Frontal and lateral radiographs of the chest was obtained.  FINDINGS:  Support Devices: None.  Cardiac and Mediastinal Silhouettes: Normal.  Lungs/Pleura: Mild left basilar parenchymal opacities. No sizable pleural effusion. No visible pneumothorax.  Osseous structures: No acute osseous finding.  Other: None.      Impression:  Mild left basilar parenchymal opacities, atelectasis versus pneumonia.   This report was signed and finalized on 12/30/2024 2:52 PM by  Laz Wright.       LABORATORY/CULTURE RESULTS:      PATHOLOGY RESULTS:       ASSESSMENT/PLAN     Diagnoses and all orders for this visit:    1. Onychomycosis (Primary)    2. Type 2 diabetes mellitus without complication, without long-term current use of insulin    3. Hammer toes of both feet    4. Chronic kidney disease, stage 3b    5. Hallux valgus, right        Comprehensive lower extremity examination and evaluation was performed.  Discussed findings and treatment plan including risks, benefits, and treatment options with patient in detail. Patient agreed with treatment plan.  After verbal consent obtained, nail(s) x10 debrided of length and thickness with nail nipper without incidence  Patient may maintain nails and calluses at home utilizing emery board or pumice stone between visits as needed  Reviewed at home diabetic foot care including daily foot checks   Continue diabetic monitoring and control under direction of PCP   Remain conservative with foot deformities   Continue use of lotion/Vaseline daily.  Patient is at an increased risk of foot complications due to chronic kidney disease.    Continue to follow up with PCP for treatment of CKD and routine labs.    An After Visit Summary was printed and given to the patient at discharge, including (if requested) any available informative/educational handouts regarding diagnosis, treatment, or medications. All questions were answered to patient/family satisfaction. Should symptoms fail to improve or worsen they agree to call or return to clinic or to go to the Emergency Department. Discussed the importance of following up with any needed screening tests/labs/specialist appointments and any requested follow-up recommended by me today. Importance of maintaining follow-up discussed and patient accepts that missed appointments can delay diagnosis and potentially lead to worsening of conditions.  Return in about 3 months (around 4/20/2025) for Diabetic Foot Exam,  Schedule Foot Care Clinic, With Alee ZIEGLER., or sooner if acute issues arise.    Lab Frequency Next Occurrence       This document has been electronically signed by TONEY Sarmiento on January 20, 2025 13:40 CST

## 2025-01-20 ENCOUNTER — OFFICE VISIT (OUTPATIENT)
Age: 75
End: 2025-01-20
Payer: MEDICARE

## 2025-01-20 VITALS
SYSTOLIC BLOOD PRESSURE: 120 MMHG | OXYGEN SATURATION: 99 % | WEIGHT: 181 LBS | DIASTOLIC BLOOD PRESSURE: 78 MMHG | BODY MASS INDEX: 32.07 KG/M2 | HEIGHT: 63 IN | HEART RATE: 103 BPM

## 2025-01-20 DIAGNOSIS — M20.11 HALLUX VALGUS, RIGHT: ICD-10-CM

## 2025-01-20 DIAGNOSIS — M20.41 HAMMER TOES OF BOTH FEET: ICD-10-CM

## 2025-01-20 DIAGNOSIS — N18.32 CHRONIC KIDNEY DISEASE, STAGE 3B: ICD-10-CM

## 2025-01-20 DIAGNOSIS — M20.42 HAMMER TOES OF BOTH FEET: ICD-10-CM

## 2025-01-20 DIAGNOSIS — E11.9 TYPE 2 DIABETES MELLITUS WITHOUT COMPLICATION, WITHOUT LONG-TERM CURRENT USE OF INSULIN: ICD-10-CM

## 2025-01-20 DIAGNOSIS — B35.1 ONYCHOMYCOSIS: Primary | ICD-10-CM

## 2025-02-05 ENCOUNTER — OFFICE VISIT (OUTPATIENT)
Dept: OTOLARYNGOLOGY | Facility: CLINIC | Age: 75
End: 2025-02-05
Payer: MEDICARE

## 2025-02-05 VITALS
WEIGHT: 181 LBS | HEIGHT: 63 IN | SYSTOLIC BLOOD PRESSURE: 128 MMHG | DIASTOLIC BLOOD PRESSURE: 80 MMHG | TEMPERATURE: 98 F | BODY MASS INDEX: 32.07 KG/M2 | HEART RATE: 82 BPM

## 2025-02-05 DIAGNOSIS — J30.1 ALLERGIC RHINITIS DUE TO POLLEN, UNSPECIFIED SEASONALITY: ICD-10-CM

## 2025-02-05 DIAGNOSIS — R09.82 PND (POST-NASAL DRIP): Primary | ICD-10-CM

## 2025-02-05 DIAGNOSIS — H91.93 DECREASED HEARING OF BOTH EARS: ICD-10-CM

## 2025-02-05 RX ORDER — FOLIC ACID 1 MG/1
1 TABLET ORAL DAILY
COMMUNITY
Start: 2025-01-20

## 2025-02-05 NOTE — PROGRESS NOTES
YOB: 1950  Location: [facility]  Location Address: [address] [place] [address], [facility], [address] [place] [address]  Location Phone: [phone]    Chief Complaint   Patient presents with    PND    Cerumen Impaction       History of Present Illness  Camila Wallace is a 74 y.o. female.  Camila Wallace is here for follow up of ENT complaints. The patient has had problems with nasal congestion, hearing loss and cerumen impaction   Patient does report some intermittent ear pain that has been going on the past several months. She also reports some ongoing neck pain/discomfort   She feels as if nasal symptoms have improved with nasal sprays   She is using these intermittently        Past Medical History:   Diagnosis Date    Allergic rhinitis     Chronic sinusitis     Sommer bullosa     Disease of thyroid gland     Diverticulosis     GERD (gastroesophageal reflux disease)     Graves disease     HLD (hyperlipidemia)     HTN (hypertension)     Obstructive sleep apnea     Type 2 diabetes mellitus     Vasomotor rhinitis        Past Surgical History:   Procedure Laterality Date    CHOLECYSTECTOMY      COLONOSCOPY  10/29/2013    diverticulosis    COLONOSCOPY N/A 7/10/2018    Procedure: COLONOSCOPY WITH ANESTHESIA;  Surgeon: Donald Menchaca DO;  Location: [facility] ENDOSCOPY;  Service: Gastroenterology    COLONOSCOPY N/A 2023    Procedure: COLONOSCOPY WITH ANESTHESIA;  Surgeon: Donald Menchaca DO;  Location: [facility] ENDOSCOPY;  Service: Gastroenterology;  Laterality: N/A;  pre; hx polyps  post: polyp. diverticuloisis.   Sandra Wells PA    FOOT SURGERY      TUBAL ABDOMINAL LIGATION      TYMPANIC MEMBRANE REPAIR         Outpatient Medications Marked as Taking for the 25 encounter (Office Visit) with Sweta Hebert APRN   Medication Sig Dispense Refill    albuterol sulfate  (90 Base) MCG/ACT inhaler Inhale 2 puffs Every 4 (Four) Hours As Needed for Wheezing. 3.1 g 0    Alcohol Swabs (B-D  SINGLE USE SWABS REGULAR) pads       alendronate (FOSAMAX) 70 MG tablet Take 1 tablet by mouth Every 7 (Seven) Days.      allopurinol (ZYLOPRIM) 300 MG tablet Daily.      atorvastatin (LIPITOR) 80 MG tablet Take 1 tablet by mouth Daily.      azelastine (ASTELIN) 0.1 % nasal spray Administer 2 sprays into the nostril(s) as directed by provider 2 (Two) Times a Day.      Blood Glucose Calibration (True Metrix Level 1) Low solution       Blood Glucose Monitoring Suppl (True Metrix Meter) w/Device kit       cloNIDine (CATAPRES) 0.1 MG tablet Take 1 tablet by mouth Every 6 (Six) Hours As Needed for High Blood Pressure (for blood pressure >160/110). 30 tablet 0    diclofenac (VOLTAREN) 75 MG EC tablet Take 1 tablet by mouth Every 12 (Twelve) Hours.      Diclofenac Sodium (VOLTAREN) 1 % gel gel Apply 4 g topically to the appropriate area as directed 4 (Four) Times a Day.      folic acid (FOLVITE) 1 MG tablet Take 1 tablet by mouth Daily.      Glucose Blood (BLOOD GLUCOSE TEST) strip Test blood sugar daily and prn      HYDROcodone-acetaminophen (NORCO)  MG per tablet TAKE 1 TABLET BY MOUTH THREE TIMES A DAY  EFFECTIVE DATE 3 21 19  0    ipratropium (ATROVENT) 0.06 % nasal spray USE 2 SPRAYS IN EACH NOSTRIL TWICE DAILY AS DIRECTED BY PROVIDER 75 mL 3    Lancet Devices (TRUEdraw Lancing Device) misc       levothyroxine (SYNTHROID, LEVOTHROID) 88 MCG tablet Take 1 tablet by mouth Every Morning.      metoprolol succinate XL (TOPROL-XL) 100 MG 24 hr tablet Take 1 tablet by mouth Daily.      naloxone (NARCAN) 4 MG/0.1ML nasal spray Administer 1 spray into the nostril(s) as directed by provider As Needed.      nystatin (MYCOSTATIN) 111942 UNIT/GM ointment Apply 1 Application topically to the appropriate area as directed 2 (Two) Times a Day. 30 g 0    olmesartan-hydrochlorothiazide (BENICAR HCT) 40-12.5 MG per tablet Daily.      pantoprazole (PROTONIX) 40 MG EC tablet Take 1 tablet by mouth Daily.       promethazine-dextromethorphan (PROMETHAZINE-DM) 6.25-15 MG/5ML syrup Take 5 mL by mouth 4 (Four) Times a Day As Needed for Cough. 240 mL 0    topiramate (TOPAMAX) 100 MG tablet Take 1 tablet by mouth 2 (Two) Times a Day.      TRUEplus Lancets 33G misc       vitamin D (ERGOCALCIFEROL) 03013 UNITS capsule capsule Take 1 capsule by mouth 1 (One) Time Per Week.         Metformin and related    Family History   Problem Relation Age of Onset    Diabetes Mother     Heart disease Mother     Hypertension Mother     Hypertension Father     Diabetes Brother     Hypertension Brother     Hyperlipidemia Brother     Colon polyps Brother     Prostate cancer Brother     Colon polyps Daughter     Hyperlipidemia Maternal Uncle     Thyroid disease Maternal Uncle     Cancer Maternal Grandmother        Social History     Socioeconomic History    Marital status: Single   Tobacco Use    Smoking status: Never     Passive exposure: Never    Smokeless tobacco: Never   Vaping Use    Vaping status: Never Used   Substance and Sexual Activity    Alcohol use: Yes     Comment: occasionally    Drug use: No    Sexual activity: Yes       Review of Systems   Constitutional: Negative.    HENT: Negative.         Vitals:    02/05/25 1321   BP: 128/80   Pulse: 82   Temp: 98 °F (36.7 °C)       Body mass index is 32.06 kg/m².    Objective     Physical Exam  Vitals reviewed.   Constitutional:       Appearance: Normal appearance. She is normal weight.   HENT:      Head: Normocephalic.      Right Ear: Tympanic membrane, ear canal and external ear normal.      Left Ear: Tympanic membrane, ear canal and external ear normal.      Ears:      Comments: Bilateral hearing aids        Nose:      Right Turbinates: Pale.      Left Turbinates: Pale.      Mouth/Throat:      Lips: Pink.      Mouth: Mucous membranes are moist.      Pharynx: Uvula midline.   Neurological:      Mental Status: She is alert.         Assessment & Plan   Diagnoses and all orders for this  visit:    1. PND (post-nasal drip) (Primary)    2. Allergic rhinitis due to pollen, unspecified seasonality    3. Decreased hearing of both ears      * Surgery not found *  No orders of the defined types were placed in this encounter.    Return in about 3 months (around 5/5/2025).     Continue nasal sprays daily especially during season changes   Can use oil for cerumen impaction   F/u in 4 - 6 months     Patient Instructions   For the best response, use your nasal sprays every day without skipping doses. It may take several weeks before the full effect is acheived.

## 2025-02-06 ENCOUNTER — TELEPHONE (OUTPATIENT)
Dept: OTOLARYNGOLOGY | Facility: CLINIC | Age: 75
End: 2025-02-06

## 2025-02-06 NOTE — TELEPHONE ENCOUNTER
Caller: MARCOS CLARK    Relationship: SELF    Best call back number: [phone] (home)      What is the best time to reach you: ANY    Who are you requesting to speak with (clinical staff, provider,  specific staff member): NEIL GALVEZ    Do you know the name of the person who called: PATIENT    What was the call regarding: PT HAD AN APPT YESTERDAY WITH NEIL GALVEZ AND SHE STATES  SHE LOST ONE OF HER HEARING AIDS IN THE NUMBER 2 ROOM SHE WAS IN. PLEASE CALL PT BACK IF HER HEARING AID IS FOUND. UNABLE TO WARM TRANSFER TO CHECK. THANK YOU.

## 2025-04-14 NOTE — PROGRESS NOTES
Psychiatric - PODIATRY    Today's Date: 04/23/25    Patient Name: Camila Wallace  MRN: [id number]  CSN: [id number]  PCP: Andrea Yin DO  Referring Provider: No ref. provider found    SUBJECTIVE     Chief Complaint   Patient presents with    Follow-up     Andrea Yin DO  3 months for Diabetic Foot Exam, [facility]  Pt here for nail/foot care. Pt states having some pain in her second toe as it is crossing over her right great toe.     HPI: Camila Wallace, a 75 y.o.female, comes to clinic as a(n) established patient presenting for diabetic foot exam, complaining of toenail/callus issues, and calluses . Patient has h/o farida bullosa, thyroid disease, GERD, Graves Disease, HLD, HTN, FAUSTO, DM2 . Patient is NIDDM and unsure of last BG level.  She reports she does not check her blood glucose regularly. She reports reports occasional numbness/tingling in feet. Denies open wounds or sores. States that her toenails are long, thick and discolored.  Patient reports she has noted more crossover of the right second toe and right hallux.    Reports occasional pain to the right hallux and 2nd toe due to bunion . Has previously had bunion correction to the left foot.     Relates previous treatment(s) including care by podiatry . Denies any constitutional symptoms. No other pedal complaints at this time.    Past Medical History:   Diagnosis Date    Allergic rhinitis     Chronic sinusitis     Farida bullosa     Disease of thyroid gland     Diverticulosis 2013    GERD (gastroesophageal reflux disease)     Graves disease     HLD (hyperlipidemia)     HTN (hypertension)     Obstructive sleep apnea     Type 2 diabetes mellitus     Vasomotor rhinitis      Past Surgical History:   Procedure Laterality Date    CHOLECYSTECTOMY      COLONOSCOPY  10/29/2013    diverticulosis    COLONOSCOPY N/A 7/10/2018    Procedure: COLONOSCOPY WITH ANESTHESIA;  Surgeon: Donald Menchaca DO;  Location: [facility]  ENDOSCOPY;  Service: Gastroenterology    COLONOSCOPY N/A 6/13/2023    Procedure: COLONOSCOPY WITH ANESTHESIA;  Surgeon: Donald Menchaca DO;  Location: [facility] ENDOSCOPY;  Service: Gastroenterology;  Laterality: N/A;  pre; hx polyps  post: polyp. diverticuloisis.   Sandra Wells PA    FOOT SURGERY      TUBAL ABDOMINAL LIGATION      TYMPANIC MEMBRANE REPAIR       Family History   Problem Relation Age of Onset    Diabetes Mother     Heart disease Mother     Hypertension Mother     Hypertension Father     Diabetes Brother     Hypertension Brother     Hyperlipidemia Brother     Colon polyps Brother     Prostate cancer Brother     Colon polyps Daughter     Hyperlipidemia Maternal Uncle     Thyroid disease Maternal Uncle     Cancer Maternal Grandmother      Social History     Socioeconomic History    Marital status: Single   Tobacco Use    Smoking status: Never     Passive exposure: Never    Smokeless tobacco: Never   Vaping Use    Vaping status: Never Used   Substance and Sexual Activity    Alcohol use: Yes     Comment: occasionally    Drug use: No    Sexual activity: Yes     Allergies   Allergen Reactions    Metformin And Related Swelling     Current Outpatient Medications   Medication Sig Dispense Refill    albuterol sulfate  (90 Base) MCG/ACT inhaler Inhale 2 puffs Every 4 (Four) Hours As Needed for Wheezing. 3.1 g 0    Alcohol Swabs (B-D SINGLE USE SWABS REGULAR) pads       alendronate (FOSAMAX) 70 MG tablet Take 1 tablet by mouth Every 7 (Seven) Days.      allopurinol (ZYLOPRIM) 300 MG tablet Daily.      atorvastatin (LIPITOR) 80 MG tablet Take 1 tablet by mouth Daily.      azelastine (ASTELIN) 0.1 % nasal spray Administer 2 sprays into the nostril(s) as directed by provider 2 (Two) Times a Day.      Blood Glucose Calibration (True Metrix Level 1) Low solution       Blood Glucose Monitoring Suppl (True Metrix Meter) w/Device kit       cloNIDine (CATAPRES) 0.1 MG tablet Take 1 tablet by mouth Every  6 (Six) Hours As Needed for High Blood Pressure (for blood pressure >160/110). 30 tablet 0    diclofenac (VOLTAREN) 75 MG EC tablet Take 1 tablet by mouth Every 12 (Twelve) Hours.      Diclofenac Sodium (VOLTAREN) 1 % gel gel Apply 4 g topically to the appropriate area as directed 4 (Four) Times a Day.      folic acid (FOLVITE) 1 MG tablet Take 1 tablet by mouth Daily.      Glucose Blood (BLOOD GLUCOSE TEST) strip Test blood sugar daily and prn      HYDROcodone-acetaminophen (NORCO)  MG per tablet TAKE 1 TABLET BY MOUTH THREE TIMES A DAY  EFFECTIVE DATE 3 21 19  0    ipratropium (ATROVENT) 0.06 % nasal spray USE 2 SPRAYS IN EACH NOSTRIL TWICE DAILY AS DIRECTED BY PROVIDER 75 mL 3    Lancet Devices (TRUEdraw Lancing Device) misc       levothyroxine (SYNTHROID, LEVOTHROID) 88 MCG tablet Take 1 tablet by mouth Every Morning.      metoprolol succinate XL (TOPROL-XL) 100 MG 24 hr tablet Take 1 tablet by mouth Daily.      naloxone (NARCAN) 4 MG/0.1ML nasal spray Administer 1 spray into the nostril(s) as directed by provider As Needed.      nystatin (MYCOSTATIN) 928884 UNIT/GM ointment Apply 1 Application topically to the appropriate area as directed 2 (Two) Times a Day. 30 g 0    olmesartan-hydrochlorothiazide (BENICAR HCT) 40-12.5 MG per tablet Daily.      pantoprazole (PROTONIX) 40 MG EC tablet Take 1 tablet by mouth Daily.      promethazine-dextromethorphan (PROMETHAZINE-DM) 6.25-15 MG/5ML syrup Take 5 mL by mouth 4 (Four) Times a Day As Needed for Cough. 240 mL 0    topiramate (TOPAMAX) 100 MG tablet Take 1 tablet by mouth 2 (Two) Times a Day.      TRUEplus Lancets 33G misc       vitamin D (ERGOCALCIFEROL) 15130 UNITS capsule capsule Take 1 capsule by mouth 1 (One) Time Per Week.      fluticasone (FLONASE) 50 MCG/ACT nasal spray 2 sprays into the nostril(s) as directed by provider Daily for 90 days. 16 g 3     No current facility-administered medications for this visit.     Review of Systems   Constitutional:   Negative for chills and fever.   HENT:  Negative for congestion.    Respiratory:  Negative for shortness of breath.    Cardiovascular:  Positive for leg swelling. Negative for chest pain.   Gastrointestinal:  Negative for constipation, diarrhea, nausea and vomiting.   Musculoskeletal:  Negative for arthralgias and joint swelling.   Skin:  Negative for wound.   Neurological:  Positive for numbness.       OBJECTIVE     Vitals:    04/23/25 1338   BP: 124/70   Pulse: 78   SpO2: 97%           PHYSICAL EXAM  GEN:   Accompanied by none.     Foot/Ankle Exam    GENERAL  Diabetic foot exam performed    Appearance:  appears stated age  Orientation:  AAOx3  Affect:  appropriate  Gait:  unimpaired  Assistance:  independent  Right shoe gear: casual shoe  Left shoe gear: casual shoe    VASCULAR     Right Foot Vascularity   Dorsalis pedis:  2+  Posterior tibial:  2+  Skin temperature:  warm  Edema grading:  None  CFT:  3  Pedal hair growth:  Present  Varicosities:  none     Left Foot Vascularity   Dorsalis pedis:  2+  Posterior tibial:  2+  Skin temperature:  warm  Edema grading:  None  CFT:  3  Pedal hair growth:  Present  Varicosities:  none     NEUROLOGIC     Right Foot Neurologic   Light touch sensation: diminished  Vibratory sensation: diminished  Hot/Cold sensation: diminished  Protective Sensation using Dundee-Narcisa Monofilament:   Sites intact: 7  Sites tested: 10     Left Foot Neurologic   Light touch sensation: diminished  Vibratory sensation: diminished  Hot/Cold sensation:  diminished  Protective Sensation using Dundee-Narcisa Monofilament:   Sites intact: 7  Sites tested: 10    MUSCULOSKELETAL     Right Foot Musculoskeletal   Ecchymosis:  none  Tenderness:  callous right foot and toenail problem    Arch:  Normal  Hammertoe:  Fifth toe and second toe (2nd toe is starting override hallux)  Hallux valgus: Yes (Medial bony eminence with abduction of hallux)    Hallux limitus: No       Left Foot Musculoskeletal    Ecchymosis:  none  Tenderness:  callous left foot and toenail problem  Arch:  Normal  Hammertoe:  Fifth toe  Hallux valgus: No    Hallux limitus: Yes (s/p fusion)      MUSCLE STRENGTH     Right Foot Muscle Strength   Foot dorsiflexion:  5  Foot plantar flexion:  5  Foot inversion:  5  Foot eversion:  5     Left Foot Muscle Strength   Foot dorsiflexion:  5  Foot plantar flexion:  5  Foot inversion:  5  Foot eversion:  5    RANGE OF MOTION     Right Foot Range of Motion   Foot and ankle ROM within normal limits       Left Foot Range of Motion   Foot and ankle ROM within normal limits    1st MTP extension: 0  1st MTP flexion: 0    DERMATOLOGIC      Right Foot Dermatologic   Skin  Positive for corn.   Nails  1.  Positive for elongated, onychomycosis and abnormal thickness.  2.  Positive for elongated, onychomycosis and abnormal thickness.  3.  Positive for elongated, onychomycosis and abnormal thickness.  4.  Positive for elongated, onychomycosis and abnormal thickness.  5.  Positive for elongated, onychomycosis and abnormal thickness.     Left Foot Dermatologic   Skin  Positive for corn.   Nails  1.  Positive for elongated, onychomycosis and abnormal thickness.  2.  Positive for elongated, onychomycosis and abnormal thickness.  3.  Positive for elongated, onychomycosis and abnormal thickness.  4.  Positive for elongated, onychomycosis and abnormally thick.  5.  Positive for elongated, onychomycosis and abnormally thick.    Image:       RADIOLOGY/NUCLEAR:  No results found.    LABORATORY/CULTURE RESULTS:      PATHOLOGY RESULTS:       ASSESSMENT/PLAN     Diagnoses and all orders for this visit:    1. Onychomycosis (Primary)    2. Type 2 diabetes mellitus without complication, without long-term current use of insulin    3. Hammer toes of both feet    4. Chronic kidney disease, stage 3b    5. Hallux valgus, right    6. Foot callus    7. Encounter for diabetic foot exam      Comprehensive lower extremity examination and  evaluation was performed.  Discussed findings and treatment plan including risks, benefits, and treatment options with patient in detail. Patient agreed with treatment plan.  Discussed conservative measures including use of bunion shields, gel toe spacer, and wearing wider shoes, and oral and/or topical NSAIDs for pain relief versus surgical correction.  Patient prefers to remain conservative at this point.  Discussed with patient that if she begins to have significant pain to her right hallux, to contact our office and an appointment can be made to see Dr. Hart about a bunion correction surgery.   After verbal consent obtained, nail(s) x10 debrided of length and thickness with nail nipper without incidence  Patient may maintain nails and calluses at home utilizing emery board or pumice stone between visits as needed  Reviewed at home diabetic foot care including daily foot checks   Continue diabetic monitoring and control under direction of PCP   Remain conservative with foot deformities   Continue use of lotion/Vaseline daily.  Patient is at an increased risk of foot complications due to chronic kidney disease.    Continue to follow up with PCP for treatment of CKD and routine labs.    Bunion shield/ toe spacer x 1 dispensed.  An After Visit Summary was printed and given to the patient at discharge, including (if requested) any available informative/educational handouts regarding diagnosis, treatment, or medications. All questions were answered to patient/family satisfaction. Should symptoms fail to improve or worsen they agree to call or return to clinic or to go to the Emergency Department. Discussed the importance of following up with any needed screening tests/labs/specialist appointments and any requested follow-up recommended by me today. Importance of maintaining follow-up discussed and patient accepts that missed appointments can delay diagnosis and potentially lead to worsening of conditions.  I spent 18  minutes caring for Camila on this date of service. This time includes time spent by me in the following activities: preparing for the visit, performing a medically appropriate examination and/or evaluation, counseling and educating the patient/family/caregiver, and documenting information in the medical record  I spent 5 minutes on the separately reported service of nail debridement. This time is not included in the time used to support the E/M service also reported today.   Return in about 3 months (around 7/23/2025) for Diabetic foot care clinic, With Alee ZIEGLER., or sooner if acute issues arise.    Lab Frequency Next Occurrence       This document has been electronically signed by TONEY Sarmiento on April 23, 2025 15:01 CDT

## 2025-04-23 ENCOUNTER — OFFICE VISIT (OUTPATIENT)
Age: 75
End: 2025-04-23
Payer: MEDICARE

## 2025-04-23 VITALS
OXYGEN SATURATION: 97 % | DIASTOLIC BLOOD PRESSURE: 70 MMHG | HEART RATE: 78 BPM | SYSTOLIC BLOOD PRESSURE: 124 MMHG | HEIGHT: 63 IN | BODY MASS INDEX: 32.07 KG/M2 | WEIGHT: 181 LBS

## 2025-04-23 DIAGNOSIS — E11.9 ENCOUNTER FOR DIABETIC FOOT EXAM: ICD-10-CM

## 2025-04-23 DIAGNOSIS — M20.41 HAMMER TOES OF BOTH FEET: ICD-10-CM

## 2025-04-23 DIAGNOSIS — M20.11 HALLUX VALGUS, RIGHT: ICD-10-CM

## 2025-04-23 DIAGNOSIS — M20.42 HAMMER TOES OF BOTH FEET: ICD-10-CM

## 2025-04-23 DIAGNOSIS — B35.1 ONYCHOMYCOSIS: Primary | ICD-10-CM

## 2025-04-23 DIAGNOSIS — N18.32 CHRONIC KIDNEY DISEASE, STAGE 3B: ICD-10-CM

## 2025-04-23 DIAGNOSIS — L84 FOOT CALLUS: ICD-10-CM

## 2025-04-23 DIAGNOSIS — E11.9 TYPE 2 DIABETES MELLITUS WITHOUT COMPLICATION, WITHOUT LONG-TERM CURRENT USE OF INSULIN: ICD-10-CM

## 2025-05-28 ENCOUNTER — TRANSCRIBE ORDERS (OUTPATIENT)
Dept: ADMINISTRATIVE | Facility: HOSPITAL | Age: 75
End: 2025-05-28
Payer: COMMERCIAL

## 2025-05-28 DIAGNOSIS — Z12.31 ENCOUNTER FOR SCREENING MAMMOGRAM FOR MALIGNANT NEOPLASM OF BREAST: ICD-10-CM

## 2025-05-28 DIAGNOSIS — M81.0 AGE-RELATED OSTEOPOROSIS WITHOUT CURRENT PATHOLOGICAL FRACTURE: Primary | ICD-10-CM

## 2025-05-29 ENCOUNTER — APPOINTMENT (OUTPATIENT)
Dept: GENERAL RADIOLOGY | Facility: HOSPITAL | Age: 75
End: 2025-05-29
Payer: MEDICARE

## 2025-05-29 PROCEDURE — 74018 RADEX ABDOMEN 1 VIEW: CPT

## 2025-06-11 ENCOUNTER — HOSPITAL ENCOUNTER (OUTPATIENT)
Dept: MAMMOGRAPHY | Facility: HOSPITAL | Age: 75
Discharge: HOME | End: 2025-06-11
Payer: MEDICARE

## 2025-06-11 ENCOUNTER — HOSPITAL ENCOUNTER (OUTPATIENT)
Dept: BONE DENSITY | Facility: HOSPITAL | Age: 75
Discharge: HOME | End: 2025-06-11
Payer: MEDICARE

## 2025-06-11 DIAGNOSIS — Z12.31 ENCOUNTER FOR SCREENING MAMMOGRAM FOR MALIGNANT NEOPLASM OF BREAST: ICD-10-CM

## 2025-06-11 DIAGNOSIS — M81.0 AGE-RELATED OSTEOPOROSIS WITHOUT CURRENT PATHOLOGICAL FRACTURE: ICD-10-CM

## 2025-06-11 PROCEDURE — 77063 BREAST TOMOSYNTHESIS BI: CPT

## 2025-06-11 PROCEDURE — 77067 SCR MAMMO BI INCL CAD: CPT

## 2025-06-11 PROCEDURE — 77080 DXA BONE DENSITY AXIAL: CPT

## 2025-07-09 ENCOUNTER — OFFICE VISIT (OUTPATIENT)
Dept: OTOLARYNGOLOGY | Facility: CLINIC | Age: 75
End: 2025-07-09
Payer: MEDICARE

## 2025-07-09 VITALS
BODY MASS INDEX: 32.96 KG/M2 | WEIGHT: 186 LBS | SYSTOLIC BLOOD PRESSURE: 127 MMHG | HEIGHT: 63 IN | DIASTOLIC BLOOD PRESSURE: 74 MMHG

## 2025-07-09 DIAGNOSIS — J30.1 ALLERGIC RHINITIS DUE TO POLLEN, UNSPECIFIED SEASONALITY: Primary | ICD-10-CM

## 2025-07-09 DIAGNOSIS — R09.82 PND (POST-NASAL DRIP): ICD-10-CM

## 2025-07-09 NOTE — PROGRESS NOTES
YOB: 1950  Location: [facility]  Location Address: [address] [place] [address], [facility], [address] [place] [address]  Location Phone: [phone]    Chief Complaint   Patient presents with    Allergic Rhinitis     No problems.       History of Present Illness  Camila Wallace is a 75 y.o. female.  Camila Wallace is here for follow up of ENT complaints. The patient has had problems with congestion seasonal allergies and postnasal drip.  Patient feels as if nasal sprays keep symptoms relatively well-controlled.  She does complain of thick mucus in the back of her throat this is worse when she lays down at night but she does feel as if nasal sprays improve symptoms  Consumes a moderate amount of milk and dairy       Past Medical History:   Diagnosis Date    Allergic rhinitis     Chronic sinusitis     Sommer bullosa     Disease of thyroid gland     Diverticulosis     GERD (gastroesophageal reflux disease)     Graves disease     HLD (hyperlipidemia)     HTN (hypertension)     Obstructive sleep apnea     Type 2 diabetes mellitus     Vasomotor rhinitis        Past Surgical History:   Procedure Laterality Date    CHOLECYSTECTOMY      COLONOSCOPY  10/29/2013    diverticulosis    COLONOSCOPY N/A 7/10/2018    Procedure: COLONOSCOPY WITH ANESTHESIA;  Surgeon: Donald Menchaca DO;  Location: [facility] ENDOSCOPY;  Service: Gastroenterology    COLONOSCOPY N/A 2023    Procedure: COLONOSCOPY WITH ANESTHESIA;  Surgeon: Donald Menchaca DO;  Location: [facility] ENDOSCOPY;  Service: Gastroenterology;  Laterality: N/A;  pre; hx polyps  post: polyp. diverticuloisis.   Sandra Wells PA    FOOT SURGERY      TUBAL ABDOMINAL LIGATION      TYMPANIC MEMBRANE REPAIR         Outpatient Medications Marked as Taking for the 25 encounter (Office Visit) with Sweta Hebert APRN   Medication Sig Dispense Refill    albuterol sulfate  (90 Base) MCG/ACT inhaler Inhale 2 puffs Every 4 (Four) Hours As Needed for  Wheezing. 3.1 g 0    Alcohol Swabs (B-D SINGLE USE SWABS REGULAR) pads       alendronate (FOSAMAX) 70 MG tablet Take 1 tablet by mouth Every 7 (Seven) Days.      allopurinol (ZYLOPRIM) 300 MG tablet Daily.      atorvastatin (LIPITOR) 80 MG tablet Take 1 tablet by mouth Daily.      azelastine (ASTELIN) 0.1 % nasal spray Administer 2 sprays into the nostril(s) as directed by provider 2 (Two) Times a Day.      Blood Glucose Calibration (True Metrix Level 1) Low solution       Blood Glucose Monitoring Suppl (True Metrix Meter) w/Device kit       cloNIDine (CATAPRES) 0.1 MG tablet Take 1 tablet by mouth Every 6 (Six) Hours As Needed for High Blood Pressure (for blood pressure >160/110). 30 tablet 0    diclofenac (VOLTAREN) 75 MG EC tablet Take 1 tablet by mouth Every 12 (Twelve) Hours.      folic acid (FOLVITE) 1 MG tablet Take 1 tablet by mouth Daily.      Glucose Blood (BLOOD GLUCOSE TEST) strip Test blood sugar daily and prn      HYDROcodone-acetaminophen (NORCO)  MG per tablet TAKE 1 TABLET BY MOUTH THREE TIMES A DAY  EFFECTIVE DATE 3 21 19  0    ipratropium (ATROVENT) 0.06 % nasal spray USE 2 SPRAYS IN EACH NOSTRIL TWICE DAILY AS DIRECTED BY PROVIDER 75 mL 3    Lancet Devices (TRUEdraw Lancing Device) misc       levothyroxine (SYNTHROID, LEVOTHROID) 88 MCG tablet Take 1 tablet by mouth Every Morning.      metoprolol succinate XL (TOPROL-XL) 100 MG 24 hr tablet Take 1 tablet by mouth Daily.      naloxone (NARCAN) 4 MG/0.1ML nasal spray Administer 1 spray into the nostril(s) as directed by provider As Needed.      nystatin (MYCOSTATIN) 654903 UNIT/GM ointment Apply 1 Application topically to the appropriate area as directed 2 (Two) Times a Day. 30 g 0    olmesartan-hydrochlorothiazide (BENICAR HCT) 40-12.5 MG per tablet Daily.      pantoprazole (PROTONIX) 40 MG EC tablet Take 1 tablet by mouth Daily.      topiramate (TOPAMAX) 100 MG tablet Take 1 tablet by mouth 2 (Two) Times a Day.      TRUEplus Lancets 33G  misc       vitamin D (ERGOCALCIFEROL) 25951 UNITS capsule capsule Take 1 capsule by mouth 1 (One) Time Per Week.         Metformin and related    Family History   Problem Relation Age of Onset    Diabetes Mother     Heart disease Mother     Hypertension Mother     Hypertension Father     Diabetes Brother     Hypertension Brother     Hyperlipidemia Brother     Colon polyps Brother     Prostate cancer Brother     Colon polyps Daughter     Breast cancer Maternal Grandmother     Cancer Maternal Grandmother     Hyperlipidemia Maternal Uncle     Thyroid disease Maternal Uncle        Social History     Socioeconomic History    Marital status: Single   Tobacco Use    Smoking status: Never     Passive exposure: Never    Smokeless tobacco: Never   Vaping Use    Vaping status: Never Used   Substance and Sexual Activity    Alcohol use: Yes     Comment: occasionally    Drug use: No    Sexual activity: Yes       Review of Systems   Constitutional: Negative.    HENT:  Positive for postnasal drip.        Vitals:    07/09/25 1344   BP: 127/74       Body mass index is 32.95 kg/m².    Objective     Physical Exam  Vitals reviewed.   Constitutional:       Appearance: Normal appearance. She is normal weight.   HENT:      Head: Normocephalic.      Right Ear: Tympanic membrane, ear canal and external ear normal.      Left Ear: Tympanic membrane, ear canal and external ear normal.      Ears:      Comments: Bilateral hearing aids        Nose:      Right Turbinates: Pale.      Left Turbinates: Pale.      Mouth/Throat:      Lips: Pink.      Mouth: Mucous membranes are moist.      Pharynx: Uvula midline.   Neurological:      Mental Status: She is alert.         Assessment & Plan   Diagnoses and all orders for this visit:    1. Allergic rhinitis due to pollen, unspecified seasonality (Primary)    2. PND (post-nasal drip)      * Surgery not found *  No orders of the defined types were placed in this encounter.    No follow-ups on file.      Continue nasal sprays reflux precautions discussed decrease milk dairy consumption  Bed at night  Patient Instructions   For the best response, use your nasal sprays every day without skipping doses. It may take several weeks before the full effect is acheived.

## 2025-07-22 NOTE — PROGRESS NOTES
[facility] - PODIATRY    Today's Date: 07/25/25    Patient Name: Camila Wallace  MRN: [id number]  CSN: [id number]  PCP: Andrea Yin DO  Referring Provider: No ref. provider found    SUBJECTIVE     Chief Complaint   Patient presents with    Follow-up     Andrea Yin DO 04/16/25-3 month diabetic nail/foot care  Pt here for diabetic nail and foot care. Reports no symptoms or concerns at this time      Diabetes     Pt does not check blood glucose regularly      HPI: Camila Wallace, a 75 y.o.female, comes to clinic as a(n) established patient presenting for diabetic foot exam, complaining of toenail/callus issues, and calluses. Patient has h/o farida bullosa, thyroid disease, GERD, Graves Disease, HLD, HTN, FAUSTO, DM2. Patient is NIDDM and unsure of last BG level.  She reports she does not check her blood glucose regularly. She reports reports occasional numbness and tingling to her bilateral feet.  Denies open wounds or sores. States that her toenails are long, thick and discolored.  Patient reports she has noted more crossover of the right second toe and right hallux.   Reports occasional pain to the right hallux and 2nd toe due to bunion. Has previously had bunion correction to the left foot.    Relates previous treatment(s) including care by podiatry. Denies any constitutional symptoms. No other pedal complaints at this time.    Past Medical History:   Diagnosis Date    Allergic rhinitis     Chronic sinusitis     Farida bullosa     Disease of thyroid gland     Diverticulosis 2013    GERD (gastroesophageal reflux disease)     Graves disease     HLD (hyperlipidemia)     HTN (hypertension)     Obstructive sleep apnea     Type 2 diabetes mellitus     Vasomotor rhinitis      Past Surgical History:   Procedure Laterality Date    CHOLECYSTECTOMY      COLONOSCOPY  10/29/2013    diverticulosis    COLONOSCOPY N/A 7/10/2018    Procedure: COLONOSCOPY WITH ANESTHESIA;  Surgeon: Donald COLES  DO Nikolai;  Location: [facility] ENDOSCOPY;  Service: Gastroenterology    COLONOSCOPY N/A 6/13/2023    Procedure: COLONOSCOPY WITH ANESTHESIA;  Surgeon: Donald Menchaca DO;  Location: [facility] ENDOSCOPY;  Service: Gastroenterology;  Laterality: N/A;  pre; hx polyps  post: polyp. diverticuloisis.   Sandra Wells PA    FOOT SURGERY      TUBAL ABDOMINAL LIGATION      TYMPANIC MEMBRANE REPAIR       Family History   Problem Relation Age of Onset    Diabetes Mother     Heart disease Mother     Hypertension Mother     Hypertension Father     Diabetes Brother     Hypertension Brother     Hyperlipidemia Brother     Colon polyps Brother     Prostate cancer Brother     Colon polyps Daughter     Breast cancer Maternal Grandmother     Cancer Maternal Grandmother     Hyperlipidemia Maternal Uncle     Thyroid disease Maternal Uncle      Social History     Socioeconomic History    Marital status: Single   Tobacco Use    Smoking status: Never     Passive exposure: Never    Smokeless tobacco: Never   Vaping Use    Vaping status: Never Used   Substance and Sexual Activity    Alcohol use: Yes     Comment: occasionally    Drug use: No    Sexual activity: Yes     Allergies   Allergen Reactions    Metformin And Related Swelling     Current Outpatient Medications   Medication Sig Dispense Refill    albuterol sulfate  (90 Base) MCG/ACT inhaler Inhale 2 puffs Every 4 (Four) Hours As Needed for Wheezing. 3.1 g 0    Alcohol Swabs (B-D SINGLE USE SWABS REGULAR) pads       alendronate (FOSAMAX) 70 MG tablet Take 1 tablet by mouth Every 7 (Seven) Days.      allopurinol (ZYLOPRIM) 300 MG tablet Daily.      atorvastatin (LIPITOR) 80 MG tablet Take 1 tablet by mouth Daily.      azelastine (ASTELIN) 0.1 % nasal spray Administer 2 sprays into the nostril(s) as directed by provider 2 (Two) Times a Day.      Blood Glucose Calibration (True Metrix Level 1) Low solution       Blood Glucose Monitoring Suppl (True Metrix Meter) w/Device kit        cloNIDine (CATAPRES) 0.1 MG tablet Take 1 tablet by mouth Every 6 (Six) Hours As Needed for High Blood Pressure (for blood pressure >160/110). 30 tablet 0    diclofenac (VOLTAREN) 75 MG EC tablet Take 1 tablet by mouth Every 12 (Twelve) Hours.      folic acid (FOLVITE) 1 MG tablet Take 1 tablet by mouth Daily.      Glucose Blood (BLOOD GLUCOSE TEST) strip Test blood sugar daily and prn      HYDROcodone-acetaminophen (NORCO)  MG per tablet TAKE 1 TABLET BY MOUTH THREE TIMES A DAY  EFFECTIVE DATE 3 21 19  0    ipratropium (ATROVENT) 0.06 % nasal spray USE 2 SPRAYS IN EACH NOSTRIL TWICE DAILY AS DIRECTED BY PROVIDER 75 mL 3    Lancet Devices (TRUEdraw Lancing Device) misc       levothyroxine (SYNTHROID, LEVOTHROID) 88 MCG tablet Take 1 tablet by mouth Every Morning.      metoprolol succinate XL (TOPROL-XL) 100 MG 24 hr tablet Take 1 tablet by mouth Daily.      naloxone (NARCAN) 4 MG/0.1ML nasal spray Administer 1 spray into the nostril(s) as directed by provider As Needed.      nystatin (MYCOSTATIN) 955119 UNIT/GM ointment Apply 1 Application topically to the appropriate area as directed 2 (Two) Times a Day. 30 g 0    olmesartan-hydrochlorothiazide (BENICAR HCT) 40-12.5 MG per tablet Daily.      pantoprazole (PROTONIX) 40 MG EC tablet Take 1 tablet by mouth Daily.      topiramate (TOPAMAX) 100 MG tablet Take 1 tablet by mouth 2 (Two) Times a Day.      TRUEplus Lancets 33G misc       vitamin D (ERGOCALCIFEROL) 06016 UNITS capsule capsule Take 1 capsule by mouth 1 (One) Time Per Week.       No current facility-administered medications for this visit.     Review of Systems   Constitutional:  Negative for chills and fever.   HENT:  Negative for congestion.    Respiratory:  Negative for shortness of breath.    Cardiovascular:  Positive for leg swelling. Negative for chest pain.   Gastrointestinal:  Negative for constipation, diarrhea, nausea and vomiting.   Musculoskeletal:  Negative for arthralgias and joint  swelling.   Skin:  Negative for wound.   Neurological:  Positive for numbness.       OBJECTIVE     Vitals:    07/25/25 1353   BP: 140/86         PHYSICAL EXAM  GEN:   Accompanied by none.     Foot/Ankle Exam    GENERAL  Diabetic foot exam performed    Appearance:  appears stated age  Orientation:  AAOx3  Affect:  appropriate  Gait:  unimpaired  Assistance:  independent  Right shoe gear: casual shoe  Left shoe gear: casual shoe    VASCULAR     Right Foot Vascularity   Dorsalis pedis:  2+  Posterior tibial:  2+  Skin temperature:  warm  Edema grading:  None  CFT:  3  Pedal hair growth:  Present  Varicosities:  none     Left Foot Vascularity   Dorsalis pedis:  2+  Posterior tibial:  2+  Skin temperature:  warm  Edema grading:  None  CFT:  3  Pedal hair growth:  Present  Varicosities:  none     NEUROLOGIC     Right Foot Neurologic   Light touch sensation: diminished  Vibratory sensation: diminished  Hot/Cold sensation: diminished  Protective Sensation using [place] Monofilament:   Sites intact: 7  Sites tested: 10     Left Foot Neurologic   Light touch sensation: diminished  Vibratory sensation: diminished  Hot/Cold sensation:  diminished  Protective Sensation using [place]isa Monofilament:   Sites intact: 7  Sites tested: 10    MUSCULOSKELETAL     Right Foot Musculoskeletal   Ecchymosis:  none  Tenderness:  callous right foot and toenail problem    Arch:  Normal  Hammertoe:  Fifth toe and second toe (2nd toe is starting override hallux)  Hallux valgus: Yes (Medial bony eminence with abduction of hallux)    Hallux limitus: No       Left Foot Musculoskeletal   Ecchymosis:  none  Tenderness:  callous left foot and toenail problem  Arch:  Normal  Hammertoe:  Fifth toe  Hallux valgus: No    Hallux limitus: Yes (s/p fusion)      MUSCLE STRENGTH     Right Foot Muscle Strength   Foot dorsiflexion:  5  Foot plantar flexion:  5  Foot inversion:  5  Foot eversion:  5     Left Foot Muscle Strength   Foot  dorsiflexion:  5  Foot plantar flexion:  5  Foot inversion:  5  Foot eversion:  5    RANGE OF MOTION     Right Foot Range of Motion   Foot and ankle ROM within normal limits       Left Foot Range of Motion   Foot and ankle ROM within normal limits    1st MTP extension: 0  1st MTP flexion: 0    DERMATOLOGIC      Right Foot Dermatologic   Skin  Positive for corn.   Nails  1.  Positive for elongated, onychomycosis and abnormal thickness.  2.  Positive for elongated, onychomycosis and abnormal thickness.  3.  Positive for elongated, onychomycosis and abnormal thickness.  4.  Positive for elongated, onychomycosis and abnormal thickness.  5.  Positive for elongated, onychomycosis and abnormal thickness.     Left Foot Dermatologic   Skin  Positive for corn.   Nails  1.  Positive for elongated, onychomycosis and abnormal thickness.  2.  Positive for elongated, onychomycosis and abnormal thickness.  3.  Positive for elongated, onychomycosis and abnormal thickness.  4.  Positive for elongated, onychomycosis and abnormally thick.  5.  Positive for elongated, onychomycosis and abnormally thick.    Image:       RADIOLOGY/NUCLEAR:  No results found.    LABORATORY/CULTURE RESULTS:      PATHOLOGY RESULTS:       ASSESSMENT/PLAN     Diagnoses and all orders for this visit:    1. Onychomycosis (Primary)    2. Type 2 diabetes mellitus without complication, without long-term current use of insulin    3. Hammer toes of both feet    4. Chronic kidney disease, stage 3b    5. Foot callus    6. Hallux valgus, right        Comprehensive lower extremity examination and evaluation was performed.  Discussed findings and treatment plan including risks, benefits, and treatment options with patient in detail. Patient agreed with treatment plan.  Urgent care note reviewed.  ENT note reviewed.    Discussed conservative measures including use of bunion shields, gel toe spacer, and wearing wider shoes, and oral and/or topical NSAIDs for pain relief  versus surgical correction.  Patient prefers to remain conservative at this point.  Discussed with patient that if she begins to have significant pain to her right hallux, to contact our office and an appointment can be made to see Dr. Hart about a bunion correction surgery.   After verbal consent obtained, nail(s) x10 debrided of length and thickness with nail nipper without incidence  Patient may maintain nails and calluses at home utilizing emery board or pumice stone between visits as needed  Reviewed at home diabetic foot care including daily foot checks   Continue diabetic monitoring and control under direction of PCP   Remain conservative with foot deformities   Continue use of lotion/Vaseline daily.  Patient is at an increased risk of foot complications due to chronic kidney disease.    Continue to follow up with PCP for treatment of CKD and routine labs.    Due to chronic kidney disease and foot callusing, it is recommended that patient return for routine foot nail care.  Patient to call with any questions or concerns before next appointment.  An After Visit Summary was printed and given to the patient at discharge, including (if requested) any available informative/educational handouts regarding diagnosis, treatment, or medications. All questions were answered to patient/family satisfaction. Should symptoms fail to improve or worsen they agree to call or return to clinic or to go to the Emergency Department. Discussed the importance of following up with any needed screening tests/labs/specialist appointments and any requested follow-up recommended by me today. Importance of maintaining follow-up discussed and patient accepts that missed appointments can delay diagnosis and potentially lead to worsening of conditions.  Return in about 3 months (around 10/25/2025) for Diabetic foot care clinic, With Alee ESPINOZA, or sooner if acute issues arise.    Lab Frequency Next Occurrence       This document has  been electronically signed by TONEY Sarmiento on July 25, 2025 17:02 CDT

## 2025-07-24 ENCOUNTER — TELEPHONE (OUTPATIENT)
Age: 75
End: 2025-07-24
Payer: MEDICARE

## 2025-07-24 NOTE — TELEPHONE ENCOUNTER
Attempted to remind patient of appt 07/25/2025 with TONEY Sarmiento.  No answer, unable to leave vm as it is not set up.

## 2025-07-25 ENCOUNTER — OFFICE VISIT (OUTPATIENT)
Age: 75
End: 2025-07-25
Payer: MEDICARE

## 2025-07-25 VITALS
DIASTOLIC BLOOD PRESSURE: 86 MMHG | HEIGHT: 63 IN | WEIGHT: 185.9 LBS | BODY MASS INDEX: 32.94 KG/M2 | SYSTOLIC BLOOD PRESSURE: 140 MMHG

## 2025-07-25 DIAGNOSIS — E11.9 TYPE 2 DIABETES MELLITUS WITHOUT COMPLICATION, WITHOUT LONG-TERM CURRENT USE OF INSULIN: ICD-10-CM

## 2025-07-25 DIAGNOSIS — B35.1 ONYCHOMYCOSIS: Primary | ICD-10-CM

## 2025-07-25 DIAGNOSIS — L84 FOOT CALLUS: ICD-10-CM

## 2025-07-25 DIAGNOSIS — M20.11 HALLUX VALGUS, RIGHT: ICD-10-CM

## 2025-07-25 DIAGNOSIS — M20.41 HAMMER TOES OF BOTH FEET: ICD-10-CM

## 2025-07-25 DIAGNOSIS — M20.42 HAMMER TOES OF BOTH FEET: ICD-10-CM

## 2025-07-25 DIAGNOSIS — N18.32 CHRONIC KIDNEY DISEASE, STAGE 3B: ICD-10-CM

## 2025-08-12 ENCOUNTER — TRANSCRIBE ORDERS (OUTPATIENT)
Dept: ADMINISTRATIVE | Facility: HOSPITAL | Age: 75
End: 2025-08-12
Payer: MEDICARE

## 2025-08-12 DIAGNOSIS — R07.9 CHEST PAIN, UNSPECIFIED TYPE: Primary | ICD-10-CM

## 2025-08-22 DIAGNOSIS — R09.82 PND (POST-NASAL DRIP): ICD-10-CM

## 2025-08-22 DIAGNOSIS — J30.1 ALLERGIC RHINITIS DUE TO POLLEN, UNSPECIFIED SEASONALITY: ICD-10-CM

## 2025-08-22 DIAGNOSIS — J32.9 CHRONIC SINUSITIS, UNSPECIFIED LOCATION: ICD-10-CM

## 2025-08-22 RX ORDER — IPRATROPIUM BROMIDE 42 UG/1
SPRAY, METERED NASAL
Qty: 75 ML | Refills: 3 | Status: SHIPPED | OUTPATIENT
Start: 2025-08-22

## 2025-08-22 RX ORDER — AZELASTINE 1 MG/ML
SPRAY, METERED NASAL
Qty: 120 ML | Refills: 3 | Status: SHIPPED | OUTPATIENT
Start: 2025-08-22

## 2025-08-25 ENCOUNTER — HOSPITAL ENCOUNTER (OUTPATIENT)
Dept: CARDIOLOGY | Facility: HOSPITAL | Age: 75
Discharge: HOME | End: 2025-08-25
Admitting: INTERNAL MEDICINE
Payer: MEDICARE

## 2025-08-25 VITALS
WEIGHT: 185 LBS | HEIGHT: 63 IN | BODY MASS INDEX: 32.78 KG/M2 | DIASTOLIC BLOOD PRESSURE: 69 MMHG | HEART RATE: 67 BPM | SYSTOLIC BLOOD PRESSURE: 132 MMHG

## 2025-08-25 DIAGNOSIS — R07.9 CHEST PAIN, UNSPECIFIED TYPE: ICD-10-CM

## 2025-08-25 PROCEDURE — 93350 STRESS TTE ONLY: CPT

## 2025-08-25 PROCEDURE — 25510000001 PERFLUTREN 6.52 MG/ML SUSPENSION: Performed by: INTERNAL MEDICINE

## 2025-08-25 PROCEDURE — 93017 CV STRESS TEST TRACING ONLY: CPT

## 2025-08-25 RX ADMIN — PERFLUTREN 8.48 MG: 6.52 INJECTION, SUSPENSION INTRAVENOUS at 10:10

## 2025-08-26 LAB
BH CV STRESS BP STAGE 1: NORMAL
BH CV STRESS BP STAGE 2: NORMAL
BH CV STRESS DURATION MIN STAGE 1: 3
BH CV STRESS DURATION MIN STAGE 2: 1
BH CV STRESS DURATION SEC STAGE 1: 0
BH CV STRESS DURATION SEC STAGE 2: 24
BH CV STRESS GRADE STAGE 1: 10
BH CV STRESS GRADE STAGE 2: 12
BH CV STRESS HR STAGE 1: 110
BH CV STRESS HR STAGE 2: 126
BH CV STRESS METS STAGE 1: 5
BH CV STRESS METS STAGE 2: 7.5
BH CV STRESS PROTOCOL 1: NORMAL
BH CV STRESS RECOVERY BP: NORMAL MMHG
BH CV STRESS RECOVERY HR: 77 BPM
BH CV STRESS SPEED STAGE 1: 1.7
BH CV STRESS SPEED STAGE 2: 2.5
BH CV STRESS STAGE 1: 1
BH CV STRESS STAGE 2: 2
MAXIMAL PREDICTED HEART RATE: 145 BPM
PERCENT MAX PREDICTED HR: 86.9 %
STRESS BASELINE BP: NORMAL MMHG
STRESS BASELINE HR: 67 BPM
STRESS PERCENT HR: 102 %
STRESS POST EXERCISE DUR MIN: 4 MIN
STRESS POST EXERCISE DUR SEC: 24 SEC
STRESS POST PEAK BP: NORMAL MMHG
STRESS POST PEAK HR: 126 BPM
STRESS TARGET HR: 123 BPM

## (undated) DEVICE — MASK,OXYGEN,MED CONC,ADLT,7' TUB, UC: Brand: PENDING

## (undated) DEVICE — Device: Brand: DEFENDO AIR/WATER/SUCTION AND BIOPSY VALVE

## (undated) DEVICE — ENDOGATOR AUXILIARY WATER JET CONNECTOR: Brand: ENDOGATOR

## (undated) DEVICE — SENSR O2 OXIMAX FNGR A/ 18IN NONSTR

## (undated) DEVICE — YANKAUER,BULB TIP WITH VENT: Brand: ARGYLE

## (undated) DEVICE — THE SINGLE USE ETRAP – POLYP TRAP IS USED FOR SUCTION RETRIEVAL OF ENDOSCOPICALLY REMOVED POLYPS.: Brand: ETRAP

## (undated) DEVICE — TBG SMPL FLTR LINE NASL 02/C02 A/ BX/100

## (undated) DEVICE — THE CHANNEL CLEANING BRUSH IS A NYLON FLEXI BRUSH ATTACHED TO A FLEXIBLE PLASTIC SHEATH DESIGNED TO SAFELY REMOVE DEBRIS FROM FLEXIBLE ENDOSCOPES.

## (undated) DEVICE — MSK O2 MD CONCENTR A/ LF 7FT 1P/U

## (undated) DEVICE — SNAR POLYP CAPTIVATOR MICROHEX 13 240CM